# Patient Record
Sex: FEMALE | Race: BLACK OR AFRICAN AMERICAN
[De-identification: names, ages, dates, MRNs, and addresses within clinical notes are randomized per-mention and may not be internally consistent; named-entity substitution may affect disease eponyms.]

---

## 2017-03-17 ENCOUNTER — APPOINTMENT (OUTPATIENT)
Dept: INTERNAL MEDICINE | Facility: CLINIC | Age: 42
End: 2017-03-17

## 2017-04-24 ENCOUNTER — HOSPITAL ENCOUNTER (EMERGENCY)
Facility: HOSPITAL | Age: 42
Discharge: HOME | End: 2017-04-24
Attending: EMERGENCY MEDICINE | Admitting: EMERGENCY MEDICINE

## 2017-04-24 ENCOUNTER — APPOINTMENT (EMERGENCY)
Dept: RADIOLOGY | Facility: HOSPITAL | Age: 42
End: 2017-04-24

## 2017-04-24 VITALS
SYSTOLIC BLOOD PRESSURE: 155 MMHG | TEMPERATURE: 98.1 F | OXYGEN SATURATION: 100 % | RESPIRATION RATE: 18 BRPM | BODY MASS INDEX: 32.65 KG/M2 | HEIGHT: 67 IN | DIASTOLIC BLOOD PRESSURE: 84 MMHG | WEIGHT: 208 LBS | HEART RATE: 91 BPM

## 2017-04-24 DIAGNOSIS — M25.539 WRIST PAIN, ACUTE: Primary | ICD-10-CM

## 2017-04-24 PROCEDURE — 99283 EMERGENCY DEPT VISIT LOW MDM: CPT

## 2017-04-24 PROCEDURE — 73130 X-RAY EXAM OF HAND: CPT

## 2017-04-24 PROCEDURE — 73110 X-RAY EXAM OF WRIST: CPT

## 2017-04-24 RX ORDER — IBUPROFEN 600 MG/1
600 TABLET ORAL EVERY 6 HOURS PRN
Qty: 30 TABLET | Refills: 0 | Status: SHIPPED | OUTPATIENT
Start: 2017-04-24 | End: 2018-04-17

## 2017-04-24 RX ORDER — IBUPROFEN 600 MG/1
600 TABLET ORAL ONCE
Status: COMPLETED | OUTPATIENT
Start: 2017-04-24 | End: 2017-04-24

## 2017-04-24 RX ADMIN — IBUPROFEN 600 MG: 600 TABLET ORAL at 17:10

## 2017-10-11 ENCOUNTER — ALLSCRIPTS OFFICE VISIT (OUTPATIENT)
Dept: OTHER | Facility: OTHER | Age: 42
End: 2017-10-11

## 2017-10-11 DIAGNOSIS — Z13.220 ENCOUNTER FOR SCREENING FOR LIPOID DISORDERS: ICD-10-CM

## 2017-10-11 DIAGNOSIS — Z13.1 ENCOUNTER FOR SCREENING FOR DIABETES MELLITUS: ICD-10-CM

## 2017-10-11 DIAGNOSIS — K62.5 HEMORRHAGE OF ANUS AND RECTUM: ICD-10-CM

## 2017-10-12 NOTE — PROGRESS NOTES
Assessment  1  Constipation (564 00) (K59 00)   2  Irritable bowel syndrome with constipation (564 1) (K58 1)   3  Bright red blood per rectum (569 3) (K62 5)   4  Encounter for preventive health examination (V70 0) (Z00 00)   5  Diabetes mellitus screening (V77 1) (Z13 1)   6  Lipid screening (V77 91) (Z13 220)    Plan  Bright red blood per rectum    · (1) CBC/PLT/DIFF; Status:Active; Requested for:11Oct2017;    · (1) OCCULT BLOOD,FECES(SCREEN); Status:Active; Requested for:11Oct2017;   Bright red blood per rectum, Irritable bowel syndrome with constipation    · Hydrocortisone 2 5 % Rectal Cream; APPLY 1 INCH Bedtime  Diabetes mellitus screening    · (1) COMPREHENSIVE METABOLIC PANEL; Status:Active; Requested for:11Oct2017;   Lipid screening    · (1) LIPID PANEL, FASTING; Status:Active; Requested for:11Oct2017;     Discussion/Summary  Discussion Summary:   IBS- constipation predominant- she was counseled extensively in regards to lifestyle modifications, she was advised to dink water and also to increase fiber in her diet with vegetables and also daily fiber with Benefiber  Also she was  din regards to stress reductions and trying yoga  Given Amitiza a samples and explained side effects to he rin detail red blood per rectum- occult blood ordered if positive consider referred to GI specialist Maintenance- CMP, lipid profile orderedup in 1 month  Medication SE Review and Pt Understands Tx: Possible side effects of new medications were reviewed with the patient/guardian today  The treatment plan was reviewed with the patient/guardian  The patient/guardian understands and agrees with the treatment plan      Chief Complaint  Chief Complaint Free Text Note Form: Patient is here today to become established and have routine check up on medical issues      History of Present Illness  HPI: Patient is here to establish care-sued to have a doctor in [place]  has a history of IBS, constipation predominant   She had a colonoscopy about 2 and a half years ago, normal per patient  She says that she was given MiraLAX which she takes as needed  says that had bright red blood per rectum for 2 days  denies any diarrhea  She says that she feels like she needs to go to the bathroom however she does not feel like she finished completely  She says that she has always been constipated all her adult  life  is not a smoker  also gets migraine headaches  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 [employer]  She works also at [employer]  also has a history of Ovarian cysts  Review of Systems  Complete-Female:   Constitutional: No fever, no chills, feels well, no tiredness, no recent weight gain or weight loss  Eyes: No complaints of eye pain, no red eyes, no eyesight problems, no discharge, no dry eyes, no itching of eyes  Cardiovascular: No complaints of slow heart rate, no fast heart rate, no chest pain, no palpitations, no leg claudication, no lower extremity edema  Respiratory: No complaints of shortness of breath, no wheezing, no cough, no SOB on exertion, no orthopnea, no PND  Gastrointestinal: as noted in HPI  Musculoskeletal: No complaints of arthralgias, no myalgias, no joint swelling or stiffness, no limb pain or swelling  Integumentary: No complaints of skin rash or lesions, no itching, no skin wounds, no breast pain or lump  Neurological: No complaints of headache, no confusion, no convulsions, no numbness, no dizziness or fainting, no tingling, no limb weakness, no difficulty walking  Endocrine: No complaints of proptosis, no hot flashes, no muscle weakness, no deepening of the voice, no feelings of weakness  Hematologic/Lymphatic: No complaints of swollen glands, no swollen glands in the neck, does not bleed easily, does not bruise easily  ROS Reviewed:   ROS reviewed  Active Problems  1  Constipation (564 00) (K59 00)   2   Irritable bowel syndrome with constipation (564 1) (K58 1)    Past Medical History  1  History of No acute medical problems  Active Problems And Past Medical History Reviewed: The active problems and past medical history were reviewed and updated today  Surgical History  1  History of Appendectomy   2  History of  Section    Social History   · Never a smoker    Current Meds   1  No Reported Medications Recorded    Allergies  1  No Known Drug Allergies    Vitals  Vital Signs    Recorded: 74WRX0210 08:23AM   Temperature 98 4 F   Heart Rate 78   Systolic 965   Diastolic 70   Height 5 ft 4 5 in   Weight 217 lb 6 08 oz   BMI Calculated 36 74   BSA Calculated 2 04   O2 Saturation 98     Physical Exam    Constitutional   General appearance: No acute distress, well appearing and well nourished  Eyes   Conjunctiva and lids: No swelling, erythema or discharge  Pulmonary   Respiratory effort: No increased work of breathing or signs of respiratory distress  Auscultation of lungs: Clear to auscultation  Cardiovascular   Auscultation of heart: Normal rate and rhythm, normal S1 and S2, without murmurs  Abdomen   Abdomen: Non-tender, no masses  Musculoskeletal   Gait and station: Normal     Skin   Skin and subcutaneous tissue: Normal without rashes or lesions           Signatures   Electronically signed by : Naty Sena MD; Oct 11 2017  9:06AM EST                       (Author)

## 2017-12-08 ENCOUNTER — LAB REQUISITION (OUTPATIENT)
Dept: LAB | Facility: HOSPITAL | Age: 42
End: 2017-12-08
Payer: COMMERCIAL

## 2017-12-08 ENCOUNTER — ALLSCRIPTS OFFICE VISIT (OUTPATIENT)
Dept: OTHER | Facility: OTHER | Age: 42
End: 2017-12-08

## 2017-12-08 DIAGNOSIS — N85.2 HYPERTROPHY OF UTERUS: ICD-10-CM

## 2017-12-08 DIAGNOSIS — Z01.411 ENCOUNTER FOR GYNECOLOGICAL EXAMINATION WITH ABNORMAL FINDING: ICD-10-CM

## 2017-12-08 DIAGNOSIS — Z11.51 ENCOUNTER FOR SCREENING FOR HUMAN PAPILLOMAVIRUS (HPV): ICD-10-CM

## 2017-12-08 DIAGNOSIS — D64.9 ANEMIA: ICD-10-CM

## 2017-12-08 DIAGNOSIS — Z12.31 ENCOUNTER FOR SCREENING MAMMOGRAM FOR MALIGNANT NEOPLASM OF BREAST: ICD-10-CM

## 2017-12-08 PROCEDURE — 87624 HPV HI-RISK TYP POOLED RSLT: CPT | Performed by: OBSTETRICS & GYNECOLOGY

## 2017-12-08 PROCEDURE — G0145 SCR C/V CYTO,THINLAYER,RESCR: HCPCS | Performed by: OBSTETRICS & GYNECOLOGY

## 2017-12-09 NOTE — PROGRESS NOTES
Assessment    1  Enlarged uterus (621 2) (N85 2)   2  Encounter for gynecological examination with abnormal finding (V72 31) (Z01 411)    Plan  Encounter for gynecological examination with abnormal finding    · Call [phone] if: You find a new or different kind of lump in your breast ;Status:Complete;   Done: [id number]   Ordered;for gynecological examination with abnormal finding; Ordered By:Edson Sequeira Serum;   · Decreasing the stress in your life may help your condition improve ; Status:Complete;  Done: 82YDQ2188   Ordered;For:Encounter for gynecological examination with abnormal finding; Ordered By:Edson Sequeira Serum;   · Eat a normal well-balanced diet ; Status:Complete;   Done: 65XLD2937   Ordered;for gynecological examination with abnormal finding; Ordered By:Edson Sequeira Serum;   · Vitamins can help you get daily requirements that your diet may not be giving you  ;Status:Complete;   Done: 58GOP9787   Ordered;for gynecological examination with abnormal finding; Ordered By:Edson Sequeira Serum;   · We recommend that you follow these steps to lower your risk of osteoporosis  ;Status:Complete;   Done: 32VWV5035   Ordered;for gynecological examination with abnormal finding; Ordered By:Edson Sequeira Serum;   · Follow-up visit in 1 year Evaluation and Treatment  Follow-up  Status: Complete  Done:68Uws4667   Ordered; For: Encounter for gynecological examination with abnormal finding; Ordered By: Dez Katz Performed:  Due: 01ZQC1432; Last Updated By: Samantha Sifuentes; 12/8/2017 3:14:43 PM  Encounter for screening mammogram for malignant neoplasm of breast    · * MAMMO SCREENING BILATERAL W CAD; Status:Active; Requested for:87Smf4730;    Perform:[facility]; [phone];  Last Updated Clearance Phlegm; 12/8/2017 3:09:23 PM;Ordered;for screening mammogram for malignant neoplasm of breast; Ordered By:Edson Sequeira Serum;  Enlarged uterus    · * US PELVIS COMPLETE (TRANSABDOMINAL AND TRANSVAGINAL); Status:Active; Requested for:73Lhj3241;    Perform:[facility]; [phone]; Last Updated By:Maribel Meek; 12/8/2017 3:14:28 PM;Ordered;uterus; Billy Merrill;    Discussion/Summary  healthy adult female HPV and Pap Co-testing Done Today Breast cancer screening: mammogram has been ordered  1  Urinary urgency, vulvar itching and odor may be due to constant pad use - discussed use of petrolatum jelly or emollient on skin for protection  For now try OTC hydrocortisone cream daily x 1 week follow ing menses  Enlarged uterus- will order ultrasound to fully evaluate and compare to prior records  The patient has the current Goals: Healthy lifestyle  The patent has the current Barriers: None  Patient is able to Self-Care  PATIENT EDUCATION RECORD She was given the following educational materials:  age appropriate care guide  Possible side effects of new medications were reviewed with the patient/guardian today  The treatment plan was reviewed with the patient/guardian  The patient/guardian understands and agrees with the treatment plan     Self Referrals: No      Chief Complaint  Pt is here for yearly exam       History of Present Illness  HPI: 1  groin/vulvar itchingurinary urgencyvaginal odorFamily history of breast cancer (Aunt), MGM -abdominal unknown primary    GYN HM, Adult Female [facility] The patient is being seen for a gynecology evaluation  The last health maintenance visit was 1 year(s) ago  Social History: Household members include spouse,-- 3 daughter(s)-- and-- 17, 13, 15  She is   Work status: working full time-- and-- occupation: [employer]  General Health: The patient's health since the last visit is described as good  Lifestyle:  She does not use tobacco -- She consumes alcohol  She reports occasional alcohol use  -- She denies drug use  Reproductive health:  she reports no menstrual problems    Menstrual history: LMP: the last menstrual period was 12/8/2017  Recent menstrual periods: bleeding has been normal  The cycles have been regular  -- she uses contraception  For contraception, she has had a tubal occlusion  -- she is sexually active  -- pregnancy history: G 4P 1,-- 3  Screening: Cervical cancer screening includes a pap smear performed 10/15/2014, negative-- and-- uncertain timing of her last human papilloma virus screening  Review of Systems  vulvar itching,-- feelings of urinary urgency-- and-- urinary loss of control, but-- no pelvic pain,-- no pelvic pressure,-- no vaginal pain,-- no vaginal discharge,-- no vaginal itching,-- no vaginal lump or mass,-- no nonmenstrual bleeding,-- no dysmenorrhea-- and-- no menorrhagia--   The patient presents with complaints of vaginal odor (occurs pre and post menses no vaginal itching, no dysparuenia)  Additional findings include wears pads daily, rare leaking  Groin fold and labia majora itching  Constitutional: No fever, no chills, feels well, no tiredness, no recent weight gain or loss  ENT: no ear ache, no loss of hearing, no nosebleeds or nasal discharge, no sore throat or hoarseness  Cardiovascular: no complaints of slow or fast heart rate, no chest pain, no palpitations, no leg claudication or lower extremity edema  Respiratory: no complaints of shortness of breath, no wheezing, no dyspnea on exertion, no orthopnea or PND  Breasts: no complaints of breast pain, breast lump or nipple discharge  Gastrointestinal: no complaints of abdominal pain, no constipation, no nausea or diarrhea, no vomiting, no bloody stools  Genitourinary: no complaints of dysuria, no incontinence, no pelvic pain, no dysmenorrhea, no vaginal discharge or abnormal vaginal bleeding  Musculoskeletal: no complaints of arthralgia, no myalgia, no joint swelling or stiffness, no limb pain or swelling  Integumentary: no complaints of skin rash or lesion, no itching or dry skin, no skin wounds    Neurological: no complaints of headache, no confusion, no numbness or tingling, no dizziness or fainting  Active Problems    1  Bright red blood per rectum (569 3) (K62 5)   2  Constipation (564 00) (K59 00)   3  Diabetes mellitus screening (V77 1) (Z13 1)   4  Encounter for screening mammogram for malignant neoplasm of breast (V76 12) (Z12 31)   5  Irritable bowel syndrome with constipation (564 1) (K58 1)   6  Lipid screening (V77 91) (Z13 220)   7  Need for influenza vaccination (V04 81) (Z23)    Past Medical History   · History of No acute medical problems    Surgical History   · History of Appendectomy   · History of  Section    Social History     · Never a smoker    Current Meds   1  Amitiza 8 MCG Oral Capsule; take 1 capsule daily; Therapy: [id number] to (Last Rx:2017) Ordered   2  Hydrocortisone 2 5 % Rectal Cream; APPLY 1 INCH Bedtime; Therapy: 39XUV7150 to (Last Rx:2017)  Requested for: 2017 Ordered    Allergies  1  No Known Drug Allergies    Vitals   Recorded: 32KNS8939 91:11VA   Systolic 581, LUE, Sitting   Diastolic 78, LUE, Sitting   Height 5 ft 4 5 in   Weight 214 lb    BMI Calculated 36 17   BSA Calculated 2 02   LMP [id number]       Physical Exam   Constitutional  General appearance: No acute distress, well appearing and well nourished  Genitourinary  External genitalia: Normal and no lesions appreciated  Vagina: Normal, no lesions or dryness appreciated  Urethra: Normal    Urethral meatus: Normal    Bladder: Normal, soft, non-tender and no prolapse or masses appreciated  Cervix: Normal, no palpable masses  Uterus: Abnormal   The uterus was anteverted,-- enlarged 14 weeks-- and-- found to have a 5 cm palpable mass  Adnexa/parametria: Normal, non-tender and no fullness or masses appreciated         Future Appointments    Date/Time Provider Specialty Site   12/10/2018 10:30 AM Elizabeth Rose MD Obstetrics/Gynecology [facility]       Signatures Electronically signed by : Ulices Dasilva MD; Dec  8 2017  3:19PM EST                       (Author)

## 2017-12-13 LAB — HPV RRNA GENITAL QL NAA+PROBE: NORMAL

## 2017-12-15 LAB
LAB AP GYN PRIMARY INTERPRETATION: NORMAL
LAB AP LMP: NORMAL
Lab: NORMAL
PATH INTERP SPEC-IMP: NORMAL

## 2017-12-19 ENCOUNTER — HOSPITAL ENCOUNTER (OUTPATIENT)
Dept: ULTRASOUND IMAGING | Facility: HOSPITAL | Age: 42
Discharge: HOME | End: 2017-12-22
Attending: OBSTETRICS & GYNECOLOGY
Payer: COMMERCIAL

## 2017-12-19 DIAGNOSIS — N85.2 HYPERTROPHY OF UTERUS: ICD-10-CM

## 2017-12-19 PROCEDURE — 76830 TRANSVAGINAL US NON-OB: CPT

## 2017-12-19 PROCEDURE — 76856 US EXAM PELVIC COMPLETE: CPT

## 2017-12-20 ENCOUNTER — APPOINTMENT (OUTPATIENT)
Dept: LAB | Facility: HOSPITAL | Age: 42
End: 2017-12-20
Attending: FAMILY MEDICINE
Payer: COMMERCIAL

## 2017-12-20 DIAGNOSIS — Z13.220 ENCOUNTER FOR SCREENING FOR LIPOID DISORDERS: ICD-10-CM

## 2017-12-20 DIAGNOSIS — Z13.1 ENCOUNTER FOR SCREENING FOR DIABETES MELLITUS: ICD-10-CM

## 2017-12-20 DIAGNOSIS — K62.5 HEMORRHAGE OF ANUS AND RECTUM: ICD-10-CM

## 2017-12-20 LAB
ALBUMIN SERPL BCP-MCNC: 3.4 G/DL (ref 3.5–5)
ALP SERPL-CCNC: 75 U/L (ref 46–116)
ALT SERPL W P-5'-P-CCNC: 15 U/L (ref 12–78)
ANION GAP SERPL CALCULATED.3IONS-SCNC: 7 MMOL/L (ref 4–13)
AST SERPL W P-5'-P-CCNC: 9 U/L (ref 5–45)
BASOPHILS # BLD AUTO: 0.06 THOUSANDS/ΜL (ref 0–0.1)
BASOPHILS NFR BLD AUTO: 1 % (ref 0–1)
BILIRUB SERPL-MCNC: 0.3 MG/DL (ref 0.2–1)
BUN SERPL-MCNC: 12 MG/DL (ref 5–25)
CALCIUM SERPL-MCNC: 9 MG/DL (ref 8.3–10.1)
CHLORIDE SERPL-SCNC: 107 MMOL/L (ref 100–108)
CHOLEST SERPL-MCNC: 152 MG/DL (ref 50–200)
CO2 SERPL-SCNC: 28 MMOL/L (ref 21–32)
CREAT SERPL-MCNC: 0.84 MG/DL (ref 0.6–1.3)
EOSINOPHIL # BLD AUTO: 0.16 THOUSAND/ΜL (ref 0–0.61)
EOSINOPHIL NFR BLD AUTO: 2 % (ref 0–6)
ERYTHROCYTE [DISTWIDTH] IN BLOOD BY AUTOMATED COUNT: 15.2 % (ref 11.6–15.1)
GFR SERPL CREATININE-BSD FRML MDRD: 100 ML/MIN/1.73SQ M
GLUCOSE P FAST SERPL-MCNC: 116 MG/DL (ref 65–99)
HCT VFR BLD AUTO: 34.2 % (ref 34.8–46.1)
HDLC SERPL-MCNC: 60 MG/DL (ref 40–60)
HGB BLD-MCNC: 10.6 G/DL (ref 11.5–15.4)
LDLC SERPL CALC-MCNC: 87 MG/DL (ref 0–100)
LYMPHOCYTES # BLD AUTO: 2.33 THOUSANDS/ΜL (ref 0.6–4.47)
LYMPHOCYTES NFR BLD AUTO: 24 % (ref 14–44)
MCH RBC QN AUTO: 24.3 PG (ref 26.8–34.3)
MCHC RBC AUTO-ENTMCNC: 31 G/DL (ref 31.4–37.4)
MCV RBC AUTO: 78 FL (ref 82–98)
MONOCYTES # BLD AUTO: 0.7 THOUSAND/ΜL (ref 0.17–1.22)
MONOCYTES NFR BLD AUTO: 7 % (ref 4–12)
NEUTROPHILS # BLD AUTO: 6.32 THOUSANDS/ΜL (ref 1.85–7.62)
NEUTS SEG NFR BLD AUTO: 66 % (ref 43–75)
NRBC BLD AUTO-RTO: 0 /100 WBCS
PLATELET # BLD AUTO: 285 THOUSANDS/UL (ref 149–390)
PMV BLD AUTO: 10.7 FL (ref 8.9–12.7)
POTASSIUM SERPL-SCNC: 4.2 MMOL/L (ref 3.5–5.3)
PROT SERPL-MCNC: 7.5 G/DL (ref 6.4–8.2)
RBC # BLD AUTO: 4.36 MILLION/UL (ref 3.81–5.12)
SODIUM SERPL-SCNC: 142 MMOL/L (ref 136–145)
TRIGL SERPL-MCNC: 27 MG/DL
WBC # BLD AUTO: 9.6 THOUSAND/UL (ref 4.31–10.16)

## 2017-12-20 PROCEDURE — 80053 COMPREHEN METABOLIC PANEL: CPT

## 2017-12-20 PROCEDURE — 80061 LIPID PANEL: CPT

## 2017-12-20 PROCEDURE — 36415 COLL VENOUS BLD VENIPUNCTURE: CPT

## 2017-12-20 PROCEDURE — 85025 COMPLETE CBC W/AUTO DIFF WBC: CPT

## 2017-12-22 ENCOUNTER — GENERIC CONVERSION - ENCOUNTER (OUTPATIENT)
Dept: OTHER | Facility: OTHER | Age: 42
End: 2017-12-22

## 2018-01-12 VITALS
WEIGHT: 217.38 LBS | BODY MASS INDEX: 36.22 KG/M2 | SYSTOLIC BLOOD PRESSURE: 110 MMHG | TEMPERATURE: 98.4 F | HEART RATE: 78 BPM | DIASTOLIC BLOOD PRESSURE: 70 MMHG | OXYGEN SATURATION: 98 % | HEIGHT: 65 IN

## 2018-01-22 VITALS
WEIGHT: 214 LBS | SYSTOLIC BLOOD PRESSURE: 130 MMHG | BODY MASS INDEX: 35.65 KG/M2 | HEIGHT: 65 IN | DIASTOLIC BLOOD PRESSURE: 78 MMHG

## 2018-01-23 NOTE — MISCELLANEOUS
Message   Recorded as Task   Date: 12/22/2017 10:39 AM, Created By: Mary Grace   Task Name: Go to Result   Assigned To: Brendan Ibanez   Regarding Patient: Louis Elliott, Status: In Progress   Arturobernardinohal Hess - 22 Dec 2017 10:39 AM     TASK CREATED  please call Shmuel Taylorgeovanna - her ultrasound is normal     Her uterus is normal size  there is a small ovarian cyst which is resolving  Stefani Gil - 22 Dec 2017 10:43 AM     TASK IN PROGRESS   Stefani Gil - 22 Dec 2017 10:46 AM     TASK EDITED  per aaron mckeon for pt re u/s results        Active Problems    1  Anemia (285 9) (D64 9)   2  Bright red blood per rectum (569 3) (K62 5)   3  Constipation (564 00) (K59 00)   4  Diabetes mellitus screening (V77 1) (Z13 1)   5  Encounter for gynecological examination with abnormal finding (V72 31) (Z01 411)   6  Encounter for screening mammogram for malignant neoplasm of breast (V76 12)   (Z12 31)   7  Enlarged uterus (621 2) (N85 2)   8  Irritable bowel syndrome with constipation (564 1) (K58 1)   9  Lipid screening (V77 91) (Z13 220)   10  Need for influenza vaccination (V04 81) (Z23)   11  Screening for HPV (human papillomavirus) (V73 81) (Z11 51)    Current Meds   1  Amitiza 8 MCG Oral Capsule; take 1 capsule daily; Therapy: 28GVU9470 to (Last Rx:11Oct2017) Ordered   2  Hydrocortisone 2 5 % Rectal Cream; APPLY 1 INCH Bedtime; Therapy: [id number] to (Last Rx:11Oct2017)  Requested for: 11Oct2017 Ordered    Allergies    1   No Known Drug Allergies    Signatures   Electronically signed by : Velia Butler, ; Dec 22 2017 10:46AM EST                       (Author)

## 2018-04-11 ENCOUNTER — TELEPHONE (OUTPATIENT)
Dept: FAMILY MEDICINE CLINIC | Facility: CLINIC | Age: 43
End: 2018-04-11

## 2018-04-11 NOTE — TELEPHONE ENCOUNTER
Patient has a drivers permit physical to be filled out   States you did a physical in October and wonders if she needs another physical or can you sign

## 2018-04-17 ENCOUNTER — OFFICE VISIT (OUTPATIENT)
Dept: FAMILY MEDICINE CLINIC | Facility: CLINIC | Age: 43
End: 2018-04-17
Payer: COMMERCIAL

## 2018-04-17 VITALS
HEART RATE: 86 BPM | BODY MASS INDEX: 34.53 KG/M2 | DIASTOLIC BLOOD PRESSURE: 82 MMHG | SYSTOLIC BLOOD PRESSURE: 142 MMHG | HEIGHT: 67 IN | TEMPERATURE: 48.2 F | RESPIRATION RATE: 18 BRPM | WEIGHT: 220 LBS | OXYGEN SATURATION: 98 %

## 2018-04-17 DIAGNOSIS — R73.03 PREDIABETES: ICD-10-CM

## 2018-04-17 DIAGNOSIS — Z12.31 SCREENING MAMMOGRAM, ENCOUNTER FOR: ICD-10-CM

## 2018-04-17 DIAGNOSIS — Z00.00 WELLNESS EXAMINATION: Primary | ICD-10-CM

## 2018-04-17 DIAGNOSIS — R53.83 FATIGUE, UNSPECIFIED TYPE: ICD-10-CM

## 2018-04-17 DIAGNOSIS — Z13.220 LIPID SCREENING: ICD-10-CM

## 2018-04-17 PROCEDURE — 99396 PREV VISIT EST AGE 40-64: CPT | Performed by: NURSE PRACTITIONER

## 2018-04-17 NOTE — PATIENT INSTRUCTIONS
Wellness- cleared for driving  Check labs  Our office will call you with results  Check routine mammogram,     Wellness Visit for Adults   AMBULATORY CARE:   A wellness visit  is when you see your healthcare provider to get screened for health problems  You can also get advice on how to stay healthy  Write down your questions so you remember to ask them  Ask your healthcare provider how often you should have a wellness visit  What happens at a wellness visit:  Your healthcare provider will ask about your health, and your family history of health problems  This includes high blood pressure, heart disease, and cancer  He or she will ask if you have symptoms that concern you, if you smoke, and about your mood  You may also be asked about your intake of medicines, supplements, food, and alcohol  Any of the following may be done:  · Your weight  will be checked  Your height may also be checked so your body mass index (BMI) can be calculated  Your BMI shows if you are at a healthy weight  · Your blood pressure  and heart rate will be checked  Your temperature may also be checked  · Blood and urine tests  may be done  Blood tests may be done to check your cholesterol levels  Abnormal cholesterol levels increase your risk for heart disease and stroke  You may also need a blood or urine test to check for diabetes if you are at increased risk  Urine tests may be done to look for signs of an infection or kidney disease  · A physical exam  includes checking your heartbeat and lungs with a stethoscope  Your healthcare provider may also check your skin to look for sun damage  · Screening tests  may be recommended  A screening test is done to check for diseases that may not cause symptoms  The screening tests you may need depend on your age, gender, family history, and lifestyle habits  For example, colorectal screening may be recommended if you are 48years old or older    Screening tests you need if you are a woman:   · A Pap smear  is used to screen for cervical cancer  Pap smears are usually done every 3 to 5 years depending on your age  You may need them more often if you have had abnormal Pap smear test results in the past  Ask your healthcare provider how often you should have a Pap smear  · A mammogram  is an x-ray of your breasts to screen for breast cancer  Experts recommend mammograms every 2 years starting at age 48 years  You may need a mammogram at age 52 years or younger if you have an increased risk for breast cancer  Talk to your healthcare provider about when you should start having mammograms and how often you need them  Vaccines you may need:   · Get an influenza vaccine  every year  The influenza vaccine protects you from the flu  Several types of viruses cause the flu  The viruses change over time, so new vaccines are made each year  · Get a tetanus-diphtheria (Td) booster vaccine  every 10 years  This vaccine protects you against tetanus and diphtheria  Tetanus is a severe infection that may cause painful muscle spasms and lockjaw  Diphtheria is a severe bacterial infection that causes a thick covering in the back of your mouth and throat  · Get a human papillomavirus (HPV) vaccine  if you are female and aged 23 to 32 or male 23 to 24 and never received it  This vaccine protects you from HPV infection  HPV is the most common infection spread by sexual contact  HPV may also cause vaginal, penile, and anal cancers  · Get a pneumococcal vaccine  if you are aged 72 years or older  The pneumococcal vaccine is an injection given to protect you from pneumococcal disease  Pneumococcal disease is an infection caused by pneumococcal bacteria  The infection may cause pneumonia, meningitis, or an ear infection  · Get a shingles vaccine  if you are aged 61 or older, even if you have had shingles before  The shingles vaccine is an injection to protect you from the varicella-zoster virus   This is the same virus that causes chickenpox  Shingles is a painful rash that develops in people who had chickenpox or have been exposed to the virus  How to eat healthy:  My Plate is a model for planning healthy meals  It shows the types and amounts of foods that should go on your plate  Fruits and vegetables make up about half of your plate, and grains and protein make up the other half  A serving of dairy is included on the side of your plate  The amount of calories and serving sizes you need depends on your age, gender, weight, and height  Examples of healthy foods are listed below:  · Eat a variety of vegetables  such as dark green, red, and orange vegetables  You can also include canned vegetables low in sodium (salt) and frozen vegetables without added butter or sauces  · Eat a variety of fresh fruits , canned fruit in 100% juice, frozen fruit, and dried fruit  · Include whole grains  At least half of the grains you eat should be whole grains  Examples include whole-wheat bread, wheat pasta, brown rice, and whole-grain cereals such as oatmeal     · Eat a variety of protein foods such as seafood (fish and shellfish), lean meat, and poultry without skin (turkey and chicken)  Examples of lean meats include pork leg, shoulder, or tenderloin, and beef round, sirloin, tenderloin, and extra lean ground beef  Other protein foods include eggs and egg substitutes, beans, peas, soy products, nuts, and seeds  · Choose low-fat dairy products such as skim or 1% milk or low-fat yogurt, cheese, and cottage cheese  · Limit unhealthy fats  such as butter, hard margarine, and shortening  Exercise:  Exercise at least 30 minutes per day on most days of the week  Some examples of exercise include walking, biking, dancing, and swimming  You can also fit in more physical activity by taking the stairs instead of the elevator or parking farther away from stores  Include muscle strengthening activities 2 days each week  Regular exercise provides many health benefits  It helps you manage your weight, and decreases your risk for type 2 diabetes, heart disease, stroke, and high blood pressure  Exercise can also help improve your mood  Ask your healthcare provider about the best exercise plan for you  General health and safety guidelines:   · Do not smoke  Nicotine and other chemicals in cigarettes and cigars can cause lung damage  Ask your healthcare provider for information if you currently smoke and need help to quit  E-cigarettes or smokeless tobacco still contain nicotine  Talk to your healthcare provider before you use these products  · Limit alcohol  A drink of alcohol is 12 ounces of beer, 5 ounces of wine, or 1½ ounces of liquor  · Lose weight, if needed  Being overweight increases your risk of certain health conditions  These include heart disease, high blood pressure, type 2 diabetes, and certain types of cancer  · Protect your skin  Do not sunbathe or use tanning beds  Use sunscreen with a SPF 15 or higher  Apply sunscreen at least 15 minutes before you go outside  Reapply sunscreen every 2 hours  Wear protective clothing, hats, and sunglasses when you are outside  · Drive safely  Always wear your seatbelt  Make sure everyone in your car wears a seatbelt  A seatbelt can save your life if you are in an accident  Do not use your cell phone when you are driving  This could distract you and cause an accident  Pull over if you need to make a call or send a text message  · Practice safe sex  Use latex condoms if are sexually active and have more than one partner  Your healthcare provider may recommend screening tests for sexually transmitted infections (STIs)  · Wear helmets, lifejackets, and protective gear  Always wear a helmet when you ride a bike or motorcycle, go skiing, or play sports that could cause a head injury  Wear protective equipment when you play sports   Wear a lifejacket when you are on a boat or doing water sports  © 2017 [facility] Information is for End User's use only and may not be sold, redistributed or otherwise used for commercial purposes  All illustrations and images included in CareNotes® are the copyrighted property of A D A M , Inc  or [employer]  The above information is an  only  It is not intended as medical advice for individual conditions or treatments  Talk to your doctor, nurse or pharmacist before following any medical regimen to see if it is safe and effective for you

## 2018-04-17 NOTE — PROGRESS NOTES
Assessment/Plan:    No problem-specific Assessment & Plan notes found for this encounter  Diagnoses and all orders for this visit:    Wellness examination  -     Comprehensive metabolic panel; Future  -     Lipid panel; Future  -     TSH, 3rd generation; Future  -     Vitamin D 25 hydroxy; Future    Lipid screening  -     Comprehensive metabolic panel; Future  -     Lipid panel; Future  -     TSH, 3rd generation; Future  -     Vitamin D 25 hydroxy; Future    Fatigue, unspecified type  -     Comprehensive metabolic panel; Future  -     Lipid panel; Future  -     TSH, 3rd generation; Future  -     Vitamin D 25 hydroxy; Future    Screening mammogram, encounter for  -     Comprehensive metabolic panel; Future  -     Lipid panel; Future  -     TSH, 3rd generation; Future  -     Vitamin D 25 hydroxy; Future  -     Mammo screening bilateral w cad; Future    Prediabetes  -     Comprehensive metabolic panel; Future  -     Lipid panel; Future  -     TSH, 3rd generation; Future  -     Vitamin D 25 hydroxy; Future  -     HEMOGLOBIN A1C W/ EAG ESTIMATION; Future    Other orders  -     hydrocortisone 2 5 % cream; Insert into the rectum          Subjective:      Patient ID: Tyler Araujo is a 40 y o  female  Pt is here for Annual Physical   She needs Mammogram  She feels well  No issues or concerns  She works at [employer]  The following portions of the patient's history were reviewed and updated as appropriate:   She  has no past medical history on file  She   Patient Active Problem List    Diagnosis Date Noted    Wellness examination 2018    Lipid screening 2018    Fatigue 2018    Screening mammogram, encounter for 2018    Prediabetes 2018     She  has a past surgical history that includes  section and Appendectomy  Her family history is not on file  She  reports that she has never smoked  She has never used smokeless tobacco  She reports that she drinks alcohol   She reports that she does not use drugs  Current Outpatient Prescriptions   Medication Sig Dispense Refill    hydrocortisone 2 5 % cream Insert into the rectum       No current facility-administered medications for this visit  Current Outpatient Prescriptions on File Prior to Visit   Medication Sig    [DISCONTINUED] ibuprofen (MOTRIN) 600 mg tablet Take 1 tablet by mouth every 6 (six) hours as needed for mild pain for up to 10 days     No current facility-administered medications on file prior to visit  She has No Known Allergies       Review of Systems   Constitutional: Negative  HENT: Negative  Eyes: Negative  Respiratory: Negative for cough  Cardiovascular: Negative  Gastrointestinal: Negative  Musculoskeletal: Negative  Skin: Negative  Neurological: Negative  Psychiatric/Behavioral: Negative  All other systems reviewed and are negative  Objective:      /82 (BP Location: Left arm, Patient Position: Sitting)   Pulse 86   Temp (!) 48 2 °F (9 °C)   Resp 18   Ht 5' 7" (1 702 m)   Wt 99 8 kg (220 lb)   SpO2 98%   BMI 34 46 kg/m²          Physical Exam   Constitutional: She is oriented to person, place, and time  She appears well-developed and well-nourished  No distress  HENT:   Head: Normocephalic and atraumatic  Right Ear: External ear normal    Left Ear: External ear normal    Nose: Nose normal    Mouth/Throat: Oropharynx is clear and moist    Eyes: Conjunctivae and EOM are normal  Pupils are equal, round, and reactive to light  Neck: Normal range of motion  Neck supple  No thyromegaly present  Cardiovascular: Normal rate, regular rhythm and normal heart sounds  No murmur heard  Pulmonary/Chest: Effort normal and breath sounds normal  No respiratory distress  She has no wheezes  Abdominal: Soft  Bowel sounds are normal  She exhibits no distension  There is no tenderness  Musculoskeletal: Normal range of motion   She exhibits no edema, tenderness or deformity  Lymphadenopathy:     She has no cervical adenopathy  Neurological: She is alert and oriented to person, place, and time  Skin: Skin is warm and dry  No rash noted  No pallor  Psychiatric: She has a normal mood and affect  Her behavior is normal  Judgment and thought content normal    Nursing note and vitals reviewed

## 2018-09-21 ENCOUNTER — OFFICE VISIT (OUTPATIENT)
Dept: FAMILY MEDICINE CLINIC | Facility: CLINIC | Age: 43
End: 2018-09-21
Payer: COMMERCIAL

## 2018-09-21 VITALS
HEIGHT: 67 IN | BODY MASS INDEX: 32.65 KG/M2 | SYSTOLIC BLOOD PRESSURE: 118 MMHG | RESPIRATION RATE: 18 BRPM | DIASTOLIC BLOOD PRESSURE: 74 MMHG | OXYGEN SATURATION: 97 % | WEIGHT: 208 LBS | HEART RATE: 84 BPM | TEMPERATURE: 98.6 F

## 2018-09-21 DIAGNOSIS — J20.9 ACUTE BRONCHITIS, UNSPECIFIED ORGANISM: Primary | ICD-10-CM

## 2018-09-21 PROCEDURE — 3008F BODY MASS INDEX DOCD: CPT | Performed by: FAMILY MEDICINE

## 2018-09-21 PROCEDURE — 99214 OFFICE O/P EST MOD 30 MIN: CPT | Performed by: FAMILY MEDICINE

## 2018-09-21 PROCEDURE — 1036F TOBACCO NON-USER: CPT | Performed by: FAMILY MEDICINE

## 2018-09-21 RX ORDER — METHYLPREDNISOLONE 4 MG/1
TABLET ORAL
Qty: 21 TABLET | Refills: 0 | Status: SHIPPED | OUTPATIENT
Start: 2018-09-21 | End: 2019-01-21

## 2018-09-21 RX ORDER — AZITHROMYCIN 250 MG/1
TABLET, FILM COATED ORAL
Qty: 6 TABLET | Refills: 0 | Status: SHIPPED | OUTPATIENT
Start: 2018-09-21 | End: 2018-09-25

## 2018-09-21 RX ORDER — FLUTICASONE PROPIONATE 50 MCG
1 SPRAY, SUSPENSION (ML) NASAL DAILY
Qty: 16 G | Refills: 0 | Status: SHIPPED | OUTPATIENT
Start: 2018-09-21 | End: 2018-10-18 | Stop reason: SDUPTHER

## 2018-09-21 NOTE — PROGRESS NOTES
Assessment/Plan:    No problem-specific Assessment & Plan notes found for this encounter  Diagnoses and all orders for this visit:    Acute bronchitis, unspecified organism  After discussing risks and benefits along with side effects of medication patient  Will start:  -     fluticasone (FLONASE) 50 mcg/act nasal spray; 1 spray into each nostril daily  -     azithromycin (ZITHROMAX) 250 mg tablet; Take 2 tablets today then 1 tablet daily x 4 days  -     Methylprednisolone 4 MG TBPK; Use as directed on package      follow-up in 1 week or as needed    Subjective:      Patient ID: Judie Irvin is a 40 y o  female  Acute Bronchitis  Patient presents for evaluation of nasal congestion, productive cough and sore throat  Symptoms began a few days ago and are gradually worsening since that time  Past history is significant for nothing  The following portions of the patient's history were reviewed and updated as appropriate:   She  has no past medical history on file  She   Patient Active Problem List    Diagnosis Date Noted    Acute bronchitis 2018    Wellness examination 2018    Lipid screening 2018    Fatigue 2018    Screening mammogram, encounter for 2018    Prediabetes 2018     She  has a past surgical history that includes  section and Appendectomy  Her family history is not on file  She  reports that she has never smoked  She has never used smokeless tobacco  She reports that she drinks alcohol  She reports that she does not use drugs  Current Outpatient Prescriptions   Medication Sig Dispense Refill    azithromycin (ZITHROMAX) 250 mg tablet Take 2 tablets today then 1 tablet daily x 4 days 6 tablet 0    fluticasone (FLONASE) 50 mcg/act nasal spray 1 spray into each nostril daily 16 g 0    Methylprednisolone 4 MG TBPK Use as directed on package 21 tablet 0     No current facility-administered medications for this visit        Current Outpatient Prescriptions on File Prior to Visit   Medication Sig    [DISCONTINUED] hydrocortisone 2 5 % cream Insert into the rectum     No current facility-administered medications on file prior to visit  She has No Known Allergies       Review of Systems   Constitutional: Negative for activity change, appetite change, fatigue and fever  HENT: Positive for congestion, postnasal drip, rhinorrhea, sinus pain, sinus pressure and sore throat  Negative for ear discharge  Respiratory: Positive for cough, shortness of breath and wheezing  Cardiovascular: Negative for chest pain and palpitations  Gastrointestinal: Negative for diarrhea and nausea  Musculoskeletal: Negative for arthralgias and back pain  Skin: Negative for color change and rash  Neurological: Negative for dizziness and headaches  Psychiatric/Behavioral: Negative for agitation and behavioral problems  Objective:      /74   Pulse 84   Temp 98 6 °F (37 °C) (Tympanic)   Resp 18   Ht 5' 7" (1 702 m)   Wt 94 3 kg (208 lb)   SpO2 97%   BMI 32 58 kg/m²          Physical Exam   Constitutional: She is oriented to person, place, and time  She appears well-developed and well-nourished  No distress  HENT:   Head: Normocephalic and atraumatic  Nose: Nose normal    Mouth/Throat: Oropharynx is clear and moist    Eyes: Conjunctivae are normal  Pupils are equal, round, and reactive to light  Cardiovascular: Normal rate, regular rhythm and normal heart sounds  No murmur heard  Pulmonary/Chest: Effort normal  No respiratory distress  She has wheezes  Abdominal: Soft  Bowel sounds are normal  She exhibits no distension  There is no tenderness  Neurological: She is alert and oriented to person, place, and time  Skin: Skin is warm and dry  No rash noted  She is not diaphoretic  No erythema  Psychiatric: She has a normal mood and affect

## 2018-09-21 NOTE — LETTER
September 21, 2018     Patient: Tere Johnson   YOB: 1975   Date of Visit: 9/21/2018       To Whom it May Concern:    Hilary Rapp is under my professional care  She was seen in my office on 9/21/2018       If you have any questions or concerns, please don't hesitate to call           Sincerely,          Frandy Queen MD        CC: No Recipients

## 2018-10-18 DIAGNOSIS — J20.9 ACUTE BRONCHITIS, UNSPECIFIED ORGANISM: ICD-10-CM

## 2018-10-18 RX ORDER — FLUTICASONE PROPIONATE 50 MCG
1 SPRAY, SUSPENSION (ML) NASAL DAILY
Qty: 16 G | Refills: 5 | Status: SHIPPED | OUTPATIENT
Start: 2018-10-18 | End: 2019-01-21

## 2018-10-18 RX ORDER — FLUTICASONE PROPIONATE 50 MCG
SPRAY, SUSPENSION (ML) NASAL
Refills: 0 | Status: CANCELLED | OUTPATIENT
Start: 2018-10-18

## 2018-12-10 ENCOUNTER — ANNUAL EXAM (OUTPATIENT)
Dept: OBGYN CLINIC | Age: 43
End: 2018-12-10
Payer: COMMERCIAL

## 2018-12-10 VITALS
SYSTOLIC BLOOD PRESSURE: 102 MMHG | DIASTOLIC BLOOD PRESSURE: 68 MMHG | BODY MASS INDEX: 34.06 KG/M2 | HEIGHT: 67 IN | WEIGHT: 217 LBS

## 2018-12-10 DIAGNOSIS — N76.0 BACTERIAL VAGINOSIS: ICD-10-CM

## 2018-12-10 DIAGNOSIS — B96.89 BACTERIAL VAGINOSIS: ICD-10-CM

## 2018-12-10 DIAGNOSIS — Z12.31 ENCOUNTER FOR SCREENING MAMMOGRAM FOR MALIGNANT NEOPLASM OF BREAST: ICD-10-CM

## 2018-12-10 DIAGNOSIS — N76.3 CHRONIC VULVITIS: ICD-10-CM

## 2018-12-10 DIAGNOSIS — Z01.419 ENCOUNTER FOR GYNECOLOGICAL EXAMINATION WITHOUT ABNORMAL FINDING: Primary | ICD-10-CM

## 2018-12-10 PROBLEM — Z00.00 WELLNESS EXAMINATION: Status: RESOLVED | Noted: 2018-04-17 | Resolved: 2018-12-10

## 2018-12-10 PROBLEM — D64.9 ANEMIA: Status: ACTIVE | Noted: 2017-12-22

## 2018-12-10 PROBLEM — J20.9 ACUTE BRONCHITIS: Status: RESOLVED | Noted: 2018-09-21 | Resolved: 2018-12-10

## 2018-12-10 PROBLEM — K59.00 CONSTIPATION: Status: ACTIVE | Noted: 2017-10-03

## 2018-12-10 PROBLEM — K58.1 IRRITABLE BOWEL SYNDROME WITH CONSTIPATION: Status: ACTIVE | Noted: 2017-10-03

## 2018-12-10 PROBLEM — Z13.220 LIPID SCREENING: Status: RESOLVED | Noted: 2018-04-17 | Resolved: 2018-12-10

## 2018-12-10 PROCEDURE — S0612 ANNUAL GYNECOLOGICAL EXAMINA: HCPCS | Performed by: OBSTETRICS & GYNECOLOGY

## 2018-12-10 RX ORDER — METRONIDAZOLE 500 MG/1
500 TABLET ORAL EVERY 12 HOURS SCHEDULED
Qty: 14 TABLET | Refills: 0 | Status: SHIPPED | OUTPATIENT
Start: 2018-12-10 | End: 2018-12-17

## 2018-12-10 RX ORDER — CLOBETASOL PROPIONATE 0.5 MG/G
CREAM TOPICAL DAILY
Qty: 30 G | Refills: 0 | Status: SHIPPED | OUTPATIENT
Start: 2018-12-10 | End: 2020-01-28 | Stop reason: ALTCHOICE

## 2018-12-10 NOTE — ASSESSMENT & PLAN NOTE
Pap/HPV current  Mammogram ordered      Encourage healthy diet, exercise, Calcium 1200mg per day and at least 800 iu Vitamin D daily  Chronic vaginitis/vulvitis s/p menses each month  On exam BV noted, will treat with oral metronidazole  For external vulvitis- clobetasol to be used with onset of next menses once daily x 7 then as needed with menses  Also discussed warm soaks, avoiding skin irritants, avoid perfumes/fragrances/vagisil

## 2018-12-10 NOTE — PATIENT INSTRUCTIONS
Bacterial Vaginosis   WHAT YOU NEED TO KNOW:   What is bacterial vaginosis? Bacterial vaginosis (BV) is an infection in the vagina  It may cause vaginitis, which is irritation and inflammation of the vagina  What causes bacterial vaginosis? The cause of BV is not known  With BV, there is an imbalance in bacteria normally found in the vagina  Your risk for BV increases if you are sexually active  Your risk for BV also increases if you douche or have an intrauterine device (IUD)  What are the signs and symptoms of bacterial vaginosis? Some women have no symptoms  You may have the following:  · White, gray, or yellow vaginal discharge    · Vaginal discharge that smells like fish    · Itching or burning around the outside of your vagina  How is bacterial vaginosis diagnosed? Your healthcare provider will examine you and ask if you have other health conditions  He may need to take a sample of fluid from your vagina  This will be tested for the bacteria that causes BV  How is bacterial vaginosis treated? Antibiotics are given to kill the bacteria that cause BV  They may be given as a pill or as a cream to put in your vagina  Take or use as directed  What are the risks of bacterial vaginosis? If untreated, BV may spread and lead to serious infections in your uterus and fallopian tubes  This can make it more difficult to get pregnant  BV increases your risk for other sexually transmitted infections (STIs), such as chlamydia, gonorrhea, or HIV  How can I prevent bacterial vaginosis? · Keep your vaginal area clean and dry:  Wear underwear and pantyhose with a cotton crotch  Wipe from front to back after you urinate or have a bowel movement  After bathing, rinse soap from your vaginal area to decrease your risk for irritation  · Do not use products that cause irritation:  Always use unscented tampons or sanitary pads   Do not use feminine sprays, powders, or scented tampons because they may cause irritation and increase your risk of BV  Detergents and fabric softeners may also cause irritation  · Do not douche: This can cause an imbalance in healthy vaginal bacteria  · Use latex condoms: This helps prevent another infection and keeps your partner from getting the infection  When should I contact my healthcare provider? · Your symptoms come back or do not improve with treatment  · You have vaginal bleeding that is not your monthly period  · You have questions or concerns about your condition or care  CARE AGREEMENT:   You have the right to help plan your care  Learn about your health condition and how it may be treated  Discuss treatment options with your caregivers to decide what care you want to receive  You always have the right to refuse treatment  The above information is an  only  It is not intended as medical advice for individual conditions or treatments  Talk to your doctor, nurse or pharmacist before following any medical regimen to see if it is safe and effective for you  © 2017 [employer] Information is for End User's use only and may not be sold, redistributed or otherwise used for commercial purposes  All illustrations and images included in CareNotes® are the copyrighted property of [employer]  or [employer]

## 2018-12-10 NOTE — PROGRESS NOTES
Assessment/Plan:    Encounter for gynecological examination without abnormal finding  Pap/HPV current  Mammogram ordered      Encourage healthy diet, exercise, Calcium 1200mg per day and at least 800 iu Vitamin D daily  Chronic vaginitis/vulvitis s/p menses each month  On exam BV noted, will treat with oral metronidazole  For external vulvitis- clobetasol to be used with onset of next menses once daily x 7 then as needed with menses  Also discussed warm soaks, avoiding skin irritants, avoid perfumes/fragrances/vagisil  Diagnoses and all orders for this visit:    Encounter for gynecological examination without abnormal finding    Encounter for screening mammogram for malignant neoplasm of breast  -     Mammo screening bilateral w cad; Future    Bacterial vaginosis  -     metroNIDAZOLE (FLAGYL) 500 mg tablet; Take 1 tablet (500 mg total) by mouth every 12 (twelve) hours for 7 days    Chronic vulvitis  -     clobetasol (TEMOVATE) 0 05 % cream; Apply topically daily for 7 days          Subjective:      Patient ID: Ngozi Chen is a 44 y o  female  Patient here for yearly exam  No current complaints at this time  States priods come every 26 days very heavy lasting up to 5 days  Age of first period: 16years old    Lmp: 18  Birth control: tubal occlusion  Last pap: 17 negative,hpv-(due )  Last mammo:  Patient is not a smoker  Patient is a social drinker  Patient doesn't exercise  Every month at then end of menses foul odor for 2 days  Also severe itching of vulvar skin  Has tried vagisil to no improvement  Sex- uncomfortable with dryness    Gynecologic Exam   The patient's primary symptoms include genital itching and a genital odor  The patient's pertinent negatives include no genital lesions, genital rash, pelvic pain, vaginal bleeding or vaginal discharge  This is a recurrent problem  The current episode started more than 1 year ago  The problem occurs intermittently  The problem has been unchanged  The patient is experiencing no pain  Associated symptoms include painful intercourse  Pertinent negatives include no chills, constipation, diarrhea, fever, frequency, nausea, sore throat, urgency or vomiting  The symptoms are aggravated by tactile pressure and intercourse  She is sexually active  She uses tubal ligation for contraception  Her menstrual history has been regular  The following portions of the patient's history were reviewed and updated as appropriate:   She  has no past medical history on file  She   Patient Active Problem List    Diagnosis Date Noted    Encounter for gynecological examination without abnormal finding 12/10/2018    Fatigue 2018    Prediabetes 2018    Anemia 2017    Constipation 10/03/2017    Irritable bowel syndrome with constipation 10/03/2017     She  has a past surgical history that includes  section and Appendectomy  Her family history includes Breast cancer in her paternal aunt; Cancer in her family  She  reports that she has never smoked  She has never used smokeless tobacco  She reports that she drinks alcohol  She reports that she does not use drugs  Current Outpatient Prescriptions   Medication Sig Dispense Refill    clobetasol (TEMOVATE) 0 05 % cream Apply topically daily for 7 days 30 g 0    fluticasone (FLONASE) 50 mcg/act nasal spray 1 spray into each nostril daily for 30 days 16 g 5    Methylprednisolone 4 MG TBPK Use as directed on package (Patient not taking: Reported on 12/10/2018 ) 21 tablet 0    metroNIDAZOLE (FLAGYL) 500 mg tablet Take 1 tablet (500 mg total) by mouth every 12 (twelve) hours for 7 days 14 tablet 0     No current facility-administered medications for this visit        Current Outpatient Prescriptions on File Prior to Visit   Medication Sig    fluticasone (FLONASE) 50 mcg/act nasal spray 1 spray into each nostril daily for 30 days    Methylprednisolone 4 MG TBPK Use as directed on package (Patient not taking: Reported on 12/10/2018 )     No current facility-administered medications on file prior to visit  She has No Known Allergies       Review of Systems   Constitutional: Negative for activity change, appetite change, chills, fatigue and fever  HENT: Negative for rhinorrhea, sneezing and sore throat  Eyes: Negative for visual disturbance  Respiratory: Negative for cough, shortness of breath and wheezing  Cardiovascular: Negative for chest pain, palpitations and leg swelling  Gastrointestinal: Negative for abdominal distention, constipation, diarrhea, nausea and vomiting  Genitourinary: Negative for difficulty urinating, frequency, pelvic pain, urgency and vaginal discharge  Neurological: Negative for syncope and light-headedness  Objective:      /68 (BP Location: Left arm, Patient Position: Sitting, Cuff Size: Standard)   Ht 5' 7" (1 702 m)   Wt 98 4 kg (217 lb)   LMP 11/30/2018 (Exact Date)   Breastfeeding? No   BMI 33 99 kg/m²          Physical Exam   Constitutional: She is oriented to person, place, and time  Genitourinary: Vagina normal and uterus normal  No breast swelling, tenderness, discharge or bleeding  There is no rash, tenderness, lesion or injury on the right labia  There is no rash, tenderness, lesion or injury on the left labia  Uterus is not deviated, not enlarged, not fixed and not tender  Cervix exhibits no motion tenderness, no discharge and no friability  Right adnexum displays no mass, no tenderness and no fullness  Left adnexum displays no mass, no tenderness and no fullness  No tenderness or bleeding in the vagina  No vaginal discharge found  Genitourinary Comments: Vulva - labia majora are slightly thickened from chronic inflammation, no excoriations, no lesion seen     Vagina- thin gray adherent discharge no erythema   Neurological: She is alert and oriented to person, place, and time

## 2019-01-21 ENCOUNTER — OFFICE VISIT (OUTPATIENT)
Dept: FAMILY MEDICINE CLINIC | Facility: CLINIC | Age: 44
End: 2019-01-21
Payer: COMMERCIAL

## 2019-01-21 VITALS
DIASTOLIC BLOOD PRESSURE: 92 MMHG | RESPIRATION RATE: 18 BRPM | SYSTOLIC BLOOD PRESSURE: 136 MMHG | HEIGHT: 67 IN | BODY MASS INDEX: 33.9 KG/M2 | OXYGEN SATURATION: 98 % | HEART RATE: 108 BPM | TEMPERATURE: 97.5 F | WEIGHT: 216 LBS

## 2019-01-21 DIAGNOSIS — J02.9 SORE THROAT: Primary | ICD-10-CM

## 2019-01-21 LAB — S PYO AG THROAT QL: NEGATIVE

## 2019-01-21 PROCEDURE — 1036F TOBACCO NON-USER: CPT | Performed by: NURSE PRACTITIONER

## 2019-01-21 PROCEDURE — 87880 STREP A ASSAY W/OPTIC: CPT | Performed by: NURSE PRACTITIONER

## 2019-01-21 PROCEDURE — 87070 CULTURE OTHR SPECIMN AEROBIC: CPT | Performed by: NURSE PRACTITIONER

## 2019-01-21 PROCEDURE — 3008F BODY MASS INDEX DOCD: CPT | Performed by: NURSE PRACTITIONER

## 2019-01-21 PROCEDURE — 99214 OFFICE O/P EST MOD 30 MIN: CPT | Performed by: NURSE PRACTITIONER

## 2019-01-21 RX ORDER — AMOXICILLIN AND CLAVULANATE POTASSIUM 875; 125 MG/1; MG/1
TABLET, FILM COATED ORAL
COMMUNITY
Start: 2019-01-13 | End: 2019-01-21

## 2019-01-21 RX ORDER — AZITHROMYCIN 250 MG/1
TABLET, FILM COATED ORAL
Qty: 6 TABLET | Refills: 0 | Status: SHIPPED | OUTPATIENT
Start: 2019-01-21 | End: 2019-01-25

## 2019-01-21 NOTE — PATIENT INSTRUCTIONS
Sore throat- Rapid strep testing in office is negative   Will be sent for culture  Warm salt  gargles  Ibuprofen or advil for pain and fever   Change toothbrush 24 hours after starting antibiotics

## 2019-01-21 NOTE — PROGRESS NOTES
Assessment/Plan:    No problem-specific Assessment & Plan notes found for this encounter  Diagnoses and all orders for this visit:    Sore throat  -     POCT rapid strepA  -     azithromycin (ZITHROMAX) 250 mg tablet; Take 2 tablets today then 1 tablet daily x 4 days    Other orders  -     Discontinue: amoxicillin-clavulanate (AUGMENTIN) 875-125 mg per tablet;           Subjective:      Patient ID: Yoselyn Tse is a 44 y o  female  Patient is here with a sore throat of two weeks duration  She also has cough and congestion and pain in the right side of her neck  She has taken tamiflu recently because her son was treated for Flu and she took his medication  The following portions of the patient's history were reviewed and updated as appropriate:   She  has no past medical history on file  She   Patient Active Problem List    Diagnosis Date Noted    Sore throat 2019    Encounter for gynecological examination without abnormal finding 12/10/2018    Fatigue 2018    Prediabetes 2018    Anemia 2017    Constipation 10/03/2017    Irritable bowel syndrome with constipation 10/03/2017     She  has a past surgical history that includes  section and Appendectomy  Her family history includes Breast cancer in her paternal aunt; Cancer in her family  She  reports that she has never smoked  She has never used smokeless tobacco  She reports that she drinks alcohol  She reports that she does not use drugs  Current Outpatient Prescriptions   Medication Sig Dispense Refill    azithromycin (ZITHROMAX) 250 mg tablet Take 2 tablets today then 1 tablet daily x 4 days 6 tablet 0    clobetasol (TEMOVATE) 0 05 % cream Apply topically daily for 7 days 30 g 0     No current facility-administered medications for this visit        Current Outpatient Prescriptions on File Prior to Visit   Medication Sig    clobetasol (TEMOVATE) 0 05 % cream Apply topically daily for 7 days    [DISCONTINUED] fluticasone (FLONASE) 50 mcg/act nasal spray 1 spray into each nostril daily for 30 days    [DISCONTINUED] Methylprednisolone 4 MG TBPK Use as directed on package (Patient not taking: Reported on 12/10/2018 )     No current facility-administered medications on file prior to visit  She has No Known Allergies       Review of Systems   Constitutional: Negative  Negative for fatigue and fever  HENT: Positive for congestion and sore throat  Eyes: Negative  Negative for visual disturbance  Respiratory: Positive for cough  Negative for chest tightness, shortness of breath and wheezing  Cardiovascular: Negative  Gastrointestinal: Negative  Negative for abdominal pain, blood in stool, diarrhea and nausea  Endocrine: Negative for polydipsia, polyphagia and polyuria  Genitourinary: Negative for difficulty urinating and flank pain  Musculoskeletal: Positive for neck pain  Negative for arthralgias, back pain and myalgias  Skin: Negative  Negative for color change, pallor and rash  Allergic/Immunologic: Negative for immunocompromised state  Neurological: Negative  Negative for dizziness, weakness, light-headedness, numbness and headaches  Hematological: Positive for adenopathy  Psychiatric/Behavioral: Negative  Negative for confusion, decreased concentration and sleep disturbance  All other systems reviewed and are negative  Objective:      /92 (BP Location: Left arm, Patient Position: Sitting)   Pulse (!) 108   Temp 97 5 °F (36 4 °C)   Resp 18   Ht 5' 7" (1 702 m)   Wt 98 kg (216 lb)   SpO2 98%   BMI 33 83 kg/m²          Physical Exam   Constitutional: She is oriented to person, place, and time  She appears well-developed and well-nourished  No distress  HENT:   Head: Normocephalic and atraumatic     Right Ear: External ear normal    Left Ear: External ear normal    Nose: Nose normal    Mouth/Throat: Oropharynx is clear and moist  No oropharyngeal exudate  Eyes: Pupils are equal, round, and reactive to light  Conjunctivae are normal  No scleral icterus  Neck: Normal range of motion  Neck supple  Cardiovascular: Normal rate, regular rhythm and normal heart sounds  Exam reveals no gallop and no friction rub  No murmur heard  Pulmonary/Chest: Effort normal and breath sounds normal  No respiratory distress  She has no wheezes  She has no rales  Abdominal: Soft  Musculoskeletal: Normal range of motion  She exhibits no edema  Lymphadenopathy:     She has no cervical adenopathy  Neurological: She is alert and oriented to person, place, and time  Skin: Skin is warm and dry  No rash noted  She is not diaphoretic  Psychiatric: She has a normal mood and affect  Her behavior is normal  Judgment and thought content normal    Nursing note and vitals reviewed

## 2019-01-24 LAB — BACTERIA THROAT CULT: NORMAL

## 2019-05-14 ENCOUNTER — TELEPHONE (OUTPATIENT)
Dept: OBGYN CLINIC | Age: 44
End: 2019-05-14

## 2019-05-14 DIAGNOSIS — B96.89 BACTERIAL VAGINOSIS: Primary | ICD-10-CM

## 2019-05-14 DIAGNOSIS — N76.0 BACTERIAL VAGINOSIS: Primary | ICD-10-CM

## 2019-05-14 RX ORDER — METRONIDAZOLE 500 MG/1
500 TABLET ORAL EVERY 12 HOURS SCHEDULED
Qty: 14 TABLET | Refills: 0 | Status: SHIPPED | OUTPATIENT
Start: 2019-05-14 | End: 2019-05-21

## 2019-10-23 ENCOUNTER — OFFICE VISIT (OUTPATIENT)
Dept: FAMILY MEDICINE CLINIC | Facility: CLINIC | Age: 44
End: 2019-10-23
Payer: COMMERCIAL

## 2019-10-23 VITALS
BODY MASS INDEX: 33.21 KG/M2 | HEIGHT: 67 IN | HEART RATE: 81 BPM | TEMPERATURE: 98.5 F | SYSTOLIC BLOOD PRESSURE: 120 MMHG | DIASTOLIC BLOOD PRESSURE: 78 MMHG | WEIGHT: 211.6 LBS | OXYGEN SATURATION: 97 %

## 2019-10-23 DIAGNOSIS — Z13.220 LIPID SCREENING: ICD-10-CM

## 2019-10-23 DIAGNOSIS — M54.50 ACUTE LEFT-SIDED LOW BACK PAIN WITHOUT SCIATICA: Primary | ICD-10-CM

## 2019-10-23 DIAGNOSIS — R30.0 DYSURIA: ICD-10-CM

## 2019-10-23 DIAGNOSIS — Z12.31 SCREENING MAMMOGRAM, ENCOUNTER FOR: ICD-10-CM

## 2019-10-23 DIAGNOSIS — J35.03 TONSILLITIS AND ADENOIDITIS, CHRONIC: ICD-10-CM

## 2019-10-23 DIAGNOSIS — R73.03 PREDIABETES: ICD-10-CM

## 2019-10-23 LAB
SL AMB  POCT GLUCOSE, UA: NEGATIVE
SL AMB LEUKOCYTE ESTERASE,UA: NEGATIVE
SL AMB POCT BILIRUBIN,UA: NEGATIVE
SL AMB POCT BLOOD,UA: NEGATIVE
SL AMB POCT CLARITY,UA: CLEAR
SL AMB POCT COLOR,UA: YELLOW
SL AMB POCT KETONES,UA: NEGATIVE
SL AMB POCT NITRITE,UA: NEGATIVE
SL AMB POCT PH,UA: 7
SL AMB POCT SPECIFIC GRAVITY,UA: 1.02
SL AMB POCT URINE PROTEIN: NEGATIVE
SL AMB POCT UROBILINOGEN: 1

## 2019-10-23 PROCEDURE — 99213 OFFICE O/P EST LOW 20 MIN: CPT | Performed by: FAMILY MEDICINE

## 2019-10-23 PROCEDURE — 3008F BODY MASS INDEX DOCD: CPT | Performed by: FAMILY MEDICINE

## 2019-10-23 PROCEDURE — 81002 URINALYSIS NONAUTO W/O SCOPE: CPT | Performed by: FAMILY MEDICINE

## 2019-10-23 RX ORDER — FLUTICASONE PROPIONATE 50 MCG
1 SPRAY, SUSPENSION (ML) NASAL DAILY
Qty: 1 BOTTLE | Refills: 1 | Status: SHIPPED | OUTPATIENT
Start: 2019-10-23 | End: 2019-11-11 | Stop reason: ALTCHOICE

## 2019-10-23 NOTE — PROGRESS NOTES
Assessment/Plan:    No problem-specific Assessment & Plan notes found for this encounter  Diagnoses and all orders for this visit:    Acute left-sided low back pain without sciatica  She was advised to take ibuprofen OTC  Also lidocaine patch 4 %  And PT  -     Ambulatory referral to Physical Therapy; Future    Lipid screening  -     Lipid panel; Future    Prediabetes  -     Comprehensive metabolic panel; Future  -     Hemoglobin A1C; Future    Screening mammogram, encounter for  -     Mammo screening bilateral w cad; Future    Tonsillitis and adenoiditis, chronic  -     Ambulatory Referral to Otolaryngology; Future  -     loratadine-pseudoephedrine (CLARITIN-D 12-HOUR) 5-120 mg per tablet; Take 1 tablet by mouth 2 (two) times a day for 5 days  -     fluticasone (FLONASE) 50 mcg/act nasal spray; 1 spray into each nostril daily    Dysuria  -     POCT urine dip, normal    Follow up in 1 month or as needed          Subjective:      Patient ID: Lucila Ruelas is a 44 y o  female  Patient is here because she has a left-sided ow back pain  She works at  [employer] and has to lift patients at times  She denies any recent injuries to her back  She had decreased urine output a few days ago which she attributed to not drinking much water that day  The pain is described as dull in nature nonradiating  Also she is complaining tonsillar enlargement and tenderness more on the left side of her tonsils  She relates a history of chronic tonsil infections in the past   Also she has been spitting some granules from her mouth out of her throat that are foul smelling per patient  She has a family history of cancer  Gynecological and also a maternal aunt who has a history of breast cancer  She was given a script for mammography last year sharp however she did not do it yet        The following portions of the patient's history were reviewed and updated as appropriate: She  has no past medical history on file   She   Patient Active Problem List    Diagnosis Date Noted    Acute left-sided low back pain without sciatica 10/23/2019    Screening mammogram, encounter for 10/23/2019    Tonsillitis and adenoiditis, chronic 10/23/2019    Dysuria 10/23/2019    Sore throat 2019    Encounter for gynecological examination without abnormal finding 12/10/2018    Lipid screening 2018    Fatigue 2018    Prediabetes 2018    Anemia 2017    Constipation 10/03/2017    Irritable bowel syndrome with constipation 10/03/2017     She  has a past surgical history that includes  section and Appendectomy  Her family history includes Breast cancer in her paternal aunt; Cancer in her family  She  reports that she has never smoked  She has never used smokeless tobacco  She reports that she drinks alcohol  She reports that she does not use drugs  Current Outpatient Medications   Medication Sig Dispense Refill    clobetasol (TEMOVATE) 0 05 % cream Apply topically daily for 7 days (Patient not taking: Reported on 10/23/2019) 30 g 0    fluticasone (FLONASE) 50 mcg/act nasal spray 1 spray into each nostril daily 1 Bottle 1    loratadine-pseudoephedrine (CLARITIN-D 12-HOUR) 5-120 mg per tablet Take 1 tablet by mouth 2 (two) times a day for 5 days 10 tablet 0     No current facility-administered medications for this visit  Current Outpatient Medications on File Prior to Visit   Medication Sig    clobetasol (TEMOVATE) 0 05 % cream Apply topically daily for 7 days (Patient not taking: Reported on 10/23/2019)     No current facility-administered medications on file prior to visit  She has No Known Allergies       Review of Systems   Constitutional: Negative for activity change, appetite change, fatigue and fever  HENT: Positive for congestion and sore throat  Negative for ear discharge  Respiratory: Negative for cough and shortness of breath      Cardiovascular: Negative for chest pain and palpitations  Gastrointestinal: Negative for diarrhea and nausea  Musculoskeletal: Positive for back pain and myalgias  Negative for arthralgias  Skin: Negative for color change and rash  Neurological: Negative for dizziness and headaches  Psychiatric/Behavioral: Negative for agitation and behavioral problems  Objective:      /78   Pulse 81   Temp 98 5 °F (36 9 °C)   Ht 5' 7" (1 702 m)   Wt 96 kg (211 lb 9 6 oz)   SpO2 97%   BMI 33 14 kg/m²          Physical Exam   Constitutional: She is oriented to person, place, and time  She appears well-developed and well-nourished  No distress  HENT:   Head: Normocephalic and atraumatic  Right Ear: External ear normal    Left Ear: External ear normal    Nose: Nose normal    Mouth/Throat: Oropharynx is clear and moist  No oropharyngeal exudate  Left tonsillar enlargement no exudates   Eyes: Pupils are equal, round, and reactive to light  Conjunctivae are normal    Cardiovascular: Normal rate, regular rhythm and normal heart sounds  No murmur heard  Pulmonary/Chest: Effort normal and breath sounds normal  No respiratory distress  She has no wheezes  Abdominal: Soft  Bowel sounds are normal  She exhibits no distension  There is no tenderness  Musculoskeletal: Normal range of motion  She exhibits tenderness  She exhibits no edema or deformity  Neurological: She is alert and oriented to person, place, and time  Skin: Skin is warm and dry  No rash noted  She is not diaphoretic  No erythema  Psychiatric: She has a normal mood and affect

## 2019-10-24 NOTE — H&P (VIEW-ONLY)
Assessment/Plan:    Based upon the history given and current physical findings, I have recommended that the patient undergo an tonsillectomy  All risks, benefits, alternatives, and complications of the procedure have been reviewed in detail  The risks of the surgery include but are not limited to: bleeding, infection, voice change, recurrent sore throat, swallowing difficulty, and the need for further surgery  The patient / parent understands and accept all risks of the surgery  Pre-operative lab tests have been ordered  Post operative instructions were reivewed and given to the patient / parent  Post operative medication was given and the patient / parent was  instructed to fill the medication in preparation for the surgery  A  post-operative appointment has been made for the patient  If any questions should arise prior to or after the surgery, the patient / parent should call my office and I will be glad to discuss any questions or concerns with them  The nasal congestion is secondary to inferior turbinate hypertrophy  She has failed therapy with topical nasal steroid spray  Based upon the history given and the current physical findings, I have recommended that the patient undergo partial inferior turbinate reduction and out fracture  All risks, benefits, alternatives, and complications have been reviewed in detail  The risks of the surgery include but are not limited to: bleeding, infection, need for further surgery or repeat procedure, continued hypertrophy, intranasal synechiae formation, polyp formation, and nasal congestion  The patient / parent understands and accepts all risks of the surgery  Pre-operative labs have been ordered  Medical clearance is not necessary  Post-operative medications have been prescribed and the patient / parent has been instructed to fill the medication in preparation for the surgery    Post operative instructions have been reviewed and a copy has been provided to the patient / parent  A post operative appointment has been made for the patient  If any questions should arise prior to or after the surgery, the patient should call my office and I will be glad to discuss any questions or concerns with them  Encounter Diagnosis     ICD-10-CM    1  Nasal congestion R09 81    2  Tonsillitis and adenoiditis, chronic J35 03 Ambulatory Referral to Otolaryngology     oxyCODONE-acetaminophen (PERCOCET) 5-325 mg per tablet     predniSONE 20 mg tablet   3  Hypertrophy of both inferior nasal turbinates J34 3    4  Other chronic diseases of tonsils and adenoids J35 8               Patient ID: Serina Capellan is a 44 y o  female  Sonal Duque is here for evaluation of chronic tonsillith formation and chronic tonsillitis  She has had issues since she was very young - initially it was recurrent strep throat when she was a young child  She has most of her issues on the left side  The tonsilliths are usually on the left side  There is no pain today  However, the pain radiates to the left ear when it is present  She also has issues with decrease sense of smell  This is associated with nasal congestion  She has tried using Flonase without any relief - she used this medication for a few weeks without any relief - she was then given another spray (unknown name - OTC) and this helped more  She states that this works immediately and therefore it is likely a nasal decongestant  The following portions of the patient's history were reviewed and updated as appropriate: allergies, current medications, past family history, past medical history, past social history, past surgical history and problem list       No past medical history on file  Past Surgical History:   Procedure Laterality Date    APPENDECTOMY       SECTION         Social History     Tobacco Use    Smoking status: Never Smoker    Smokeless tobacco: Never Used   Substance Use Topics    Alcohol use:  Yes Comment: rare    Drug use: No       Current Outpatient Medications on File Prior to Visit   Medication Sig Dispense Refill    clobetasol (TEMOVATE) 0 05 % cream Apply topically daily for 7 days (Patient not taking: Reported on 10/23/2019) 30 g 0    fluticasone (FLONASE) 50 mcg/act nasal spray 1 spray into each nostril daily (Patient not taking: Reported on 10/28/2019) 1 Bottle 1    loratadine-pseudoephedrine (CLARITIN-D 12-HOUR) 5-120 mg per tablet Take 1 tablet by mouth 2 (two) times a day for 5 days (Patient not taking: Reported on 10/28/2019) 10 tablet 0     No current facility-administered medications on file prior to visit  No Known Allergies        Review of Systems   Constitutional: Negative for activity change, appetite change, fatigue, fever and unexpected weight change  HENT: Positive for congestion  Negative for ear discharge, ear pain, hearing loss, nosebleeds, postnasal drip, rhinorrhea, sinus pressure, sinus pain, sneezing, sore throat, tinnitus, trouble swallowing and voice change  Eyes: Negative  Negative for photophobia, pain, itching and visual disturbance  Respiratory: Negative  Negative for cough, chest tightness and shortness of breath  Cardiovascular: Negative  Negative for chest pain  Gastrointestinal: Negative  Negative for abdominal pain  Endocrine: Negative  Musculoskeletal: Negative  Negative for gait problem, neck pain and neck stiffness  Skin: Negative  Allergic/Immunologic: Negative  Negative for environmental allergies  Neurological: Negative  Negative for dizziness, speech difficulty, light-headedness and headaches  Hematological: Negative  Negative for adenopathy  Psychiatric/Behavioral: Negative  Negative for sleep disturbance  The patient is not nervous/anxious  /80   Pulse 72   Ht 5' 7" (1 702 m)   Wt 95 7 kg (211 lb)   BMI 33 05 kg/m²       PHYSICAL  EXAMINATION    CONSTITUTION:    Appears appropriate for age  No evidence of any acute distress  Communicates normally  Voice quality is clear  Alert and oriented  HEAD/FACE:    Atraumatic, normocephalic on inspection  No scars present  Salivary glands are normal in texture and size without any asymmetry  Facial nerve function is symmetric and normal     EYES:    Extraocular muscles intact in both eyes, normal gaze bilaterally and no evidence of nystagmus  Pupils equal, round, and accommodate to light bilaterally  EARS:    External ears normal     External canals are clear and dry  Tympanic membranes intact with normal mobility, no effusion, no retraction, no perforation  Post auricular area is normal    NOSE:    External nose without deformity  Internal mucosa pink and moist     Septum midline  Inferior nasal turbinates normal in color - both sides hypertrophic    ORAL CAVITY:    Lips normal and healthy in appearance  Dentition normal     Gums healthy, pink and moist     Tongue appears pink and moist with no lesions  Floor of mouth pink, moist, and smooth  Submandibular ducts patent with clear saliva  Parotid ducts patent with clear saliva  Oral mucosa pink and moist     Hard palate normal in appearance without any lesions  OROPHARYNX:    Soft palate pink and moist without any lesions  Uvula midline without any lesions  Tonsil grade 2 on the right and grade 3 on the left side  Posterior pharynx pink and moist without any lesions    NECK:    Supple and symmetric  No masses noted  Trachea midline  No thyromegaly or nodules noted  LYMPH:    No palpable adenopathy in left or right neck    SKIN:    No rashes  No lesions noted       HEART:   Regular rate and rhythm    LUNGS:  Clear to auscultation bilaterally    Nasal Endoscopy (non-operated)     Because only the anterior portion of the turbinates and anterior nasal septum could be visualized, it was elected to proceed with nasal endoscopy for visualization of the posterior nasal chamber, middle meatal area, sphenoid recess and nasopharynx  Verbal consent was obtained from the patient / parent  The nasal cavity was sprayed with a solution of Phenylephrine:Lidocaine (1:1)  After waiting an appropriate amount of time for the medication to take effect the procedure was completed with the flexible endoscope  Findings are as follows:    Floor of Nose:  smooth mucosa without any mass or obstruction    Septum: very slight spur formation right sided    Inferior turbinates:  Normal in size, pink, moist, no abnormalities    Middle turbinates:  Normal in size, pink, moist, no abnormalities    Superior turbinates:  Normal in size, pink, moist, no abnormalities    Middle meatus:  Normal mucosa without any pus, polyps  Sphenoid os: Normal mucosa without mucopus or polyps    Nasopharynx:  No masses  Smooth pink mucosa    Eustachian tube orifice:  Pink mucosa, patent, no obstruction

## 2019-10-28 PROBLEM — J34.3 HYPERTROPHY OF BOTH INFERIOR NASAL TURBINATES: Status: ACTIVE | Noted: 2019-10-28

## 2019-10-28 PROBLEM — R09.81 NASAL CONGESTION: Status: ACTIVE | Noted: 2019-10-28

## 2019-10-28 PROBLEM — J35.8 OTHER CHRONIC DISEASES OF TONSILS AND ADENOIDS: Status: ACTIVE | Noted: 2019-10-28

## 2019-11-05 ENCOUNTER — APPOINTMENT (OUTPATIENT)
Dept: LAB | Facility: HOSPITAL | Age: 44
End: 2019-11-05
Attending: FAMILY MEDICINE
Payer: COMMERCIAL

## 2019-11-05 DIAGNOSIS — R73.03 PREDIABETES: ICD-10-CM

## 2019-11-05 DIAGNOSIS — Z13.220 LIPID SCREENING: ICD-10-CM

## 2019-11-05 DIAGNOSIS — R09.81 NASAL CONGESTION: ICD-10-CM

## 2019-11-05 DIAGNOSIS — J35.8 OTHER CHRONIC DISEASES OF TONSILS AND ADENOIDS: ICD-10-CM

## 2019-11-05 DIAGNOSIS — J34.3 HYPERTROPHY OF BOTH INFERIOR NASAL TURBINATES: ICD-10-CM

## 2019-11-05 DIAGNOSIS — J35.03 TONSILLITIS AND ADENOIDITIS, CHRONIC: ICD-10-CM

## 2019-11-05 LAB
ALBUMIN SERPL BCP-MCNC: 3.3 G/DL (ref 3.5–5)
ALP SERPL-CCNC: 74 U/L (ref 46–116)
ALT SERPL W P-5'-P-CCNC: 12 U/L (ref 12–78)
ANION GAP SERPL CALCULATED.3IONS-SCNC: 9 MMOL/L (ref 4–13)
APTT PPP: 29 SECONDS (ref 23–37)
AST SERPL W P-5'-P-CCNC: 10 U/L (ref 5–45)
BASOPHILS # BLD AUTO: 0.04 THOUSANDS/ΜL (ref 0–0.1)
BASOPHILS NFR BLD AUTO: 1 % (ref 0–1)
BILIRUB SERPL-MCNC: 0.4 MG/DL (ref 0.2–1)
BUN SERPL-MCNC: 13 MG/DL (ref 5–25)
CALCIUM SERPL-MCNC: 8.8 MG/DL (ref 8.3–10.1)
CHLORIDE SERPL-SCNC: 107 MMOL/L (ref 100–108)
CHOLEST SERPL-MCNC: 170 MG/DL (ref 50–200)
CO2 SERPL-SCNC: 26 MMOL/L (ref 21–32)
CREAT SERPL-MCNC: 0.88 MG/DL (ref 0.6–1.3)
EOSINOPHIL # BLD AUTO: 0.15 THOUSAND/ΜL (ref 0–0.61)
EOSINOPHIL NFR BLD AUTO: 2 % (ref 0–6)
ERYTHROCYTE [DISTWIDTH] IN BLOOD BY AUTOMATED COUNT: 16.9 % (ref 11.6–15.1)
EST. AVERAGE GLUCOSE BLD GHB EST-MCNC: 120 MG/DL
GFR SERPL CREATININE-BSD FRML MDRD: 94 ML/MIN/1.73SQ M
GLUCOSE P FAST SERPL-MCNC: 94 MG/DL (ref 65–99)
HBA1C MFR BLD: 5.8 % (ref 4.2–6.3)
HCT VFR BLD AUTO: 31.2 % (ref 34.8–46.1)
HDLC SERPL-MCNC: 53 MG/DL
HGB BLD-MCNC: 9.6 G/DL (ref 11.5–15.4)
IMM GRANULOCYTES # BLD AUTO: 0.03 THOUSAND/UL (ref 0–0.2)
IMM GRANULOCYTES NFR BLD AUTO: 0 % (ref 0–2)
INR PPP: 1.08 (ref 0.84–1.19)
LDLC SERPL CALC-MCNC: 112 MG/DL (ref 0–100)
LYMPHOCYTES # BLD AUTO: 2.18 THOUSANDS/ΜL (ref 0.6–4.47)
LYMPHOCYTES NFR BLD AUTO: 25 % (ref 14–44)
MCH RBC QN AUTO: 23.3 PG (ref 26.8–34.3)
MCHC RBC AUTO-ENTMCNC: 30.8 G/DL (ref 31.4–37.4)
MCV RBC AUTO: 76 FL (ref 82–98)
MONOCYTES # BLD AUTO: 0.64 THOUSAND/ΜL (ref 0.17–1.22)
MONOCYTES NFR BLD AUTO: 7 % (ref 4–12)
NEUTROPHILS # BLD AUTO: 5.66 THOUSANDS/ΜL (ref 1.85–7.62)
NEUTS SEG NFR BLD AUTO: 65 % (ref 43–75)
NONHDLC SERPL-MCNC: 117 MG/DL
NRBC BLD AUTO-RTO: 0 /100 WBCS
PLATELET # BLD AUTO: 287 THOUSANDS/UL (ref 149–390)
PMV BLD AUTO: 11.5 FL (ref 8.9–12.7)
POTASSIUM SERPL-SCNC: 3.7 MMOL/L (ref 3.5–5.3)
PROT SERPL-MCNC: 7.3 G/DL (ref 6.4–8.2)
PROTHROMBIN TIME: 14.1 SECONDS (ref 11.6–14.5)
RBC # BLD AUTO: 4.12 MILLION/UL (ref 3.81–5.12)
SODIUM SERPL-SCNC: 142 MMOL/L (ref 136–145)
TRIGL SERPL-MCNC: 24 MG/DL
WBC # BLD AUTO: 8.7 THOUSAND/UL (ref 4.31–10.16)

## 2019-11-05 PROCEDURE — 80061 LIPID PANEL: CPT

## 2019-11-05 PROCEDURE — 85730 THROMBOPLASTIN TIME PARTIAL: CPT

## 2019-11-05 PROCEDURE — 83036 HEMOGLOBIN GLYCOSYLATED A1C: CPT

## 2019-11-05 PROCEDURE — 85025 COMPLETE CBC W/AUTO DIFF WBC: CPT

## 2019-11-05 PROCEDURE — 85610 PROTHROMBIN TIME: CPT

## 2019-11-05 PROCEDURE — 80053 COMPREHEN METABOLIC PANEL: CPT

## 2019-11-05 PROCEDURE — 36415 COLL VENOUS BLD VENIPUNCTURE: CPT

## 2019-11-11 NOTE — PRE-PROCEDURE INSTRUCTIONS
Pre-Surgery Instructions:   Medication Instructions    clobetasol (TEMOVATE) 0 05 % cream Patient was instructed by Physician and understands  [facility] preop instructions reviewed with pt  Pt will get dial antibacterial soap

## 2019-11-18 ENCOUNTER — ANESTHESIA EVENT (OUTPATIENT)
Dept: PERIOP | Facility: HOSPITAL | Age: 44
End: 2019-11-18
Payer: COMMERCIAL

## 2019-11-19 ENCOUNTER — HOSPITAL ENCOUNTER (OUTPATIENT)
Facility: HOSPITAL | Age: 44
Setting detail: OUTPATIENT SURGERY
Discharge: HOME | End: 2019-11-19
Attending: OTOLARYNGOLOGY | Admitting: OTOLARYNGOLOGY
Payer: COMMERCIAL

## 2019-11-19 ENCOUNTER — ANESTHESIA (OUTPATIENT)
Dept: PERIOP | Facility: HOSPITAL | Age: 44
End: 2019-11-19
Payer: COMMERCIAL

## 2019-11-19 VITALS
SYSTOLIC BLOOD PRESSURE: 126 MMHG | WEIGHT: 215 LBS | HEART RATE: 80 BPM | OXYGEN SATURATION: 98 % | BODY MASS INDEX: 35.82 KG/M2 | DIASTOLIC BLOOD PRESSURE: 68 MMHG | HEIGHT: 65 IN | TEMPERATURE: 97.3 F | RESPIRATION RATE: 16 BRPM

## 2019-11-19 DIAGNOSIS — J35.03 TONSILLITIS AND ADENOIDITIS, CHRONIC: ICD-10-CM

## 2019-11-19 DIAGNOSIS — J35.8 OTHER CHRONIC DISEASES OF TONSILS AND ADENOIDS: ICD-10-CM

## 2019-11-19 DIAGNOSIS — J34.3 HYPERTROPHY OF BOTH INFERIOR NASAL TURBINATES: ICD-10-CM

## 2019-11-19 DIAGNOSIS — R09.81 NASAL CONGESTION: ICD-10-CM

## 2019-11-19 LAB
EXT PREGNANCY TEST URINE: NEGATIVE
EXT. CONTROL: NORMAL

## 2019-11-19 PROCEDURE — 42826 REMOVAL OF TONSILS: CPT | Performed by: OTOLARYNGOLOGY

## 2019-11-19 PROCEDURE — 88304 TISSUE EXAM BY PATHOLOGIST: CPT | Performed by: PATHOLOGY

## 2019-11-19 PROCEDURE — 30802 ABLATE INF TURBINATE SUBMUC: CPT | Performed by: OTOLARYNGOLOGY

## 2019-11-19 PROCEDURE — 81025 URINE PREGNANCY TEST: CPT | Performed by: ANESTHESIOLOGY

## 2019-11-19 PROCEDURE — 30930 THER FX NASAL INF TURBINATE: CPT | Performed by: OTOLARYNGOLOGY

## 2019-11-19 RX ORDER — OXYCODONE HYDROCHLORIDE AND ACETAMINOPHEN 5; 325 MG/1; MG/1
2 TABLET ORAL EVERY 4 HOURS PRN
Status: DISCONTINUED | OUTPATIENT
Start: 2019-11-19 | End: 2019-11-19 | Stop reason: HOSPADM

## 2019-11-19 RX ORDER — ROCURONIUM BROMIDE 10 MG/ML
INJECTION, SOLUTION INTRAVENOUS AS NEEDED
Status: DISCONTINUED | OUTPATIENT
Start: 2019-11-19 | End: 2019-11-19 | Stop reason: SURG

## 2019-11-19 RX ORDER — FENTANYL CITRATE 50 UG/ML
INJECTION, SOLUTION INTRAMUSCULAR; INTRAVENOUS AS NEEDED
Status: DISCONTINUED | OUTPATIENT
Start: 2019-11-19 | End: 2019-11-19 | Stop reason: SURG

## 2019-11-19 RX ORDER — ACETAMINOPHEN 325 MG/1
650 TABLET ORAL EVERY 4 HOURS PRN
Status: DISCONTINUED | OUTPATIENT
Start: 2019-11-19 | End: 2019-11-19 | Stop reason: HOSPADM

## 2019-11-19 RX ORDER — LIDOCAINE HYDROCHLORIDE AND EPINEPHRINE 10; 10 MG/ML; UG/ML
INJECTION, SOLUTION INFILTRATION; PERINEURAL AS NEEDED
Status: DISCONTINUED | OUTPATIENT
Start: 2019-11-19 | End: 2019-11-19 | Stop reason: HOSPADM

## 2019-11-19 RX ORDER — SODIUM CHLORIDE/ALOE VERA
GEL (GRAM) NASAL AS NEEDED
Status: DISCONTINUED | OUTPATIENT
Start: 2019-11-19 | End: 2019-11-19 | Stop reason: HOSPADM

## 2019-11-19 RX ORDER — DEXTROSE MONOHYDRATE AND SODIUM CHLORIDE 5; .45 G/100ML; G/100ML
125 INJECTION, SOLUTION INTRAVENOUS CONTINUOUS
Status: DISCONTINUED | OUTPATIENT
Start: 2019-11-19 | End: 2019-11-19 | Stop reason: HOSPADM

## 2019-11-19 RX ORDER — PROPOFOL 10 MG/ML
INJECTION, EMULSION INTRAVENOUS AS NEEDED
Status: DISCONTINUED | OUTPATIENT
Start: 2019-11-19 | End: 2019-11-19 | Stop reason: SURG

## 2019-11-19 RX ORDER — MAGNESIUM HYDROXIDE 1200 MG/15ML
LIQUID ORAL AS NEEDED
Status: DISCONTINUED | OUTPATIENT
Start: 2019-11-19 | End: 2019-11-19 | Stop reason: HOSPADM

## 2019-11-19 RX ORDER — ONDANSETRON 2 MG/ML
4 INJECTION INTRAMUSCULAR; INTRAVENOUS ONCE AS NEEDED
Status: DISCONTINUED | OUTPATIENT
Start: 2019-11-19 | End: 2019-11-19 | Stop reason: HOSPADM

## 2019-11-19 RX ORDER — ONDANSETRON 2 MG/ML
4 INJECTION INTRAMUSCULAR; INTRAVENOUS EVERY 6 HOURS PRN
Status: DISCONTINUED | OUTPATIENT
Start: 2019-11-19 | End: 2019-11-19 | Stop reason: HOSPADM

## 2019-11-19 RX ORDER — DEXAMETHASONE SODIUM PHOSPHATE 4 MG/ML
INJECTION, SOLUTION INTRA-ARTICULAR; INTRALESIONAL; INTRAMUSCULAR; INTRAVENOUS; SOFT TISSUE AS NEEDED
Status: DISCONTINUED | OUTPATIENT
Start: 2019-11-19 | End: 2019-11-19 | Stop reason: SURG

## 2019-11-19 RX ORDER — ONDANSETRON 2 MG/ML
INJECTION INTRAMUSCULAR; INTRAVENOUS AS NEEDED
Status: DISCONTINUED | OUTPATIENT
Start: 2019-11-19 | End: 2019-11-19 | Stop reason: SURG

## 2019-11-19 RX ORDER — SCOLOPAMINE TRANSDERMAL SYSTEM 1 MG/1
1 PATCH, EXTENDED RELEASE TRANSDERMAL
Status: DISCONTINUED | OUTPATIENT
Start: 2019-11-19 | End: 2019-11-19 | Stop reason: HOSPADM

## 2019-11-19 RX ORDER — SODIUM CHLORIDE 9 MG/ML
125 INJECTION, SOLUTION INTRAVENOUS CONTINUOUS
Status: DISCONTINUED | OUTPATIENT
Start: 2019-11-19 | End: 2019-11-19 | Stop reason: HOSPADM

## 2019-11-19 RX ORDER — NEOSTIGMINE METHYLSULFATE 1 MG/ML
INJECTION INTRAVENOUS AS NEEDED
Status: DISCONTINUED | OUTPATIENT
Start: 2019-11-19 | End: 2019-11-19 | Stop reason: SURG

## 2019-11-19 RX ORDER — FENTANYL CITRATE/PF 50 MCG/ML
50 SYRINGE (ML) INJECTION
Status: COMPLETED | OUTPATIENT
Start: 2019-11-19 | End: 2019-11-19

## 2019-11-19 RX ORDER — MIDAZOLAM HYDROCHLORIDE 2 MG/2ML
INJECTION, SOLUTION INTRAMUSCULAR; INTRAVENOUS AS NEEDED
Status: DISCONTINUED | OUTPATIENT
Start: 2019-11-19 | End: 2019-11-19 | Stop reason: SURG

## 2019-11-19 RX ORDER — GLYCOPYRROLATE 0.2 MG/ML
INJECTION INTRAMUSCULAR; INTRAVENOUS AS NEEDED
Status: DISCONTINUED | OUTPATIENT
Start: 2019-11-19 | End: 2019-11-19 | Stop reason: SURG

## 2019-11-19 RX ADMIN — GLYCOPYRROLATE 0.6 MG: 0.2 INJECTION INTRAMUSCULAR; INTRAVENOUS at 07:49

## 2019-11-19 RX ADMIN — SCOPALAMINE 1 PATCH: 1 PATCH, EXTENDED RELEASE TRANSDERMAL at 06:27

## 2019-11-19 RX ADMIN — NEOSTIGMINE METHYLSULFATE 3 MG: 1 INJECTION, SOLUTION INTRAVENOUS at 07:49

## 2019-11-19 RX ADMIN — SODIUM CHLORIDE 125 ML/HR: 0.9 INJECTION, SOLUTION INTRAVENOUS at 08:15

## 2019-11-19 RX ADMIN — FENTANYL CITRATE 100 MCG: 50 INJECTION, SOLUTION INTRAMUSCULAR; INTRAVENOUS at 07:25

## 2019-11-19 RX ADMIN — FENTANYL CITRATE 100 MCG: 50 INJECTION, SOLUTION INTRAMUSCULAR; INTRAVENOUS at 07:18

## 2019-11-19 RX ADMIN — MIDAZOLAM 2 MG: 1 INJECTION INTRAMUSCULAR; INTRAVENOUS at 07:18

## 2019-11-19 RX ADMIN — FENTANYL CITRATE 100 MCG: 50 INJECTION, SOLUTION INTRAMUSCULAR; INTRAVENOUS at 07:33

## 2019-11-19 RX ADMIN — SODIUM CHLORIDE 125 ML/HR: 0.9 INJECTION, SOLUTION INTRAVENOUS at 05:35

## 2019-11-19 RX ADMIN — LIDOCAINE HYDROCHLORIDE 60 MG: 20 INJECTION, SOLUTION INTRAVENOUS at 07:20

## 2019-11-19 RX ADMIN — OXYCODONE HYDROCHLORIDE AND ACETAMINOPHEN 2 TABLET: 5; 325 TABLET ORAL at 10:00

## 2019-11-19 RX ADMIN — DEXAMETHASONE SODIUM PHOSPHATE 8 MG: 4 INJECTION, SOLUTION INTRAMUSCULAR; INTRAVENOUS at 07:36

## 2019-11-19 RX ADMIN — PROPOFOL 200 MG: 10 INJECTION, EMULSION INTRAVENOUS at 07:20

## 2019-11-19 RX ADMIN — FENTANYL CITRATE 50 MCG: 50 INJECTION, SOLUTION INTRAMUSCULAR; INTRAVENOUS at 08:14

## 2019-11-19 RX ADMIN — ONDANSETRON 4 MG: 2 INJECTION INTRAMUSCULAR; INTRAVENOUS at 07:35

## 2019-11-19 RX ADMIN — FENTANYL CITRATE 50 MCG: 50 INJECTION, SOLUTION INTRAMUSCULAR; INTRAVENOUS at 08:19

## 2019-11-19 RX ADMIN — ROCURONIUM BROMIDE 30 MG: 50 INJECTION, SOLUTION INTRAVENOUS at 07:20

## 2019-11-19 NOTE — ANESTHESIA PREPROCEDURE EVALUATION
Review of Systems/Medical History      No history of anesthetic complications     Cardiovascular  Exercise tolerance (METS): >4,     Pulmonary  Negative pulmonary ROS        GI/Hepatic  Negative GI/hepatic ROS          Negative  ROS        Endo/Other  Negative endo/other ROS   Obesity    GYN       Hematology   Musculoskeletal  Negative musculoskeletal ROS        Neurology    Headaches,    Psychology   Negative psychology ROS              Physical Exam    Airway    Mallampati score: II  TM Distance: >3 FB  Neck ROM: full     Dental   Comment: Recent root canal, No notable dental hx     Cardiovascular  Cardiovascular exam normal    Pulmonary  Pulmonary exam normal     Other Findings        Anesthesia Plan  ASA Score- 2     Anesthesia Type- general with ASA Monitors  Additional Monitors:   Airway Plan: ETT  Plan Factors-    Induction- intravenous  Postoperative Plan-     Informed Consent- Anesthetic plan and risks discussed with patient

## 2019-11-19 NOTE — ADDENDUM NOTE
Addendum  created 11/19/19 0925 by Jane Child MD    Intraprocedure Event edited, Intraprocedure Staff edited

## 2019-11-19 NOTE — ANESTHESIA POSTPROCEDURE EVALUATION
Post-Op Assessment Note    CV Status:  Stable    Pain management: adequate     Mental Status:  Alert and awake   Hydration Status:  Euvolemic   PONV Controlled:  Controlled   Airway Patency:  Patent   Post Op Vitals Reviewed: Yes      Staff: Anesthesiologist           /52 (11/19/19 0816)    Temp      Pulse 62 (11/19/19 0827)   Resp 15 (11/19/19 0827)    SpO2 95 % (11/19/19 0827)

## 2019-11-19 NOTE — INTERVAL H&P NOTE
H&P reviewed  After examining the patient I find no changes in the patients condition since the H&P had been written      Vitals:    11/19/19 0526   BP: 122/67   Pulse: 81   Resp: 16   Temp: (!) 96 8 °F (36 °C)   SpO2: 98%

## 2019-11-19 NOTE — OP NOTE
OPERATIVE REPORT  PATIENT NAME: Naty Lamas    :  1975  MRN: [id number]  Pt Location: [facility] OR ROOM 03    SURGERY DATE: 2019    Surgeon(s) and Role:     * Krystina Tao DO - Primary    Preop Diagnosis:  Tonsillitis and adenoiditis, chronic [J35 03]  Nasal congestion [R09 81]  Hypertrophy of both inferior nasal turbinates [J34 3]  Other chronic diseases of tonsils and adenoids [J35 8]    Post-Op Diagnosis Codes:     * Tonsillitis and adenoiditis, chronic [J35 03]     * Nasal congestion [R09 81]     * Hypertrophy of both inferior nasal turbinates [J34 3]     * Other chronic diseases of tonsils and adenoids [J35 8]    Procedure(s) (LRB):  TONSILLECTOMY (Bilateral)  PARTIAL INFERIOR TURBINECTOMY WITH OUTFRACTURE (N/A)    Specimen(s):  ID Type Source Tests Collected by Time Destination   1 : BL TONSILS Tissue Tonsil TISSUE EXAM Krystina Tao DO 2019 0732        Estimated Blood Loss:   Minimal    Drains:  * No LDAs found *    Anesthesia Type:   General    Operative Indications: Tonsillitis and adenoiditis, chronic [J35 03]  Nasal congestion [R09 81]  Hypertrophy of both inferior nasal turbinates [J34 3]  Other chronic diseases of tonsils and adenoids [J35 8]      Operative Findings:  Cryptic tonsils  Inferior turbinate hypertrophy    Complications:   None    Procedure and Technique:  Patient was identified in the holding area and taken to the OR  Patient was placed on the OR table in supine position and placed under general anesthesia with an endotracheal tube  Table was turned 90 degrees and prepped and draped in usual fashion for the above procedure  Time out was obtained and all information correct and agreed upon by surgeon, OR staff and anesthesia  The oral cavity was opened using a McGyver mouthgag and held in place with franco stand suspension  Evaluation of the oral cavity revealed that the left tonsil was grade 2 and the right tonsil was grade 2     Cocaine soaked cotton pledgets x2 were placed in each nasal cavity and left in place  Attention was first turned to the right tonsil  This was grasped with a curved Mica forceps and pulled medially  Using the coblation wand on the standard settings the tonsil was removed from the tonsillar fossa using the coblation setting  Any visible vessels which were seen just under the tissue were coagulated with the coagulation setting on the unit  The same procedure was then completed on the opposite side  At the completion of the tonsillectomy the fossae were dry  Soft suction catheter was then passed into the esophagus and stomach to remove any gastric contents and then removed  The mouthgag was let down and then reopened to evaluated the oropharyngeal area to make sure that there was no bleeding  Once confirmed, the mouthgag was removed  Cottonn pledgets were then removed from the nasal cavity and 1% xylocaine with 1 to [id number] epinephrine was injected into each of the inferior turbinates to a total of 3 mL  Pledgets were then reintroduced for another 2 minutes and then removed  I then completed bilateral inferior turbinate reduction using the Coblation Wand  Three submucosal tunnels were made in each of the inferior turbinates in the usual fashion holding the Coblator kathie at each marked for approximately 10 seconds  At the completion outfracture of each inferior turbinate was completed using a large nasal speculum  Suction was used to remove any blood in the nasopharynx  The patient was then awoken, extubated and taken to the PACU in stable condition       I was present for the entire procedure    Patient Disposition:  PACU     SIGNATURE: Zaid Zapien DO  DATE: November 19, 2019  TIME: 7:51 AM

## 2019-11-19 NOTE — DISCHARGE INSTRUCTIONS
Dorinda Esteves  1975      [facility]      Postoperative tonsillectomy instructions    1  Force fluids as much as possible  This will promote healing and maintain adequate hydration  Certain fruit juices such as apple and apricot juice, soft drinks, and frozen drink bars are suggested  2   Do not drink through a straw  This may cause bleeding if there is contact with the surgical site  3   Milk or ice cream should be avoided for the first 24 hours due to increased mucus production  Soft foods like gelatin, ice cream, custard, puddings, and mashed foods are helpful to maintain adequate nutrition  Spicy, scratchy, or rough foods such as toast, crackers, and potato chips should be avoided since they may scratch the tonsil site and cause bleeding  4   No red colored food or liquid  5   A moderate amount of throat and ear discomfort is to be expected  6   Foul-smelling breath is normal and is not a sign of infection  7   You may see crusty white patches in your throat  This is a temporary normal covering during the healing period and is NOT a sign of infection  After the first week the white patches can be expected to come off and may cause slight bleeding  The best way to prevent buildup of too much crusting and bleeding is to keep the throat moist with lots of fluids  8   You should have lots of rest and limited activity at home until your first postoperative visit  No strenuous activities, bending, or lifting until approved by the surgeon  No travel out of your immediate area  9   If severe pain, bleeding, or fever greater than 101°F notify our office immediately at [phone] or [phone] or go immediately to the emergency room  10   If a prescription for pain medication was given follow appropriate directions on label  If no prescription was given you may take over the counter pain medication as needed      11   If a prescription for steroids (Prednisone, prednisolone) was given you may begin taking this medication the day following the surgical procedure  12  No smoking  Avoid second hand smoke exposure  Post Operative Turbinate Reduction Instructions    1  Rinse the nose with a debi pot or Sinus Rinse Kit two times daily over the next few weeks  As congestion improves you may decrease the frequency of use  2   If you were given post operative pain medication please follow appropriate instructions on label  If no prescription was given you may take over the counter pain medication as needed  3   If you have any bleeding in the first few days you may squirt over the counter Afrin (Oxymetazoline) into the nostril  Two squirts to the affected side every few hours as needed to control bleeding  If the bleeding is significant or is not stopped with the medication please call [facility] at [phone] or [phone]       4   Use a nasal drip pad if needed following the procedure  Change as needed  5   Expect to become more congested in the first week following the procedure  6   Do not blow the nose for the first 48 hours following your surgery  Following this you may gently blow the nose following a nasal rinse  7   Avoid any strenuous activity, nose blowing, or heavy lifting for 48 hours following the procedure  It is possible that these activities will induce bleeding  8   A follow up appointment will be given to you at the completion of the procedure  Please keep the appointment  9   If you have any significant bleeding that is not controlled, fever greater than 101 degrees Farenheit, or any other abnormal symptoms please contact [facility] at the number given above  10   No smoking  Avoid second hand smoke exposure

## 2019-12-31 ENCOUNTER — TELEPHONE (OUTPATIENT)
Dept: FAMILY MEDICINE CLINIC | Facility: CLINIC | Age: 44
End: 2019-12-31

## 2019-12-31 ENCOUNTER — OFFICE VISIT (OUTPATIENT)
Dept: FAMILY MEDICINE CLINIC | Facility: CLINIC | Age: 44
End: 2019-12-31
Payer: COMMERCIAL

## 2019-12-31 VITALS
WEIGHT: 209.8 LBS | DIASTOLIC BLOOD PRESSURE: 74 MMHG | SYSTOLIC BLOOD PRESSURE: 112 MMHG | BODY MASS INDEX: 34.95 KG/M2 | HEIGHT: 65 IN | OXYGEN SATURATION: 99 % | HEART RATE: 85 BPM | TEMPERATURE: 98 F

## 2019-12-31 DIAGNOSIS — M72.2 PLANTAR FASCIITIS OF LEFT FOOT: Primary | ICD-10-CM

## 2019-12-31 PROCEDURE — 99213 OFFICE O/P EST LOW 20 MIN: CPT | Performed by: FAMILY MEDICINE

## 2019-12-31 NOTE — LETTER
December 31, 2019     Patient: Melody Kirkland   YOB: 1975   Date of Visit: 12/31/2019       To Whom it May Concern:    Rowena Huizar is under my professional care  She was seen in my office on 12/31/2019  She may return to work on 12/31/2019  If you have any questions or concerns, please don't hesitate to call           Sincerely,          Gregorio Coy MD        CC: No Recipients

## 2019-12-31 NOTE — LETTER
To whom it may concern:      Ms Parul Grider has left plantar fasciitis which is exacerbated by standing  She would benefit from sitting every 2 hours for 15 to 30 minutes to alleviate some of the pressure on her left foot  If you have any questions or concerns feel free to contact us at [phone]          Sincerely,          Eugenie Ortiz MD

## 2019-12-31 NOTE — PROGRESS NOTES
Assessment/Plan:    No problem-specific Assessment & Plan notes found for this encounter  Diagnoses and all orders for this visit:    Plantar fasciitis of left foot  After discussing with patient recommended for her to try insoles and NSAids such as Naproxen or Ibuprofen  Follow up as needed        Subjective:      Patient ID: Lucila Ruelas is a 38 y o  female  Patient is here due to left heel pain that has been going on for the past several weeks  She deneis any recent injuries to her left foot  She works at [employer] and has to stand for prolonged periods of time  She has not tried anything for her symptoms  The following portions of the patient's history were reviewed and updated as appropriate:   She  has a past medical history of Migraine, Nasal turbinate hypertrophy, Tonsillitis, chronic, and Wears glasses  She   Patient Active Problem List    Diagnosis Date Noted    Plantar fasciitis of left foot 2019    Nasal congestion 10/28/2019    Hypertrophy of both inferior nasal turbinates 10/28/2019    Other chronic diseases of tonsils and adenoids 10/28/2019    Acute left-sided low back pain without sciatica 10/23/2019    Screening mammogram, encounter for 10/23/2019    Tonsillitis and adenoiditis, chronic 10/23/2019    Dysuria 10/23/2019    Sore throat 2019    Encounter for gynecological examination without abnormal finding 12/10/2018    Lipid screening 2018    Fatigue 2018    Prediabetes 2018    Anemia 2017    Constipation 10/03/2017    Irritable bowel syndrome with constipation 10/03/2017     She  has a past surgical history that includes  section; Appendectomy; Tubal ligation; TONSILLECTOMY (Bilateral, 2019); and Turbinate resection (N/A, 2019)  Her family history includes Breast cancer in her paternal aunt; Cancer in her family; No Known Problems in her father and mother  She  reports that she has never smoked   She has never used smokeless tobacco  She reports that she drinks about 1 0 standard drinks of alcohol per week  She reports that she does not use drugs  Current Outpatient Medications   Medication Sig Dispense Refill    clobetasol (TEMOVATE) 0 05 % cream Apply topically daily for 7 days (Patient taking differently: Apply topically as needed ) 30 g 0    oxyCODONE-acetaminophen (PERCOCET) 5-325 mg per tablet Take 1 tablet by mouth every 6 (six) hours as needed for moderate painMax Daily Amount: 4 tablets 10 tablet 0    predniSONE 20 mg tablet 3 tabs day 1-3, 2 tabs day 4-6, 1 tab day 7-9 18 tablet 0     No current facility-administered medications for this visit  Current Outpatient Medications on File Prior to Visit   Medication Sig    clobetasol (TEMOVATE) 0 05 % cream Apply topically daily for 7 days (Patient taking differently: Apply topically as needed )    oxyCODONE-acetaminophen (PERCOCET) 5-325 mg per tablet Take 1 tablet by mouth every 6 (six) hours as needed for moderate painMax Daily Amount: 4 tablets    predniSONE 20 mg tablet 3 tabs day 1-3, 2 tabs day 4-6, 1 tab day 7-9     No current facility-administered medications on file prior to visit  She has No Known Allergies       Review of Systems   Constitutional: Negative for activity change, appetite change, fatigue and fever  HENT: Negative for congestion and ear discharge  Respiratory: Negative for cough and shortness of breath  Cardiovascular: Negative for chest pain and palpitations  Gastrointestinal: Negative for diarrhea and nausea  Musculoskeletal: Positive for arthralgias and myalgias  Negative for back pain  Skin: Negative for color change and rash  Neurological: Negative for dizziness and headaches  Psychiatric/Behavioral: Negative for agitation and behavioral problems           Objective:      /74   Pulse 85   Temp 98 °F (36 7 °C) (Tympanic)   Ht 5' 5" (1 651 m)   Wt 95 2 kg (209 lb 12 8 oz)   SpO2 99%   BMI 34 91 kg/m²          Physical Exam   Constitutional: She is oriented to person, place, and time  She appears well-developed and well-nourished  No distress  HENT:   Head: Normocephalic and atraumatic  Nose: Nose normal    Mouth/Throat: Oropharynx is clear and moist    Eyes: Pupils are equal, round, and reactive to light  Conjunctivae are normal    Cardiovascular: Normal rate, regular rhythm and normal heart sounds  No murmur heard  Pulmonary/Chest: Effort normal and breath sounds normal  No respiratory distress  She has no wheezes  Abdominal: Soft  Bowel sounds are normal  She exhibits no distension  There is no tenderness  Musculoskeletal: She exhibits tenderness  She exhibits no edema or deformity  Left heel tenderness   Neurological: She is alert and oriented to person, place, and time  Skin: Skin is warm and dry  No rash noted  She is not diaphoretic  No erythema  Psychiatric: She has a normal mood and affect

## 2020-01-28 ENCOUNTER — ANNUAL EXAM (OUTPATIENT)
Dept: OBGYN CLINIC | Facility: CLINIC | Age: 45
End: 2020-01-28
Payer: COMMERCIAL

## 2020-01-28 VITALS
DIASTOLIC BLOOD PRESSURE: 78 MMHG | SYSTOLIC BLOOD PRESSURE: 120 MMHG | WEIGHT: 209.4 LBS | BODY MASS INDEX: 34.89 KG/M2 | HEIGHT: 65 IN

## 2020-01-28 DIAGNOSIS — N94.10 FEMALE DYSPAREUNIA: ICD-10-CM

## 2020-01-28 DIAGNOSIS — Z01.419 ENCOUNTER FOR GYNECOLOGICAL EXAMINATION WITHOUT ABNORMAL FINDING: ICD-10-CM

## 2020-01-28 DIAGNOSIS — Z01.419 ENCOUNTER FOR ANNUAL ROUTINE GYNECOLOGICAL EXAMINATION: Primary | ICD-10-CM

## 2020-01-28 DIAGNOSIS — Z11.3 SCREENING FOR STDS (SEXUALLY TRANSMITTED DISEASES): ICD-10-CM

## 2020-01-28 PROBLEM — R30.0 DYSURIA: Status: RESOLVED | Noted: 2019-10-23 | Resolved: 2020-01-28

## 2020-01-28 PROBLEM — Z13.220 LIPID SCREENING: Status: RESOLVED | Noted: 2018-04-17 | Resolved: 2020-01-28

## 2020-01-28 PROBLEM — Z12.31 SCREENING MAMMOGRAM, ENCOUNTER FOR: Status: RESOLVED | Noted: 2019-10-23 | Resolved: 2020-01-28

## 2020-01-28 PROBLEM — M54.50 ACUTE LEFT-SIDED LOW BACK PAIN WITHOUT SCIATICA: Status: RESOLVED | Noted: 2019-10-23 | Resolved: 2020-01-28

## 2020-01-28 PROBLEM — K59.00 CONSTIPATION: Status: RESOLVED | Noted: 2017-10-03 | Resolved: 2020-01-28

## 2020-01-28 PROBLEM — J02.9 SORE THROAT: Status: RESOLVED | Noted: 2019-01-21 | Resolved: 2020-01-28

## 2020-01-28 PROBLEM — J35.03 TONSILLITIS AND ADENOIDITIS, CHRONIC: Status: RESOLVED | Noted: 2019-10-23 | Resolved: 2020-01-28

## 2020-01-28 PROCEDURE — 99396 PREV VISIT EST AGE 40-64: CPT | Performed by: OBSTETRICS & GYNECOLOGY

## 2020-01-28 PROCEDURE — 87591 N.GONORRHOEAE DNA AMP PROB: CPT | Performed by: OBSTETRICS & GYNECOLOGY

## 2020-01-28 PROCEDURE — 87491 CHLMYD TRACH DNA AMP PROBE: CPT | Performed by: OBSTETRICS & GYNECOLOGY

## 2020-01-28 RX ORDER — ESTRADIOL 0.1 MG/G
0.5 CREAM VAGINAL 2 TIMES WEEKLY
Qty: 42.5 G | Refills: 3 | Status: SHIPPED | OUTPATIENT
Start: 2020-01-30

## 2020-01-28 NOTE — PATIENT INSTRUCTIONS
Dyspareunia in Women   WHAT YOU NEED TO KNOW:   What is dyspareunia? Dyspareunia is pain during intercourse (sex)  You may have pain before, during, or after sex  You may have pain every time you have sex, or only certain times  You may have had pain since the first time you had sex, or the pain might start suddenly  Dyspareunia may cause you to feel embarrassed or cause problems in your relationship with your partner  Many causes of dyspareunia can be treated  It is important for you to talk with your healthcare provider about your symptoms  What causes or increases my risk for dyspareunia? · Hormone changes that happen just before or during menopause, or because of breastfeeding    · Stress, anxiety, or emotional problems    · An episiotomy scar after childbirth, irritation in or around your vagina, or an abnormal vaginal structure    · Irritation from a lack of lubrication during sex    · An infection, trauma, or tumor    · Endometriosis, or surgery in your lower abdomen    · Pregnancy, or a recent vaginal delivery    · Constipation, or irritable bowel syndrome     · Use of drugs, alcohol, or medicines such as antihistamines  What are the signs and symptoms of dyspareunia? Signs and symptoms will depend on the cause  You may have any of the following:  · Pain anywhere from the opening of your vagina to your abdomen    · Pain with penetration, including when you insert a sex toy    · A feeling of pressure or burning anywhere in your vagina    · Less interest in having sex, trouble becoming aroused, or trouble having an orgasm    · A watery discharge from your vagina  How is dyspareunia diagnosed? Your healthcare provider will examine your vagina for possible causes of your pain  Your bladder, rectum, or other parts of your lower abdomen may also need to be examined  · Blood or urine tests  may be used to check for an infection       · A vaginal exam  will help check for problems at the opening or inside your vagina that can cause dyspareunia  Your healthcare provider may insert a finger into your vagina  A cotton swab may be used to check for pain or other symptoms  Talk to your healthcare provider if you have any anxiety or concerns about an exam      · A colposcopy  is a procedure used to check your vagina and cervix (opening to your uterus)  Your healthcare provider will insert a scope into your vagina for the exam  A sample of tissue may be taken during the colposcopy  · Laparoscopy  is surgery used to check the uterus, fallopian tubes, and ovaries  Your healthcare provider will make an incision in your lower abdomen  A thin tube with a light on the end is put through the incision  Laparoscopy is used to check for a tumor, fibroid, or other problem causing your pain  Your healthcare provider may also take a tissue sample  How is dyspareunia treated? · Medicine  may be given to treat an infection or to relieve pain  Pain medicine may be given as a pill or as a cream you can apply to your vagina  · Estrogen  may be given as a cream, a pill, or a ring that fits in your vagina  Estrogen may help relieve your symptoms if they are caused by low estrogen levels  · Lubricant  can help make sex more comfortable  Lubricants are available without a prescription  Talk to your healthcare provider about which kind to use if you are using condoms for birth control  Oil-based lubricants can damage condoms  Choose a silicone-based or water-based lubricant  · Surgery  may be needed to remove a tumor or fibroid  Surgery can also be used to remove extra tissue from your vagina, or to widen your vagina  You may need surgery to fix a problem with a structure in your lower abdomen  What can I do to manage dyspareunia? · A sitz bath  may help reduce inflammation  To make a sitz bath, fill the bathtub with warm water until it is at about the level of your belly button  Stay in the bath for about 15 to 20 minutes   A sitz bath is also available as a small tub that will fit under your toilet seat  You will fill the tub with water as directed once it is under the toilet seat  Your healthcare provider can tell you how often to use a sitz bath  · Kegel exercises  may be recommended to help strengthen your pelvic muscles  Pelvic muscles hold your pelvic organs, such as your bladder and uterus, in place  To do Kegel exercises, tighten your pelvic muscles slowly  It should feel like you are trying to hold back urine  Hold these muscles and count to 3  Relax, tighten them quickly, and release  Repeat the cycle 10 times  · A change of position  can help make sex more comfortable  You might also want to try having sex at different times of the month if you have monthly periods  This will help you find a time of the month that is most comfortable for you  · Therapy  with a mental health counselor may help you feel less anxious about sex  You can talk to a counselor by yourself or with your partner  When should I contact my healthcare provider? · You have new or worsening symptoms  · You have questions or concerns about your condition or care  CARE AGREEMENT:   You have the right to help plan your care  Learn about your health condition and how it may be treated  Discuss treatment options with your caregivers to decide what care you want to receive  You always have the right to refuse treatment  The above information is an  only  It is not intended as medical advice for individual conditions or treatments  Talk to your doctor, nurse or pharmacist before following any medical regimen to see if it is safe and effective for you  © 2017 [employer]  Information is for End User's use only and may not be sold, redistributed or otherwise used for commercial purposes  All illustrations and images included in CareNotes® are the copyrighted property of A D A M , Inc  or [employer]

## 2020-01-28 NOTE — ASSESSMENT & PLAN NOTE
Pap/HPV current  Contraception tubal  Mammogram - not done    Vulvar irritation - at this time no sign of infection or inflammation    Recommend trial of estrogen cream to see if this helps with symptoms as well as water based lubricant with intercourse ( condom use for STD prevention)

## 2020-01-28 NOTE — PROGRESS NOTES
Assessment/Plan:    Encounter for gynecological examination without abnormal finding  Pap/HPV current  Contraception tubal  Mammogram - not done    Vulvar irritation - at this time no sign of infection or inflammation  Recommend trial of estrogen cream to see if this helps with symptoms as well as water based lubricant with intercourse ( condom use for STD prevention)       Diagnoses and all orders for this visit:    Encounter for annual routine gynecological examination    Screening for STDs (sexually transmitted diseases)  -     Chlamydia/GC amplified DNA by PCR    Female dyspareunia  -     estradiol (ESTRACE) 0 1 mg/g vaginal cream; Insert 0 5 g into the vagina 2 (two) times a week    Encounter for gynecological examination without abnormal finding          Subjective:      Patient ID: Blanca Blake is a 38 y o  female  Patient here for yearly exam  C/o vaginal itching, no d/c no odor    Age of first period: 16 y/o  LMP: 21  Birth Control: tubal   Last pap: 17 neg/HPV-  Last mammo- never  Pt is not a smoker  Pt is a social drinker  Pt does not exercise     Vulvar itching - no discharge  Now   Again is sexually active with new partner - using condoms  Feels irritation especially after sex  Living in home with  and kids   [place]    Working 2 jobs  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  Daily job is at [employer]  Menses are regular        Gynecologic Exam   The patient's primary symptoms include genital itching  The patient's pertinent negatives include no genital lesions, genital odor, genital rash, pelvic pain, vaginal bleeding or vaginal discharge  The patient is experiencing no pain  Associated symptoms include painful intercourse  Pertinent negatives include no chills, constipation, diarrhea, fever, frequency, nausea, sore throat, urgency or vomiting  She is sexually active   She uses condoms and tubal ligation for contraception  Her menstrual history has been regular  The following portions of the patient's history were reviewed and updated as appropriate:   She  has a past medical history of Migraine, Nasal turbinate hypertrophy, Tonsillitis, chronic, and Wears glasses  She   Patient Active Problem List    Diagnosis Date Noted    Plantar fasciitis of left foot 2019    Nasal congestion 10/28/2019    Hypertrophy of both inferior nasal turbinates 10/28/2019    Other chronic diseases of tonsils and adenoids 10/28/2019    Encounter for gynecological examination without abnormal finding 12/10/2018    Fatigue 2018    Prediabetes 2018    Anemia 2017    Irritable bowel syndrome with constipation 10/03/2017     She  has a past surgical history that includes  section; Appendectomy; Tubal ligation; TONSILLECTOMY (Bilateral, 2019); and Turbinate resection (N/A, 2019)  Her family history includes Breast cancer in her paternal aunt; Cancer in her family; No Known Problems in her father and mother  She  reports that she has never smoked  She has never used smokeless tobacco  She reports that she drinks about 1 0 standard drinks of alcohol per week  She reports that she does not use drugs  Current Outpatient Medications   Medication Sig Dispense Refill    [START ON 2020] estradiol (ESTRACE) 0 1 mg/g vaginal cream Insert 0 5 g into the vagina 2 (two) times a week 42 5 g 3     No current facility-administered medications for this visit        Current Outpatient Medications on File Prior to Visit   Medication Sig    [DISCONTINUED] clobetasol (TEMOVATE) 0 05 % cream Apply topically daily for 7 days (Patient taking differently: Apply topically as needed )    [DISCONTINUED] oxyCODONE-acetaminophen (PERCOCET) 5-325 mg per tablet Take 1 tablet by mouth every 6 (six) hours as needed for moderate painMax Daily Amount: 4 tablets (Patient not taking: Reported on 1/28/2020)    [DISCONTINUED] predniSONE 20 mg tablet 3 tabs day 1-3, 2 tabs day 4-6, 1 tab day 7-9 (Patient not taking: Reported on 1/28/2020)     No current facility-administered medications on file prior to visit  She has No Known Allergies       Review of Systems   Constitutional: Negative for activity change, appetite change, chills, fatigue and fever  HENT: Negative for rhinorrhea, sneezing and sore throat  Eyes: Negative for visual disturbance  Respiratory: Negative for cough, shortness of breath and wheezing  Cardiovascular: Negative for chest pain, palpitations and leg swelling  Gastrointestinal: Negative for abdominal distention, constipation, diarrhea, nausea and vomiting  Genitourinary: Negative for difficulty urinating, frequency, pelvic pain, urgency and vaginal discharge  Neurological: Negative for syncope and light-headedness  Objective:      /78 (BP Location: Left arm, Patient Position: Sitting, Cuff Size: Standard)   Ht 5' 5" (1 651 m)   Wt 95 kg (209 lb 6 4 oz)   LMP 01/19/2020 (Exact Date)   BMI 34 85 kg/m²          Physical Exam   Constitutional: She is oriented to person, place, and time  Pulmonary/Chest: Right breast exhibits no inverted nipple, no mass, no nipple discharge, no skin change and no tenderness  Left breast exhibits no inverted nipple, no mass, no nipple discharge, no skin change and no tenderness  No breast tenderness, discharge or bleeding  Breasts are symmetrical    Genitourinary: Vagina normal and uterus normal  No breast tenderness, discharge or bleeding  There is no rash, tenderness, lesion or injury on the right labia  There is no rash, tenderness, lesion or injury on the left labia  Uterus is not deviated, not enlarged, not fixed and not tender  Cervix exhibits no motion tenderness, no discharge and no friability  Right adnexum displays no mass, no tenderness and no fullness   Left adnexum displays no mass, no tenderness and no fullness  No tenderness or bleeding in the vagina  No vaginal discharge found  Genitourinary Comments: Pale pink vaginal mucosa  No discharge  Vulva - deflated, no lesions   Neurological: She is alert and oriented to person, place, and time

## 2020-01-29 LAB
C TRACH DNA SPEC QL NAA+PROBE: NEGATIVE
N GONORRHOEA DNA SPEC QL NAA+PROBE: NEGATIVE

## 2020-02-04 ENCOUNTER — TELEPHONE (OUTPATIENT)
Dept: OBGYN CLINIC | Facility: CLINIC | Age: 45
End: 2020-02-04

## 2020-02-04 NOTE — TELEPHONE ENCOUNTER
Reviewed results per Dr Dickey Angry, pt thankful and no additional concerns   ----- Message from Patrick Valencia MD sent at 1/31/2020  3:07 AM EST -----  Please call Heaven Porter - cultures are negative

## 2020-03-13 ENCOUNTER — OCCMED (OUTPATIENT)
Dept: URGENT CARE | Facility: CLINIC | Age: 45
End: 2020-03-13
Payer: COMMERCIAL

## 2020-03-13 DIAGNOSIS — Z02.1 PRE-EMPLOYMENT EXAMINATION: Primary | ICD-10-CM

## 2020-03-13 PROCEDURE — 86706 HEP B SURFACE ANTIBODY: CPT

## 2020-03-13 PROCEDURE — 86787 VARICELLA-ZOSTER ANTIBODY: CPT

## 2020-03-13 PROCEDURE — 86765 RUBEOLA ANTIBODY: CPT

## 2020-03-13 PROCEDURE — 86480 TB TEST CELL IMMUN MEASURE: CPT

## 2020-03-13 PROCEDURE — 86762 RUBELLA ANTIBODY: CPT

## 2020-03-13 PROCEDURE — 86735 MUMPS ANTIBODY: CPT

## 2020-03-14 LAB
HBV SURFACE AB SER-ACNC: 25.36 MIU/ML
RUBV IGG SERPL IA-ACNC: 112.7 IU/ML

## 2020-03-17 LAB
MEV IGG SER QL: NORMAL
MUV IGG SER QL: NORMAL
VZV IGG SER IA-ACNC: NORMAL

## 2020-03-18 LAB
GAMMA INTERFERON BACKGROUND BLD IA-ACNC: 0.2 IU/ML
M TB IFN-G BLD-IMP: NEGATIVE
M TB IFN-G CD4+ BCKGRND COR BLD-ACNC: -0.11 IU/ML
M TB IFN-G CD4+ BCKGRND COR BLD-ACNC: -0.12 IU/ML
MITOGEN IGNF BCKGRD COR BLD-ACNC: >10 IU/ML

## 2020-06-24 ENCOUNTER — OFFICE VISIT (OUTPATIENT)
Dept: FAMILY MEDICINE CLINIC | Facility: CLINIC | Age: 45
End: 2020-06-24
Payer: COMMERCIAL

## 2020-06-24 VITALS
OXYGEN SATURATION: 98 % | HEART RATE: 90 BPM | HEIGHT: 65 IN | WEIGHT: 223.4 LBS | SYSTOLIC BLOOD PRESSURE: 118 MMHG | BODY MASS INDEX: 37.22 KG/M2 | TEMPERATURE: 97.9 F | DIASTOLIC BLOOD PRESSURE: 82 MMHG

## 2020-06-24 DIAGNOSIS — Z13.220 NEED FOR LIPID SCREENING: ICD-10-CM

## 2020-06-24 DIAGNOSIS — Z90.89 S/P TONSILLECTOMY: ICD-10-CM

## 2020-06-24 DIAGNOSIS — R73.03 PREDIABETES: ICD-10-CM

## 2020-06-24 DIAGNOSIS — Z12.39 BREAST CANCER SCREENING: ICD-10-CM

## 2020-06-24 DIAGNOSIS — R09.81 NASAL CONGESTION: Primary | ICD-10-CM

## 2020-06-24 PROCEDURE — 3008F BODY MASS INDEX DOCD: CPT | Performed by: FAMILY MEDICINE

## 2020-06-24 PROCEDURE — 1036F TOBACCO NON-USER: CPT | Performed by: FAMILY MEDICINE

## 2020-06-24 PROCEDURE — 99213 OFFICE O/P EST LOW 20 MIN: CPT | Performed by: FAMILY MEDICINE

## 2020-07-01 ENCOUNTER — HOSPITAL ENCOUNTER (OUTPATIENT)
Dept: MAMMOGRAPHY | Facility: CLINIC | Age: 45
Discharge: HOME | End: 2020-07-01
Payer: COMMERCIAL

## 2020-07-01 VITALS — HEIGHT: 65 IN | WEIGHT: 213 LBS | BODY MASS INDEX: 35.49 KG/M2

## 2020-07-01 DIAGNOSIS — Z12.39 BREAST CANCER SCREENING: ICD-10-CM

## 2020-07-01 PROCEDURE — 77067 SCR MAMMO BI INCL CAD: CPT

## 2020-07-01 PROCEDURE — 77063 BREAST TOMOSYNTHESIS BI: CPT

## 2020-07-06 ENCOUNTER — TELEPHONE (OUTPATIENT)
Dept: MAMMOGRAPHY | Facility: CLINIC | Age: 45
End: 2020-07-06

## 2020-07-06 NOTE — TELEPHONE ENCOUNTER
Email sent to patient at [email or web address]    Vincenzo Gordon,    I have tried to contact you in reference to a mammogram that you had done at [facility] on July 1st at our [facility] location  I have attempted to call you many times with no answer and your voicemail box is full and I am unable to leave a message  If you could please contact me in reference to this screening I would appreciate it  My contact number is   Thanks,  Shu Carranza, MSN, RN, [address]  [address] HOSPITAL: [phone]  FX: [phone]  [email or web address]

## 2020-07-16 ENCOUNTER — HOSPITAL ENCOUNTER (OUTPATIENT)
Dept: MAMMOGRAPHY | Facility: CLINIC | Age: 45
Discharge: HOME | End: 2020-07-16
Payer: COMMERCIAL

## 2020-07-16 ENCOUNTER — HOSPITAL ENCOUNTER (OUTPATIENT)
Dept: ULTRASOUND IMAGING | Facility: CLINIC | Age: 45
Discharge: HOME | End: 2020-07-16
Payer: COMMERCIAL

## 2020-07-16 VITALS — HEIGHT: 65 IN | BODY MASS INDEX: 35.49 KG/M2 | WEIGHT: 213 LBS

## 2020-07-16 DIAGNOSIS — R92.8 ABNORMAL MAMMOGRAM: ICD-10-CM

## 2020-07-16 PROCEDURE — 76642 ULTRASOUND BREAST LIMITED: CPT

## 2020-07-16 PROCEDURE — 77065 DX MAMMO INCL CAD UNI: CPT

## 2020-07-16 PROCEDURE — G0279 TOMOSYNTHESIS, MAMMO: HCPCS

## 2020-07-16 NOTE — PROGRESS NOTES
Met with patient and Dr Arian Vilchis regarding recommendation for;    _____ RIGHT ___X___LEFT      _____Ultrasound guided  ___X___Stereotactic breast biopsy  __X___Verbalized understanding        Blood thinners:  _____yes __X___no    Date stopped: ___N/A____    Biopsy teaching sheet given:  __X___yes ____no    Pt given contact information and adv to call with any questions/needs

## 2020-07-29 ENCOUNTER — HOSPITAL ENCOUNTER (OUTPATIENT)
Dept: MAMMOGRAPHY | Facility: CLINIC | Age: 45
Discharge: HOME | End: 2020-07-29
Admitting: RADIOLOGY
Payer: COMMERCIAL

## 2020-07-29 ENCOUNTER — HOSPITAL ENCOUNTER (OUTPATIENT)
Dept: MAMMOGRAPHY | Facility: CLINIC | Age: 45
Discharge: HOME | End: 2020-07-29

## 2020-07-29 VITALS — BODY MASS INDEX: 35.49 KG/M2 | HEIGHT: 65 IN | WEIGHT: 213 LBS

## 2020-07-29 VITALS — DIASTOLIC BLOOD PRESSURE: 82 MMHG | SYSTOLIC BLOOD PRESSURE: 113 MMHG | HEART RATE: 68 BPM

## 2020-07-29 DIAGNOSIS — R92.8 ABNORMAL MAMMOGRAM: ICD-10-CM

## 2020-07-29 PROCEDURE — 88305 TISSUE EXAM BY PATHOLOGIST: CPT | Performed by: PATHOLOGY

## 2020-07-29 PROCEDURE — 19081 BX BREAST 1ST LESION STRTCTC: CPT

## 2020-07-29 RX ORDER — LIDOCAINE HYDROCHLORIDE AND EPINEPHRINE 20; 5 MG/ML; UG/ML
10 INJECTION, SOLUTION EPIDURAL; INFILTRATION; INTRACAUDAL; PERINEURAL ONCE
Status: COMPLETED | OUTPATIENT
Start: 2020-07-29 | End: 2020-07-29

## 2020-07-29 RX ORDER — LIDOCAINE HYDROCHLORIDE 10 MG/ML
5 INJECTION, SOLUTION EPIDURAL; INFILTRATION; INTRACAUDAL; PERINEURAL ONCE
Status: COMPLETED | OUTPATIENT
Start: 2020-07-29 | End: 2020-07-29

## 2020-07-29 RX ADMIN — LIDOCAINE HYDROCHLORIDE AND EPINEPHRINE 10 ML: 20; 5 INJECTION, SOLUTION EPIDURAL; INFILTRATION; INTRACAUDAL; PERINEURAL at 09:36

## 2020-07-29 RX ADMIN — LIDOCAINE HYDROCHLORIDE AND EPINEPHRINE 10 ML: 20; 5 INJECTION, SOLUTION EPIDURAL; INFILTRATION; INTRACAUDAL; PERINEURAL at 09:35

## 2020-07-29 RX ADMIN — LIDOCAINE HYDROCHLORIDE 5 ML: 10 INJECTION, SOLUTION EPIDURAL; INFILTRATION; INTRACAUDAL; PERINEURAL at 09:36

## 2020-07-29 NOTE — PROGRESS NOTES
Ice pack given:    ___x__yes _____no    Discharge instructions signed by patient:    ___x__yes _____no    Discharge instructions given to patient:    __x___yes _____no    Discharged via:    __x___amulatory    _____wheelchair    _____stretcher    Stable on discharge:    ___x__yes ____no  Patient would like results over the phone      8 passes ( #3,5,10 with calcifications, 5 without calcifications )

## 2020-07-29 NOTE — PROGRESS NOTES
Procedure type:    _____ultrasound guided ___x__stereotactic    Breast:    __x___Left _____Right    Location: 3 o'clock     Needle: 10 Gauge mammotome     # of passes: 8 ( 3 with calcifications, 5 without calcifications )    Clip: Mammotome corbin    Performed by: Dr Tori Carroll held for 5 minutes by: Tony Khanna     Steramelia Strips:    ___x__yes _____no    Band aid:    _____yes__x___no    Tape and guaze:    _____yes __x___no    Tolerated procedure:    ___x__yes _____no

## 2020-07-29 NOTE — DISCHARGE INSTR - OTHER ORDERS
POST LARGE CORE BREAST BIOPSY PATIENT INFORMATION      1  Place an ice pack inside your bra over the top of the dressing every hour for 20 minutes (20 minutes on, 60 minutes off)  Do this until bedtime  2  Do not shower or bathe until the following morning  3  You may bathe your breast carefully with the steri-strips in place  Be careful    Not to loosen them  The steri-strips will fall off in 3-5 days  4  You may have mild discomfort, and you may have some bruising where the   Needle entered the skin  This should clear within 5-7 days  5  If you need medicine for discomfort, take acetaminophen products such as   Tylenol  You may also take Advil or Motrin products  6  Do not participate in strenuous activities such as-tennis, aerobics, skiing,  Weight lifting, etc  for 24 hours  Refrain from swimming/soaking for 72 hours  7  Wearing a bra for sleeping may be more comfortable for the first 24-48 hours  8  Watch for continued bleeding, pain or fever over 101; please call with any questions or concerns  For [facility] at the [facility] [address] call:  Cong Menon RN at [phone]  Alice Thomas RN at [phone]                    *After 4 PM call the Interventional Radiology Department                    [phone] and ask to speak with the nurse on call  For procedures done at the [address] [place] [address] call:         Lydia Zhang RN at   *After 4 PM call the Interventional Radiology Department   [phone] and ask to speak with the nurse on call  For procedures done at [address] [place] [address] call: The Radiology Nurse at [phone]  *After 4 PM call your physician, or go to the Emergency Department  9          The final results of your biopsy are usually available within one week

## 2020-07-31 ENCOUNTER — TELEPHONE (OUTPATIENT)
Dept: MAMMOGRAPHY | Facility: CLINIC | Age: 45
End: 2020-07-31

## 2020-08-03 ENCOUNTER — TELEPHONE (OUTPATIENT)
Dept: MAMMOGRAPHY | Facility: CLINIC | Age: 45
End: 2020-08-03

## 2020-08-03 NOTE — TELEPHONE ENCOUNTER
Email sent to patient at [email or web address]    Vincenzo Gordon,    I have tried to contact you in reference to your biopsy that you had done at [facility] on July 29th at our [facility] location  I have attempted to call you many times with no answer and your voicemail box is full and I am unable to leave a message  If you could please contact me in reference to this I would appreciate it  My contact number is   Thanks,  Chichi Leger, MSN, RN, [address]  [address]  [facility]: [phone]  FX: [phone]  [email or web address]

## 2020-08-04 ENCOUNTER — TELEPHONE (OUTPATIENT)
Dept: MAMMOGRAPHY | Facility: CLINIC | Age: 45
End: 2020-08-04

## 2020-08-04 NOTE — TELEPHONE ENCOUNTER
August 4, 2020    Dear Warren Patel,     I have been unsuccessful in contacting you in reference to the biopsy that you had done on Wednesday July 29th at our [facility]  I am attempting to get in contact with you to give you the results of your biopsy  I have attempted to call you on multiple occasions however I have been unable to speak with you and am unable to leave a message due to your voicemail being full  I also sent you an email that you did not respond to  Please contact me directly to discuss your results as soon as possible        Sincerely,          Anselmo Stallings, MSN, RN  Breast Nurse Navigator  [phone]

## 2020-11-05 DIAGNOSIS — N76.3 CHRONIC VULVITIS: Primary | ICD-10-CM

## 2020-11-05 RX ORDER — CLOBETASOL PROPIONATE 0.5 MG/G
CREAM TOPICAL
Qty: 30 G | Refills: 0 | Status: SHIPPED | OUTPATIENT
Start: 2020-11-05 | End: 2021-10-28 | Stop reason: ALTCHOICE

## 2020-11-09 ENCOUNTER — TELEPHONE (OUTPATIENT)
Dept: OBGYN CLINIC | Facility: CLINIC | Age: 45
End: 2020-11-09

## 2020-11-09 DIAGNOSIS — B37.9 YEAST INFECTION: Primary | ICD-10-CM

## 2020-11-09 RX ORDER — FLUCONAZOLE 150 MG/1
150 TABLET ORAL DAILY
Qty: 2 TABLET | Refills: 0 | Status: SHIPPED | OUTPATIENT
Start: 2020-11-09 | End: 2020-11-11

## 2020-12-10 ENCOUNTER — OFFICE VISIT (OUTPATIENT)
Dept: OBGYN CLINIC | Facility: CLINIC | Age: 45
End: 2020-12-10
Payer: COMMERCIAL

## 2020-12-10 VITALS — SYSTOLIC BLOOD PRESSURE: 122 MMHG | BODY MASS INDEX: 35.08 KG/M2 | WEIGHT: 210.8 LBS | DIASTOLIC BLOOD PRESSURE: 84 MMHG

## 2020-12-10 DIAGNOSIS — N76.1 SUBACUTE VAGINITIS: Primary | ICD-10-CM

## 2020-12-10 DIAGNOSIS — Z11.3 SCREENING FOR STD (SEXUALLY TRANSMITTED DISEASE): ICD-10-CM

## 2020-12-10 LAB
BV WHIFF TEST VAG QL: NEGATIVE
CLUE CELLS SPEC QL WET PREP: NEGATIVE
PH SMN: 6 [PH]
SL AMB POCT WET MOUNT: ABNORMAL
T VAGINALIS VAG QL WET PREP: NEGATIVE
YEAST VAG QL WET PREP: POSITIVE

## 2020-12-10 PROCEDURE — 87491 CHLMYD TRACH DNA AMP PROBE: CPT | Performed by: NURSE PRACTITIONER

## 2020-12-10 PROCEDURE — 87591 N.GONORRHOEAE DNA AMP PROB: CPT | Performed by: NURSE PRACTITIONER

## 2020-12-10 PROCEDURE — 87661 TRICHOMONAS VAGINALIS AMPLIF: CPT | Performed by: NURSE PRACTITIONER

## 2020-12-10 PROCEDURE — 87210 SMEAR WET MOUNT SALINE/INK: CPT | Performed by: NURSE PRACTITIONER

## 2020-12-10 PROCEDURE — 99213 OFFICE O/P EST LOW 20 MIN: CPT | Performed by: NURSE PRACTITIONER

## 2020-12-10 PROCEDURE — 1036F TOBACCO NON-USER: CPT | Performed by: NURSE PRACTITIONER

## 2020-12-10 RX ORDER — NYSTATIN 100000 U/G
OINTMENT TOPICAL 2 TIMES DAILY
Qty: 30 G | Refills: 0 | Status: SHIPPED | OUTPATIENT
Start: 2020-12-10 | End: 2021-11-04 | Stop reason: SDUPTHER

## 2020-12-10 RX ORDER — FLUCONAZOLE 150 MG/1
TABLET ORAL
Qty: 3 TABLET | Refills: 0 | Status: SHIPPED | OUTPATIENT
Start: 2020-12-10 | End: 2020-12-17

## 2020-12-12 LAB
C TRACH DNA SPEC QL NAA+PROBE: NEGATIVE
N GONORRHOEA DNA SPEC QL NAA+PROBE: NEGATIVE

## 2020-12-13 LAB — T VAGINALIS RRNA SPEC QL NAA+PROBE: NEGATIVE

## 2020-12-14 ENCOUNTER — TELEPHONE (OUTPATIENT)
Dept: OBGYN CLINIC | Facility: CLINIC | Age: 45
End: 2020-12-14

## 2021-02-05 ENCOUNTER — ANNUAL EXAM (OUTPATIENT)
Dept: OBGYN CLINIC | Facility: CLINIC | Age: 46
End: 2021-02-05
Payer: COMMERCIAL

## 2021-02-05 VITALS — SYSTOLIC BLOOD PRESSURE: 134 MMHG | WEIGHT: 209 LBS | DIASTOLIC BLOOD PRESSURE: 78 MMHG | BODY MASS INDEX: 34.78 KG/M2

## 2021-02-05 DIAGNOSIS — Z12.31 SCREENING MAMMOGRAM, ENCOUNTER FOR: ICD-10-CM

## 2021-02-05 DIAGNOSIS — Z11.3 SCREENING FOR STD (SEXUALLY TRANSMITTED DISEASE): ICD-10-CM

## 2021-02-05 DIAGNOSIS — R92.8 ABNORMAL MAMMOGRAM: ICD-10-CM

## 2021-02-05 DIAGNOSIS — Z01.419 ENCOUNTER FOR GYNECOLOGICAL EXAMINATION WITHOUT ABNORMAL FINDING: Primary | ICD-10-CM

## 2021-02-05 PROCEDURE — 99396 PREV VISIT EST AGE 40-64: CPT | Performed by: OBSTETRICS & GYNECOLOGY

## 2021-02-05 PROCEDURE — 87491 CHLMYD TRACH DNA AMP PROBE: CPT | Performed by: OBSTETRICS & GYNECOLOGY

## 2021-02-05 PROCEDURE — 87591 N.GONORRHOEAE DNA AMP PROB: CPT | Performed by: OBSTETRICS & GYNECOLOGY

## 2021-02-05 NOTE — PATIENT INSTRUCTIONS
Menopause   WHAT YOU NEED TO KNOW:   What is menopause? Menopause is a normal stage in a woman's life when her monthly periods stop  A woman who has not had a period for a full year after the age of 36 is considered to be in menopause  Menopause usually occurs between ages 52 to 48  Perimenopause is a stage before menopause that may cause signs and symptoms similar to menopause  Perimenopause may start about 4 years before menopause  What causes menopause? Menopause starts when the ovaries stop making the female hormones estrogen and progesterone  After menopause, a woman is no longer able to become pregnant  Any of the following may trigger menopause or early menopause:  · Older age    · Surgery, including a hysterectomy or oophorectomy    · Family history of early menopause    · Smoking    · Chemotherapy or pelvic radiation    · Chromosome abnormalities, including De La Torre syndrome and Fragile X syndrome    What are the signs and symptoms of menopause? The signs and symptoms of menopause can be different from woman to woman:  · Irregular menstrual cycles with heavy vaginal bleeding followed by decreased bleeding until it stops    · Hot flashes (feeling warm, flushed, and sweaty)     · Vaginal changes such as increased dryness     · Mood changes such as anxiety, depression, or decreased desire to have sex    · Trouble sleeping, joint pain, headaches    · Brittle nails, hair on chin or chest where it is normally absent    · Decrease in breast size and change in skin texture    What do I need to know about menopause? · You can still get pregnant while you have periods  Continue to use birth control if you do not want to get pregnant  You may need to use birth control until it has been 1 year since your periods stopped  · Hormone replacement therapy (HRT) can be used to treat symptoms of menopause  HRT is medicine that replaces your low hormone levels  HRT contains estrogen and sometimes progestin   HRT has benefits and risks  HRT decreases your risk for bone fractures by helping to prevent osteoporosis  HRT also protects you from colon cancer  HRT may increase your risk for breast cancer, blood clots, heart disease, and stroke  Ask your healthcare provider if HRT is right for you  How can I care for myself? · Manage hot flashes  Hot flashes are brief periods of feeling very warm, flushed, and sweaty  Hot flashes can last from a few seconds to several minutes  They may happen many times during the day, and are common at night  Layer your clothing so that you can easily remove some clothing and cool yourself during a hot flash  Cold drinks may also be helpful  · Reduce vaginal dryness  by using over-the-counter vaginal creams  Vaginal dryness may cause you to have pain or discomfort during sex  Only use creams that are made for vaginal use  Do  not  use petroleum jelly  You may need an estrogen cream to put in and around your vagina  Estrogen cream may help decrease vaginal dryness and lower your risk of vaginal infections  · Continue to use birth control  during perimenopause if you do not want to get pregnant  You may need to use birth control until it has been 1 year since your periods stopped  Ask your healthcare provider when you can stop using birth control to prevent pregnancy  How can I live a healthy lifestyle during and after menopause? After menopause, your risk for heart disease and bone loss increases  Ask about these and other ways to stay healthy:  · Exercise regularly  Exercise helps you maintain a healthy weight  Exercise can also help to control your blood pressure and cholesterol levels  Include weight-bearing exercise for strong bones  Weight bearing exercise is recommended for at least 30 minutes, 3 times a week  Ask your healthcare provider about the best exercise plan for you  · Eat a variety of healthy foods    Include fruits, vegetables, whole grains (whole-wheat bread, pasta, and cereals), low-fat dairy, and lean protein foods (beans, poultry, and fish)  Limit foods high in sodium (salt)  Ask your healthcare provider for more information about a meal plan that is right for you  · Do not smoke  If you smoke, it is never too late to quit  You are more likely to have a heart attack, lung disease, blood clots, and cancer if you smoke  Ask your healthcare provider for information if you need help quitting  · Take supplements as directed  You may need extra calcium and vitamin D to help prevent osteoporosis  · Limit alcohol and caffeine  Alcohol and caffeine may worsen your symptoms  When should I contact my healthcare provider? · You have vaginal bleeding after menopause  · You have questions or concerns about your condition or care  CARE AGREEMENT:   You have the right to help plan your care  Learn about your health condition and how it may be treated  Discuss treatment options with your healthcare providers to decide what care you want to receive  You always have the right to refuse treatment  The above information is an  only  It is not intended as medical advice for individual conditions or treatments  Talk to your doctor, nurse or pharmacist before following any medical regimen to see if it is safe and effective for you  © Copyright [address] Information is for End User's use only and may not be sold, redistributed or otherwise used for commercial purposes   All illustrations and images included in CareNotes® are the copyrighted property of [employer]  or [address] [place] [address] [employer] [facility]

## 2021-02-05 NOTE — PROGRESS NOTES
Assessment/Plan:    Encounter for gynecological examination without abnormal finding  Pap/HPV current  GC collected  Mammogram - ordered 6 month diagnostic imaging study    Encourage healthy diet, exercise, Calcium 1200mg per day and at least 800 iu Vitamin D daily  Diagnoses and all orders for this visit:    Encounter for gynecological examination without abnormal finding    Screening mammogram, encounter for  -     Mammo screening bilateral w 3d & cad; Future    Abnormal mammogram  -     Cancel: Mammo diagnostic left w cad; Future  -     Mammo diagnostic left w cad; Future    Other orders  -     Cancel: Mammo diagnostic bilateral w 3d & cad; Future          Subjective:      Patient ID: Carmen Mg is a 41 y o  female  Patient presents for a routine annual visit  Age of first period-15 Y/O  Last Pap Smear- 18 Neg-HPV  LMP-21  Birth control-Tubal  Mammogram-20 (left breast bx 20- Left breast clip)    Non smoker  Social drinker  Currently sexually active  No concerns/questions for today's visit      Having frequent discharge - has been treated for BV and yeast in the past    clearish white  Some irritation  Menses are monthly - lasting longer than usual    Sexually active without issue      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[place] [id number]  The patient's primary symptoms include vaginal discharge  The patient's pertinent negatives include no genital itching, genital lesions, genital odor, genital rash, pelvic pain or vaginal bleeding  This is a recurrent problem  The current episode started more than 1 year ago  The problem occurs intermittently  The problem has been waxing and waning  The patient is experiencing no pain  Pertinent negatives include no chills, constipation, diarrhea, fever, frequency, nausea, painful intercourse, sore throat, urgency or vomiting  The vaginal discharge was thin and clear  She is sexually active   Her menstrual history has been regular  The following portions of the patient's history were reviewed and updated as appropriate:   She  has a past medical history of Migraine, Nasal turbinate hypertrophy, Tonsillitis, chronic, and Wears glasses  She   Patient Active Problem List    Diagnosis Date Noted    Subacute vaginitis 12/10/2020    S/P tonsillectomy 2020    Breast cancer screening 2020    Need for lipid screening 2020    Plantar fasciitis of left foot 2019    Nasal congestion 10/28/2019    Hypertrophy of both inferior nasal turbinates 10/28/2019    Other chronic diseases of tonsils and adenoids 10/28/2019    Encounter for gynecological examination without abnormal finding 12/10/2018    Fatigue 2018    Prediabetes 2018    Anemia 2017    Irritable bowel syndrome with constipation 10/03/2017     She  has a past surgical history that includes  section; Appendectomy; Tubal ligation; TONSILLECTOMY (Bilateral, 2019); Turbinate resection (N/A, 2019); and Mammo stereotactic breast biopsy left (all inc) (Left, 2020)  Her family history includes Breast cancer (age of onset: 39) in her maternal aunt; Cancer in her family; No Known Problems in her daughter, daughter, father, maternal aunt, maternal aunt, and mother  She  reports that she has never smoked  She has never used smokeless tobacco  She reports current alcohol use of about 1 0 standard drinks of alcohol per week  She reports that she does not use drugs    Current Outpatient Medications   Medication Sig Dispense Refill    clobetasol (TEMOVATE) 0 05 % cream Apply topically daily for 7 days 30 g 0    nystatin (MYCOSTATIN) ointment Apply topically 2 (two) times a day 30 g 0    estradiol (ESTRACE) 0 1 mg/g vaginal cream Insert 0 5 g into the vagina 2 (two) times a week (Patient not taking: Reported on 12/10/2020) 42 5 g 3     No current facility-administered medications for this visit  Current Outpatient Medications on File Prior to Visit   Medication Sig    clobetasol (TEMOVATE) 0 05 % cream Apply topically daily for 7 days    nystatin (MYCOSTATIN) ointment Apply topically 2 (two) times a day    estradiol (ESTRACE) 0 1 mg/g vaginal cream Insert 0 5 g into the vagina 2 (two) times a week (Patient not taking: Reported on 12/10/2020)     No current facility-administered medications on file prior to visit  She has No Known Allergies       Review of Systems   Constitutional: Negative for activity change, appetite change, chills, fatigue and fever  HENT: Negative for rhinorrhea, sneezing and sore throat  Eyes: Negative for visual disturbance  Respiratory: Negative for cough, shortness of breath and wheezing  Cardiovascular: Negative for chest pain, palpitations and leg swelling  Gastrointestinal: Negative for abdominal distention, constipation, diarrhea, nausea and vomiting  Genitourinary: Positive for vaginal discharge  Negative for difficulty urinating, frequency, pelvic pain and urgency  Neurological: Negative for syncope and light-headedness  Objective:      /78   Wt 94 8 kg (209 lb)   LMP 01/20/2021   BMI 34 78 kg/m²          Physical Exam  Chest:      Breasts: Breasts are symmetrical          Right: No inverted nipple, mass, nipple discharge, skin change or tenderness  Left: No inverted nipple, mass, nipple discharge, skin change or tenderness  Genitourinary:     Labia:         Right: No rash, tenderness, lesion or injury  Left: No rash, tenderness, lesion or injury  Vagina: Vaginal discharge (scant, white ) present  No tenderness or bleeding  Cervix: No cervical motion tenderness, discharge or friability  Uterus: Not deviated, not enlarged, not fixed and not tender  Adnexa:         Right: No mass, tenderness or fullness  Left: No mass, tenderness or fullness       Neurological:      Mental Status: She is alert and oriented to person, place, and time

## 2021-02-05 NOTE — ASSESSMENT & PLAN NOTE
Pap/HPV current  GC collected  Mammogram - ordered 6 month diagnostic imaging study    Encourage healthy diet, exercise, Calcium 1200mg per day and at least 800 iu Vitamin D daily

## 2021-02-06 LAB
C TRACH DNA SPEC QL NAA+PROBE: NEGATIVE
N GONORRHOEA DNA SPEC QL NAA+PROBE: NEGATIVE

## 2021-02-09 DIAGNOSIS — Z23 ENCOUNTER FOR IMMUNIZATION: ICD-10-CM

## 2021-02-23 ENCOUNTER — HOSPITAL ENCOUNTER (OUTPATIENT)
Dept: MAMMOGRAPHY | Facility: CLINIC | Age: 46
Discharge: HOME | End: 2021-02-23
Payer: COMMERCIAL

## 2021-02-23 VITALS — HEIGHT: 65 IN | WEIGHT: 215 LBS | BODY MASS INDEX: 35.82 KG/M2

## 2021-02-23 DIAGNOSIS — R92.8 ABNORMAL MAMMOGRAM: ICD-10-CM

## 2021-02-23 PROCEDURE — 77065 DX MAMMO INCL CAD UNI: CPT

## 2021-02-24 ENCOUNTER — TELEPHONE (OUTPATIENT)
Dept: OBGYN CLINIC | Facility: CLINIC | Age: 46
End: 2021-02-24

## 2021-02-24 DIAGNOSIS — R92.1 BREAST CALCIFICATIONS: Primary | ICD-10-CM

## 2021-02-24 NOTE — TELEPHONE ENCOUNTER
Spoke to pt and she is aware and I placed orders    ----- Message from Debbi Stock MD sent at 2/24/2021 12:06 PM EST -----  Please call Jaziel - mammogram reviewed   Plan for 5 month follow up diagnostic mammogram bilateral

## 2021-03-19 ENCOUNTER — TELEMEDICINE (OUTPATIENT)
Dept: FAMILY MEDICINE CLINIC | Facility: CLINIC | Age: 46
End: 2021-03-19
Payer: COMMERCIAL

## 2021-03-19 DIAGNOSIS — U07.1 COVID-19 VIRUS INFECTION: Primary | ICD-10-CM

## 2021-03-19 PROBLEM — R09.81 NASAL CONGESTION: Status: RESOLVED | Noted: 2019-10-28 | Resolved: 2021-03-19

## 2021-03-19 PROBLEM — R53.83 FATIGUE: Status: RESOLVED | Noted: 2018-04-17 | Resolved: 2021-03-19

## 2021-03-19 PROCEDURE — 99212 OFFICE O/P EST SF 10 MIN: CPT | Performed by: FAMILY MEDICINE

## 2021-03-19 NOTE — PROGRESS NOTES
COVID-19 Virtual Visit     Assessment/Plan:    Problem List Items Addressed This Visit        Other    COVID-19 virus infection - Primary         Disposition:     I recommended self-quarantine for 10 days and to watch for symptoms until 14 days after exposure  If patient were to develop symptoms, they should self isolate and call our office for further guidance  I have spent 3 minutes directly with the patient  Greater than 50% of this time was spent in counseling/coordination of care regarding: prognosis, instructions for management and impressions  Encounter provider Eren Carlos MD    Provider located at [place] [address] [address]  [address] [place] [id number]    Recent Visits  No visits were found meeting these conditions  Showing recent visits within past 7 days and meeting all other requirements     Today's Visits  Date Type Provider Dept   03/19/21 Telemedicine Eren Carlos MD [address] [address] today's visits and meeting all other requirements     Future Appointments  No visits were found meeting these conditions  Showing future appointments within next 150 days and meeting all other requirements        Patient agrees to participate in a virtual check in via telephone or video visit instead of presenting to the office to address urgent/immediate medical needs  Patient is aware this is a billable service  After connecting through Telephone, the patient was identified by name and date of birth  Neilyaritza Cm was informed that this was a telemedicine visit and that the exam was being conducted confidentially over secure lines  My office door was closed  No one else was in the room  Neilyaritza Toi acknowledged consent and understanding of privacy and security of the telemedicine visit  I informed the patient that I have reviewed her record in Epic and presented the opportunity for her to ask any questions regarding the visit today   The patient agreed to participate  It was my intent to perform this visit via video technology but the patient was not able to do a video connection so the visit was completed via audio telephone only  Subjective:   Ngoc Flores is a 41 y o  female who is concerned about COVID-19  Patient's symptoms include nasal congestion and headache  Patient denies fever, fatigue, cough, shortness of breath, nausea and diarrhea       Date of symptom onset: 3/19/2021    Exposure:   Contact with a person who is under investigation (PUI) for or who is positive for COVID-19 within the last 14 days?: Yes    Hospitalized recently for fever and/or lower respiratory symptoms?: No      Currently a healthcare worker that is involved in direct patient care?: No      Works in a special setting where the risk of COVID-19 transmission may be high? (this may include long-term care, correctional and MCC facilities; homeless shelters; assisted-living facilities and group homes ): No      Resident in a special setting where the risk of COVID-19 transmission may be high? (this may include long-term care, correctional and MCC facilities; homeless shelters; assisted-living facilities and group homes ): No      Lab Results   Component Value Date    SARSCOV2 Positive (A) 03/15/2021     Past Medical History:   Diagnosis Date    Migraine     Nasal turbinate hypertrophy     OR correction today 2019    Tonsillitis, chronic     Wears glasses      Past Surgical History:   Procedure Laterality Date    APPENDECTOMY      BREAST BIOPSY Left 2020    benign     SECTION      MAMMO STEREOTACTIC BREAST BIOPSY LEFT (ALL INC) Left 2020    TONSILLECTOMY Bilateral 2019    Procedure: TONSILLECTOMY;  Surgeon: Ramiro Callahan DO;  Location[facility];  Service: ENT    TUBAL LIGATION      TURBINATE RESECTION N/A 2019    Procedure: PARTIAL INFERIOR TURBINECTOMY WITH OUTFRACTURE;  Surgeon: Ramiro Callahan DO; Location: [facility] OR;  Service: ENT     Current Outpatient Medications   Medication Sig Dispense Refill    clobetasol (TEMOVATE) 0 05 % cream Apply topically daily for 7 days 30 g 0    estradiol (ESTRACE) 0 1 mg/g vaginal cream Insert 0 5 g into the vagina 2 (two) times a week (Patient not taking: Reported on 12/10/2020) 42 5 g 3    nystatin (MYCOSTATIN) ointment Apply topically 2 (two) times a day 30 g 0     No current facility-administered medications for this visit  No Known Allergies    Review of Systems   Constitutional: Negative for activity change, appetite change, fatigue and fever  HENT: Positive for congestion  Negative for ear discharge  Respiratory: Negative for cough and shortness of breath  Cardiovascular: Negative for chest pain and palpitations  Gastrointestinal: Negative for diarrhea and nausea  Musculoskeletal: Negative for arthralgias and back pain  Skin: Negative for color change and rash  Neurological: Positive for headaches  Negative for dizziness  Psychiatric/Behavioral: Negative for agitation and behavioral problems  Objective: There were no vitals filed for this visit  Physical Exam  Neurological:      Mental Status: She is alert and oriented to person, place, and time  VIRTUAL VISIT DISCLAIMER    Ngoc Flores acknowledges that she has consented to an online visit or consultation  She understands that the online visit is based solely on information provided by her, and that, in the absence of a face-to-face physical evaluation by the physician, the diagnosis she receives is both limited and provisional in terms of accuracy and completeness  This is not intended to replace a full medical face-to-face evaluation by the physician  Ngoc Flores understands and accepts these terms

## 2021-03-22 ENCOUNTER — TELEMEDICINE (OUTPATIENT)
Dept: FAMILY MEDICINE CLINIC | Facility: CLINIC | Age: 46
End: 2021-03-22
Payer: COMMERCIAL

## 2021-03-22 DIAGNOSIS — U07.1 COVID-19 VIRUS INFECTION: Primary | ICD-10-CM

## 2021-03-22 PROCEDURE — 99213 OFFICE O/P EST LOW 20 MIN: CPT | Performed by: FAMILY MEDICINE

## 2021-03-22 RX ORDER — BUTALBITAL, ACETAMINOPHEN AND CAFFEINE 50; 325; 40 MG/1; MG/1; MG/1
1 TABLET ORAL EVERY 4 HOURS PRN
Qty: 30 TABLET | Refills: 0 | Status: SHIPPED | OUTPATIENT
Start: 2021-03-22 | End: 2021-07-30 | Stop reason: ALTCHOICE

## 2021-03-22 NOTE — PROGRESS NOTES
COVID-19 Virtual Visit     Assessment/Plan:    Problem List Items Addressed This Visit        Other    COVID-19 virus infection - Primary         Disposition:     I recommended continued isolation until at least 24 hours have passed since recovery defined as resolution of fever without the use of fever-reducing medications AND improvement in COVID symptoms AND 10 days have passed since onset of symptoms (or 10 days have passed since date of first positive viral diagnostic test for asymptomatic patients)  I have spent 3 minutes directly with the patient  Greater than 50% of this time was spent in counseling/coordination of care regarding: prognosis, instructions for management and impressions  Encounter provider Svetlana Keane MD    Provider located at [place] [address]  [address] [place] [phone]    Recent Visits  Date Type Provider Dept   03/19/21 Telemedicine Svetlana Keane MD [address] [address] recent visits within past 7 days and meeting all other requirements     Today's Visits  Date Type Provider Dept   03/22/21 Telemedicine Svetlana Keane MD [facility] [address] today's visits and meeting all other requirements     Future Appointments  No visits were found meeting these conditions  Showing future appointments within next 150 days and meeting all other requirements        Patient agrees to participate in a virtual check in via telephone or video visit instead of presenting to the office to address urgent/immediate medical needs  Patient is aware this is a billable service  After connecting through Telephone, the patient was identified by name and date of birth  Dorinda Esteves was informed that this was a telemedicine visit and that the exam was being conducted confidentially over secure lines  My office door was closed  No one else was in the room   Dorinda Esteves acknowledged consent and understanding of privacy and security of the telemedicine visit  I informed the patient that I have reviewed her record in Epic and presented the opportunity for her to ask any questions regarding the visit today  The patient agreed to participate  It was my intent to perform this visit via video technology but the patient was not able to do a video connection so the visit was completed via audio telephone only  Subjective:   Stanton Sims is a 41 y o  female who has been screened for COVID-19  Symptom change since last report: improving  Patient's symptoms include headache  Patient denies fever, fatigue, congestion, cough, shortness of breath, nausea and diarrhea  Maria Dolores Campbell has been staying home and has isolated themselves in her home  She is taking care to not share personal items and is cleaning all surfaces that are touched often, like counters, tabletops, and doorknobs using household cleaning sprays or wipes  She is wearing a mask when she leaves her room       Date of symptom onset: 3/12/2021  Date of positive COVID-19 PCR: 3/15/2021    Lab Results   Component Value Date    SARSCOV2 Positive (A) 03/15/2021     Past Medical History:   Diagnosis Date    Migraine     Nasal turbinate hypertrophy     OR correction today 2019    Tonsillitis, chronic     Wears glasses      Past Surgical History:   Procedure Laterality Date    APPENDECTOMY      BREAST BIOPSY Left 2020    benign     SECTION      MAMMO STEREOTACTIC BREAST BIOPSY LEFT (ALL INC) Left 2020    TONSILLECTOMY Bilateral 2019    Procedure: TONSILLECTOMY;  Surgeon: Payton Davis DO;  [facility];  Service: ENT    TUBAL LIGATION      TURBINATE RESECTION N/A 2019    Procedure: PARTIAL INFERIOR TURBINECTOMY WITH OUTFRACTURE;  Surgeon: Payton Davis DO;  Location[facility];  Service: ENT     Current Outpatient Medications   Medication Sig Dispense Refill    clobetasol (TEMOVATE) 0 05 % cream Apply topically daily for 7 days 30 g 0  estradiol (ESTRACE) 0 1 mg/g vaginal cream Insert 0 5 g into the vagina 2 (two) times a week (Patient not taking: Reported on 12/10/2020) 42 5 g 3    nystatin (MYCOSTATIN) ointment Apply topically 2 (two) times a day 30 g 0     No current facility-administered medications for this visit  No Known Allergies    Review of Systems   Constitutional: Negative for activity change, appetite change, fatigue and fever  HENT: Negative for congestion and ear discharge  Respiratory: Negative for cough and shortness of breath  Cardiovascular: Negative for chest pain and palpitations  Gastrointestinal: Negative for diarrhea and nausea  Musculoskeletal: Negative for arthralgias and back pain  Skin: Negative for color change and rash  Neurological: Positive for headaches  Negative for dizziness  Psychiatric/Behavioral: Negative for agitation and behavioral problems  Objective: There were no vitals filed for this visit  Physical Exam  Neurological:      Mental Status: She is alert and oriented to person, place, and time  VIRTUAL VISIT DISCLAIMER    Jacek Zaldivar acknowledges that she has consented to an online visit or consultation  She understands that the online visit is based solely on information provided by her, and that, in the absence of a face-to-face physical evaluation by the physician, the diagnosis she receives is both limited and provisional in terms of accuracy and completeness  This is not intended to replace a full medical face-to-face evaluation by the physician  Jacek Zaldivar understands and accepts these terms

## 2021-07-02 ENCOUNTER — HOSPITAL ENCOUNTER (OUTPATIENT)
Dept: MAMMOGRAPHY | Facility: CLINIC | Age: 46
Discharge: HOME | End: 2021-07-02
Payer: COMMERCIAL

## 2021-07-02 VITALS — HEIGHT: 65 IN | WEIGHT: 208 LBS | BODY MASS INDEX: 34.66 KG/M2

## 2021-07-02 DIAGNOSIS — R92.1 BREAST CALCIFICATIONS: ICD-10-CM

## 2021-07-02 DIAGNOSIS — Z12.31 SCREENING MAMMOGRAM, ENCOUNTER FOR: ICD-10-CM

## 2021-07-02 PROCEDURE — G0279 TOMOSYNTHESIS, MAMMO: HCPCS

## 2021-07-02 PROCEDURE — 77066 DX MAMMO INCL CAD BI: CPT

## 2021-07-30 ENCOUNTER — OFFICE VISIT (OUTPATIENT)
Dept: OBGYN CLINIC | Facility: CLINIC | Age: 46
End: 2021-07-30
Payer: COMMERCIAL

## 2021-07-30 VITALS
DIASTOLIC BLOOD PRESSURE: 84 MMHG | WEIGHT: 206.8 LBS | HEIGHT: 65 IN | SYSTOLIC BLOOD PRESSURE: 116 MMHG | BODY MASS INDEX: 34.45 KG/M2

## 2021-07-30 DIAGNOSIS — Z11.3 SCREENING FOR STD (SEXUALLY TRANSMITTED DISEASE): Primary | ICD-10-CM

## 2021-07-30 DIAGNOSIS — N76.0 VULVOVAGINITIS: ICD-10-CM

## 2021-07-30 DIAGNOSIS — N94.10 FEMALE DYSPAREUNIA: ICD-10-CM

## 2021-07-30 DIAGNOSIS — N39.0 URINARY TRACT INFECTION WITHOUT HEMATURIA, SITE UNSPECIFIED: ICD-10-CM

## 2021-07-30 PROCEDURE — 3008F BODY MASS INDEX DOCD: CPT | Performed by: NURSE PRACTITIONER

## 2021-07-30 PROCEDURE — 87086 URINE CULTURE/COLONY COUNT: CPT | Performed by: NURSE PRACTITIONER

## 2021-07-30 PROCEDURE — 87591 N.GONORRHOEAE DNA AMP PROB: CPT | Performed by: NURSE PRACTITIONER

## 2021-07-30 PROCEDURE — 99213 OFFICE O/P EST LOW 20 MIN: CPT | Performed by: NURSE PRACTITIONER

## 2021-07-30 PROCEDURE — 1036F TOBACCO NON-USER: CPT | Performed by: NURSE PRACTITIONER

## 2021-07-30 PROCEDURE — 87491 CHLMYD TRACH DNA AMP PROBE: CPT | Performed by: NURSE PRACTITIONER

## 2021-07-30 NOTE — PATIENT INSTRUCTIONS
Vaginal Atrophy   WHAT YOU NEED TO KNOW:   What is vaginal atrophy? Vaginal atrophy is a condition that causes thinning, drying, and inflammation of vaginal tissue  This condition is caused by decreased levels of estrogen (a female sex hormone)  Vaginal atrophy can increase your risk for vaginal and urinary tract infections  Vaginal atrophy can worsen over time if not treated  What causes or increases your risk of vaginal atrophy? · Menopause     · Medicines that lower your estrogen levels, such as those used to treat breast cancer, endometriosis, or fibroids    · Radiation to your pelvic area     · Surgery to remove the ovaries    · Breastfeeding    What are the signs and symptoms of vaginal atrophy? · Vaginal dryness, itching, and burning    · Vaginal discharge    · Pain or discomfort during sex    · Light bleeding after sex    · Burning during urination    · Frequent, sudden, strong urges to urinate    · Urinary incontinence (loss of control of your bladder)    How is vaginal atrophy diagnosed? Your healthcare provider will ask about your symptoms  A pelvic exam will be done to examine your vagina and cervix  Your healthcare provider will place a speculum into your vagina to open and examine it  A sample of discharge from your vagina may be collected and tested  A urine test may also be done  How is vaginal atrophy treated? · Over-the counter vaginal moisturizers  can help reduce dryness  Your healthcare provider may recommend that you use a vaginal moisturizer several times each week and during sex  Only use creams that are made for vaginal use  Do  not  use petroleum jelly  Lubricants can be used during sex to decrease pain and discomfort  · Estrogen  may help decrease dryness  It may also lower your risk of vaginal infections if you are going through menopause  It can also help to relieve urinary symptoms  Estrogen may be prescribed in the form of a cream, tablet, or ring   These medicines can be applied or inserted into the vagina  Estrogen can also be prescribed in the form of a pill  When should I contact my healthcare provider? · You have a foul-smelling odor coming from your vagina  · You have a thick, cheese-like discharge from your vagina  · You have itching, swelling, or redness in your vagina  · You have pain or burning when you urinate  · Your urine smells bad  · Your symptoms do not improve, or they get worse  · You have questions or concerns about your condition or care  CARE AGREEMENT:   You have the right to help plan your care  Learn about your health condition and how it may be treated  Discuss treatment options with your healthcare providers to decide what care you want to receive  You always have the right to refuse treatment  The above information is an  only  It is not intended as medical advice for individual conditions or treatments  Talk to your doctor, nurse or pharmacist before following any medical regimen to see if it is safe and effective for you  © Copyright [employer] 2021 Information is for End User's use only and may not be sold, redistributed or otherwise used for commercial purposes   All illustrations and images included in CareNotes® are the copyrighted property of [employer]  or [address] [place] [address] [employer]

## 2021-07-30 NOTE — PROGRESS NOTES
Diagnoses and all orders for this visit:    1  Screening for STD (sexually transmitted disease)  -     Chlamydia/GC amplified DNA by PCR    2  Vulvovaginitis  Restrict use of scented soaps, lotions  Recommended unscented [place] or oatmeal soap for thickened, dry skin  3  Female dyspareunia  Encouraged to start estrace as instructed by JOHN  Reviewed the benefits of its used  Will recheck how she feels in a F/U appt in 3 months  4  Urinary tract infection without hematuria, site unspecified  -     Urine culture    Will treat further based on results  Encouraged to increase hydration  All questions and concerns answered  Call with any questions  Pleasant 41 y o  female presents today with c/o vulvar itching and irritation, denies discharge and odor  She is requesting STD testing, but has a hx of dyspareunia and vulvar irritation and was previously TT'Z estrace as treatment and it was never started it  Concerned about cancer risks  She occasionally uses some scented soaps  Denies F/C  Vitals:    21 1033   BP: 116/84   BP Location: Right arm   Patient Position: Sitting   Weight: 93 8 kg (206 lb 12 8 oz)   Height: 5' 5" (1 651 m)     Body mass index is 34 41 kg/m²      No Known Allergies    Past Medical History:   Diagnosis Date    Migraine     Nasal turbinate hypertrophy     OR correction today 2019    Tonsillitis, chronic     Wears glasses      Past Surgical History:   Procedure Laterality Date    APPENDECTOMY      BREAST BIOPSY Left 2020    benign     SECTION      MAMMO STEREOTACTIC BREAST BIOPSY LEFT (ALL INC) Left 2020    TONSILLECTOMY Bilateral 2019    Procedure: TONSILLECTOMY;  Surgeon: Ankita Weaver DO;  Location: [facility];  Service: ENT    TUBAL LIGATION      TURBINATE RESECTION N/A 2019    Procedure: PARTIAL INFERIOR TURBINECTOMY WITH OUTFRACTURE;  Surgeon: Ankita Weaver DO;  Location: [facility];  Service: ENT     Family History   Problem Relation Age of Onset    Cancer Family     No Known Problems Mother     No Known Problems Father     No Known Problems Daughter     No Known Problems Daughter     No Known Problems Maternal Aunt     Breast cancer Maternal Aunt 39    No Known Problems Maternal Aunt     No Known Problems Maternal Grandmother     No Known Problems Paternal Grandmother      Social History     Tobacco Use    Smoking status: Never Smoker    Smokeless tobacco: Never Used   Vaping Use    Vaping Use: Never used   Substance Use Topics    Alcohol use: Yes     Alcohol/week: 1 0 standard drinks     Types: 1 Glasses of wine per week     Comment: Social     Drug use: No       Current Outpatient Medications:     clobetasol (TEMOVATE) 0 05 % cream, Apply topically daily for 7 days, Disp: 30 g, Rfl: 0    nystatin (MYCOSTATIN) ointment, Apply topically 2 (two) times a day, Disp: 30 g, Rfl: 0    estradiol (ESTRACE) 0 1 mg/g vaginal cream, Insert 0 5 g into the vagina 2 (two) times a week (Patient not taking: Reported on 12/10/2020), Disp: 42 5 g, Rfl: 3    OB History    Para Term  AB Living   4 3 3     3   SAB TAB Ectopic Multiple Live Births           3      # Outcome Date GA Lbr Manuel/2nd Weight Sex Delivery Anes PTL Lv   4             3 Term            2 Term            1 Term                Review of Systems     Review of Systems   Constitutional: Negative for chills, fatigue, fever and unexpected weight change  Respiratory: Negative for shortness of breath  Gastrointestinal: Negative for anal bleeding, blood in stool, constipation and diarrhea  Genitourinary: Negative for difficulty urinating, dysuria and hematuria  OBGyn Exam     Physical Exam   Constitutional: She appears well-developed and well-nourished  No distress  Head: Normocephalic  Neck: Normal range of motion  Neck supple     Pulmonary: Effort normal  Lung sounds clear  Breasts: bilateral without masses, skin changes or nipple discharge  Bilaterally soft and warm to touch  No areas of erythema or pain  Abdominal: Soft  Non-tender  Pelvic exam was performed with patient supine  No labial fusion, thinning or leukoplakia  THERE IS THICKENING OF PERINEAL GROIN AND LABIAL TISSUE  Uterus is not deviated, not enlarged, not fixed and not tender  Cervix exhibits no motion tenderness, no discharge and no friability  Right adnexum displays no mass, no tenderness and no fullness  Left adnexum displays no mass, no tenderness and no fullness  No erythema, ++ tenderness in the vagina, R/T vaginal dryness  No signs of atrophy, erosion, shortening and/or tightening of vaginal canal No foreign body in the vagina  No signs of injury around the vagina  No vaginal discharge found  Prolapse: None    Lymphadenopathy:          Right: No inguinal adenopathy present  Left: No inguinal adenopathy present

## 2021-07-31 LAB — BACTERIA UR CULT: NORMAL

## 2021-08-03 LAB
C TRACH DNA SPEC QL NAA+PROBE: NEGATIVE
N GONORRHOEA DNA SPEC QL NAA+PROBE: NEGATIVE

## 2021-08-16 ENCOUNTER — TELEPHONE (OUTPATIENT)
Dept: OBGYN CLINIC | Facility: CLINIC | Age: 46
End: 2021-08-16

## 2021-08-16 NOTE — TELEPHONE ENCOUNTER
Vincenzo Gaytan,    I called and spoke to pt  She said she wanted to discuss possibly getting a Mirena for cycle regulation, flow, hormones, etc  But if you do not recommend that, she would want the estrace refill- said she threw away her old one  I did schedule an appt with you to discuss Mirena on 9/1  Please send back to advise if she should continue estrace or just have appt for mirena, pt said she didn't want the estrace if you ok'd her to have appt

## 2021-08-16 NOTE — TELEPHONE ENCOUNTER
Pt saw Birdie Ruano 7/30,  Pt has questions regarding estrace & MIRENA,  pls call to discuss,  Thanks

## 2021-08-16 NOTE — TELEPHONE ENCOUNTER
I sent my mirena brochure to pt as she had many questions  I answered many questions on phone   Pt to pre medicate with advil

## 2021-08-16 NOTE — TELEPHONE ENCOUNTER
I am ok with Mirena IUD insertion, so we can plan for that insertion on 9/1  Pt should just premedicate with ibuprofen 600mg before coming in  Thanks!

## 2021-08-24 ENCOUNTER — IMMUNIZATIONS (OUTPATIENT)
Dept: FAMILY MEDICINE CLINIC | Facility: HOSPITAL | Age: 46
End: 2021-08-24

## 2021-08-24 DIAGNOSIS — Z23 ENCOUNTER FOR IMMUNIZATION: Primary | ICD-10-CM

## 2021-08-24 PROCEDURE — 91300 SARS-COV-2 / COVID-19 MRNA VACCINE (PFIZER-BIONTECH) 30 MCG: CPT

## 2021-08-24 PROCEDURE — 0001A SARS-COV-2 / COVID-19 MRNA VACCINE (PFIZER-BIONTECH) 30 MCG: CPT

## 2021-09-14 ENCOUNTER — IMMUNIZATIONS (OUTPATIENT)
Dept: FAMILY MEDICINE CLINIC | Facility: HOSPITAL | Age: 46
End: 2021-09-14

## 2021-09-14 DIAGNOSIS — Z23 ENCOUNTER FOR IMMUNIZATION: Primary | ICD-10-CM

## 2021-09-14 PROCEDURE — 0002A SARS-COV-2 / COVID-19 MRNA VACCINE (PFIZER-BIONTECH) 30 MCG: CPT

## 2021-09-14 PROCEDURE — 91300 SARS-COV-2 / COVID-19 MRNA VACCINE (PFIZER-BIONTECH) 30 MCG: CPT

## 2021-10-28 ENCOUNTER — OFFICE VISIT (OUTPATIENT)
Dept: OBGYN CLINIC | Facility: CLINIC | Age: 46
End: 2021-10-28
Payer: COMMERCIAL

## 2021-10-28 VITALS
SYSTOLIC BLOOD PRESSURE: 124 MMHG | DIASTOLIC BLOOD PRESSURE: 84 MMHG | HEIGHT: 65 IN | WEIGHT: 214 LBS | BODY MASS INDEX: 35.65 KG/M2

## 2021-10-28 DIAGNOSIS — N76.0 VULVOVAGINITIS: Primary | ICD-10-CM

## 2021-10-28 DIAGNOSIS — Z11.3 SCREEN FOR STD (SEXUALLY TRANSMITTED DISEASE): ICD-10-CM

## 2021-10-28 PROCEDURE — 99213 OFFICE O/P EST LOW 20 MIN: CPT | Performed by: NURSE PRACTITIONER

## 2021-10-28 PROCEDURE — 87491 CHLMYD TRACH DNA AMP PROBE: CPT | Performed by: NURSE PRACTITIONER

## 2021-10-28 PROCEDURE — 87591 N.GONORRHOEAE DNA AMP PROB: CPT | Performed by: NURSE PRACTITIONER

## 2021-10-28 PROCEDURE — 81514 NFCT DS BV&VAGINITIS DNA ALG: CPT | Performed by: NURSE PRACTITIONER

## 2021-10-28 RX ORDER — METRONIDAZOLE 500 MG/1
500 TABLET ORAL EVERY 12 HOURS SCHEDULED
Qty: 14 TABLET | Refills: 0 | Status: SHIPPED | OUTPATIENT
Start: 2021-10-28 | End: 2021-11-04

## 2021-10-28 RX ORDER — FLUCONAZOLE 150 MG/1
150 TABLET ORAL WEEKLY
Qty: 12 TABLET | Refills: 0 | Status: SHIPPED | OUTPATIENT
Start: 2021-10-28 | End: 2022-01-14

## 2021-10-29 ENCOUNTER — APPOINTMENT (OUTPATIENT)
Dept: LAB | Facility: HOSPITAL | Age: 46
End: 2021-10-29
Payer: COMMERCIAL

## 2021-10-29 DIAGNOSIS — N76.0 VULVOVAGINITIS: ICD-10-CM

## 2021-10-29 DIAGNOSIS — Z11.3 SCREEN FOR STD (SEXUALLY TRANSMITTED DISEASE): ICD-10-CM

## 2021-10-29 LAB
C GLABRATA DNA VAG QL NAA+PROBE: NEGATIVE
C KRUSEI DNA VAG QL NAA+PROBE: NEGATIVE
CANDIDA SP 6 PNL VAG NAA+PROBE: NEGATIVE
EST. AVERAGE GLUCOSE BLD GHB EST-MCNC: 126 MG/DL
HBA1C MFR BLD: 6 %
HBV SURFACE AG SER QL: NORMAL
HCV AB SER QL: NORMAL
RPR SER QL: NORMAL
T VAGINALIS DNA VAG QL NAA+PROBE: NEGATIVE
VAGINOSIS/ITIS DNA PNL VAG PROBE+SIG AMP: POSITIVE

## 2021-10-29 PROCEDURE — 83036 HEMOGLOBIN GLYCOSYLATED A1C: CPT

## 2021-10-29 PROCEDURE — 87340 HEPATITIS B SURFACE AG IA: CPT

## 2021-10-29 PROCEDURE — 87389 HIV-1 AG W/HIV-1&-2 AB AG IA: CPT

## 2021-10-29 PROCEDURE — 36415 COLL VENOUS BLD VENIPUNCTURE: CPT

## 2021-10-29 PROCEDURE — 86803 HEPATITIS C AB TEST: CPT

## 2021-10-29 PROCEDURE — 86592 SYPHILIS TEST NON-TREP QUAL: CPT

## 2021-10-30 LAB
C TRACH DNA SPEC QL NAA+PROBE: NEGATIVE
N GONORRHOEA DNA SPEC QL NAA+PROBE: NEGATIVE

## 2021-10-31 LAB — HIV 1+2 AB+HIV1 P24 AG SERPL QL IA: NORMAL

## 2021-11-02 ENCOUNTER — TELEPHONE (OUTPATIENT)
Dept: OBGYN CLINIC | Facility: CLINIC | Age: 46
End: 2021-11-02

## 2021-11-04 DIAGNOSIS — N76.1 SUBACUTE VAGINITIS: ICD-10-CM

## 2021-11-04 RX ORDER — NYSTATIN 100000 U/G
OINTMENT TOPICAL 2 TIMES DAILY
Qty: 30 G | Refills: 0 | Status: SHIPPED | OUTPATIENT
Start: 2021-11-04

## 2021-11-17 ENCOUNTER — OFFICE VISIT (OUTPATIENT)
Dept: FAMILY MEDICINE CLINIC | Facility: CLINIC | Age: 46
End: 2021-11-17
Payer: COMMERCIAL

## 2021-11-17 VITALS
BODY MASS INDEX: 34.99 KG/M2 | TEMPERATURE: 97.3 F | HEIGHT: 65 IN | DIASTOLIC BLOOD PRESSURE: 76 MMHG | HEART RATE: 73 BPM | SYSTOLIC BLOOD PRESSURE: 117 MMHG | OXYGEN SATURATION: 99 % | WEIGHT: 210 LBS

## 2021-11-17 DIAGNOSIS — R73.01 IMPAIRED FASTING GLUCOSE: ICD-10-CM

## 2021-11-17 DIAGNOSIS — K64.4 HEMORRHOIDS, EXTERNAL: ICD-10-CM

## 2021-11-17 DIAGNOSIS — M54.50 ACUTE MIDLINE LOW BACK PAIN WITHOUT SCIATICA: Primary | ICD-10-CM

## 2021-11-17 DIAGNOSIS — K59.00 CONSTIPATION, UNSPECIFIED CONSTIPATION TYPE: ICD-10-CM

## 2021-11-17 PROCEDURE — 99214 OFFICE O/P EST MOD 30 MIN: CPT | Performed by: FAMILY MEDICINE

## 2021-11-17 PROCEDURE — 96372 THER/PROPH/DIAG INJ SC/IM: CPT | Performed by: FAMILY MEDICINE

## 2021-11-17 PROCEDURE — 1036F TOBACCO NON-USER: CPT | Performed by: FAMILY MEDICINE

## 2021-11-17 PROCEDURE — 3008F BODY MASS INDEX DOCD: CPT | Performed by: FAMILY MEDICINE

## 2021-11-17 RX ORDER — IBUPROFEN 800 MG/1
800 TABLET ORAL EVERY 8 HOURS PRN
Qty: 60 TABLET | Refills: 2 | Status: SHIPPED | OUTPATIENT
Start: 2021-11-17 | End: 2022-02-21

## 2021-11-17 RX ORDER — POLYETHYLENE GLYCOL 3350 17 G/17G
17 POWDER, FOR SOLUTION ORAL DAILY
Qty: 578 G | Refills: 3 | Status: SHIPPED | OUTPATIENT
Start: 2021-11-17

## 2021-11-17 RX ORDER — KETOROLAC TROMETHAMINE 30 MG/ML
30 INJECTION, SOLUTION INTRAMUSCULAR; INTRAVENOUS ONCE
Status: COMPLETED | OUTPATIENT
Start: 2021-11-17 | End: 2021-11-17

## 2021-11-17 RX ORDER — WHEAT DEXTRIN/ASPARTAME 3 G/6 G
1 POWDER IN PACKET (EA) ORAL DAILY
Qty: 28 EACH | Refills: 3 | Status: SHIPPED | OUTPATIENT
Start: 2021-11-17

## 2021-11-17 RX ADMIN — KETOROLAC TROMETHAMINE 30 MG: 30 INJECTION, SOLUTION INTRAMUSCULAR; INTRAVENOUS at 13:44

## 2021-11-18 ENCOUNTER — TELEPHONE (OUTPATIENT)
Dept: OBGYN CLINIC | Facility: CLINIC | Age: 46
End: 2021-11-18

## 2021-12-13 ENCOUNTER — TELEPHONE (OUTPATIENT)
Dept: OBGYN CLINIC | Facility: CLINIC | Age: 46
End: 2021-12-13

## 2021-12-13 DIAGNOSIS — N76.0 ACUTE VAGINITIS: Primary | ICD-10-CM

## 2021-12-13 RX ORDER — FLUCONAZOLE 150 MG/1
TABLET ORAL
Qty: 2 TABLET | Refills: 0 | Status: SHIPPED | OUTPATIENT
Start: 2021-12-13 | End: 2021-12-17

## 2021-12-14 ENCOUNTER — TELEPHONE (OUTPATIENT)
Dept: FAMILY MEDICINE CLINIC | Facility: CLINIC | Age: 46
End: 2021-12-14

## 2022-02-14 ENCOUNTER — ANNUAL EXAM (OUTPATIENT)
Dept: OBGYN CLINIC | Facility: CLINIC | Age: 47
End: 2022-02-14
Payer: COMMERCIAL

## 2022-02-14 VITALS — WEIGHT: 211 LBS | DIASTOLIC BLOOD PRESSURE: 80 MMHG | BODY MASS INDEX: 35.11 KG/M2 | SYSTOLIC BLOOD PRESSURE: 122 MMHG

## 2022-02-14 DIAGNOSIS — Z12.31 SCREENING MAMMOGRAM, ENCOUNTER FOR: ICD-10-CM

## 2022-02-14 DIAGNOSIS — Z01.419 ENCOUNTER FOR ANNUAL ROUTINE GYNECOLOGICAL EXAMINATION: Primary | ICD-10-CM

## 2022-02-14 DIAGNOSIS — Z01.419 ENCOUNTER FOR GYNECOLOGICAL EXAMINATION WITHOUT ABNORMAL FINDING: ICD-10-CM

## 2022-02-14 PROBLEM — N39.0 URINARY TRACT INFECTION WITHOUT HEMATURIA: Status: RESOLVED | Noted: 2021-07-30 | Resolved: 2022-02-14

## 2022-02-14 PROBLEM — Z12.39 BREAST CANCER SCREENING: Status: RESOLVED | Noted: 2020-06-24 | Resolved: 2022-02-14

## 2022-02-14 PROBLEM — N94.10 FEMALE DYSPAREUNIA: Status: RESOLVED | Noted: 2021-07-30 | Resolved: 2022-02-14

## 2022-02-14 PROBLEM — Z11.3 SCREENING FOR STD (SEXUALLY TRANSMITTED DISEASE): Status: RESOLVED | Noted: 2021-07-30 | Resolved: 2022-02-14

## 2022-02-14 PROBLEM — Z11.3 SCREEN FOR STD (SEXUALLY TRANSMITTED DISEASE): Status: RESOLVED | Noted: 2021-10-28 | Resolved: 2022-02-14

## 2022-02-14 PROBLEM — N76.0 VULVOVAGINITIS: Status: RESOLVED | Noted: 2021-07-30 | Resolved: 2022-02-14

## 2022-02-14 PROBLEM — Z13.220 NEED FOR LIPID SCREENING: Status: RESOLVED | Noted: 2020-06-24 | Resolved: 2022-02-14

## 2022-02-14 PROBLEM — N76.1 SUBACUTE VAGINITIS: Status: RESOLVED | Noted: 2020-12-10 | Resolved: 2022-02-14

## 2022-02-14 PROCEDURE — G0476 HPV COMBO ASSAY CA SCREEN: HCPCS | Performed by: OBSTETRICS & GYNECOLOGY

## 2022-02-14 PROCEDURE — S0612 ANNUAL GYNECOLOGICAL EXAMINA: HCPCS | Performed by: OBSTETRICS & GYNECOLOGY

## 2022-02-14 PROCEDURE — G0145 SCR C/V CYTO,THINLAYER,RESCR: HCPCS | Performed by: OBSTETRICS & GYNECOLOGY

## 2022-02-14 NOTE — PROGRESS NOTES
Assessment/Plan:    Encounter for gynecological examination without abnormal finding  Pap/HPV collected  Mammogram ordered    Encourage healthy diet, exercise, Calcium 1200mg per day and at least 800 iu Vitamin D daily  Vulvar irritation - atrophy most likely cause  Has just started estrogen cream          Diagnoses and all orders for this visit:    Encounter for annual routine gynecological examination  -     Liquid-based pap, screening    Screening mammogram, encounter for  -     Mammo diagnostic right w 3d & cad; Future    Encounter for gynecological examination without abnormal finding          Subjective:      Patient ID: Geno Villagran is a 40 y o  female  Patient presents for a routine annual visit  Menarche- 13Y/O  Last Pap Smear- 12/18/17 Neg-HPV  LMP-2/11  Birth control-Tubal  Mammogram-7/2/21 Calcification ( Right breast screen//Left breast U/s schedule 2/17)  [id number]  Non smoker  Social drinker  Currently sexually active  Family history of breast cancer  No concerns/questions for today's visit    Now   Own apartment  Daughter will be moving in with her now  17   Very stressful time due to daughter's depression over the last 2 years  Was in a relationship- now single  Gynecologic Exam  The patient's primary symptoms include genital itching  The patient's pertinent negatives include no genital lesions, genital odor, genital rash, pelvic pain, vaginal bleeding or vaginal discharge  This is a recurrent problem  The current episode started more than 1 year ago  The problem occurs intermittently  The problem has been waxing and waning  The patient is experiencing no pain  Associated symptoms include painful intercourse  Pertinent negatives include no chills, constipation, diarrhea, dysuria, fever, headaches, nausea, sore throat, urgency or vomiting  She is sexually active  Her menstrual history has been regular         The following portions of the patient's history were reviewed and updated as appropriate:   She  has a past medical history of Migraine, Nasal turbinate hypertrophy, Tonsillitis, chronic, and Wears glasses  She   Patient Active Problem List    Diagnosis Date Noted    Acute midline low back pain without sciatica 2021    Body mass index (BMI) of 34 0-34 9 in adult 2021    Impaired fasting glucose 2021    Hemorrhoids, external 2021    COVID-19 virus infection 2021    S/P tonsillectomy 2020    Plantar fasciitis of left foot 2019    Hypertrophy of both inferior nasal turbinates 10/28/2019    Other chronic diseases of tonsils and adenoids 10/28/2019    Encounter for gynecological examination without abnormal finding 12/10/2018    Prediabetes 2018    Anemia 2017    Constipation 10/03/2017    Irritable bowel syndrome with constipation 10/03/2017     She  has a past surgical history that includes  section; Appendectomy; Tubal ligation; TONSILLECTOMY (Bilateral, 2019); Turbinate resection (N/A, 2019); Mammo stereotactic breast biopsy left (all inc) (Left, 2020); and Breast biopsy (Left, 2020)  Her family history includes Breast cancer (age of onset: 39) in her maternal aunt; Cancer in her family; No Known Problems in her daughter, daughter, father, maternal aunt, maternal aunt, maternal grandmother, mother, and paternal grandmother  She  reports that she has never smoked  She has never used smokeless tobacco  She reports current alcohol use of about 1 0 standard drink of alcohol per week  She reports that she does not use drugs    Current Outpatient Medications   Medication Sig Dispense Refill    estradiol (ESTRACE) 0 1 mg/g vaginal cream Insert 0 5 g into the vagina 2 (two) times a week (Patient not taking: Reported on 12/10/2020) 42 5 g 3    ibuprofen (MOTRIN) 800 mg tablet Take 1 tablet (800 mg total) by mouth every 8 (eight) hours as needed for moderate pain 60 tablet 2    nystatin (MYCOSTATIN) ointment Apply topically 2 (two) times a day 30 g 0    polyethylene glycol (GLYCOLAX) 17 GM/SCOOP powder Take 17 g by mouth daily 578 g 3    wheat dextrin (BENEFIBER) Take 1 packet by mouth daily 28 each 3     No current facility-administered medications for this visit  Current Outpatient Medications on File Prior to Visit   Medication Sig    estradiol (ESTRACE) 0 1 mg/g vaginal cream Insert 0 5 g into the vagina 2 (two) times a week (Patient not taking: Reported on 12/10/2020)    ibuprofen (MOTRIN) 800 mg tablet Take 1 tablet (800 mg total) by mouth every 8 (eight) hours as needed for moderate pain    nystatin (MYCOSTATIN) ointment Apply topically 2 (two) times a day    polyethylene glycol (GLYCOLAX) 17 GM/SCOOP powder Take 17 g by mouth daily    wheat dextrin (BENEFIBER) Take 1 packet by mouth daily     No current facility-administered medications on file prior to visit  She has No Known Allergies       Review of Systems   Constitutional: Negative for activity change, appetite change, chills, fatigue and fever  HENT: Negative for rhinorrhea, sneezing and sore throat  Eyes: Negative for visual disturbance  Respiratory: Negative for cough, shortness of breath and wheezing  Cardiovascular: Negative for chest pain, palpitations and leg swelling  Gastrointestinal: Negative for abdominal distention, constipation, diarrhea, nausea and vomiting  Genitourinary: Negative for difficulty urinating, dysuria, pelvic pain, urgency and vaginal discharge  Neurological: Negative for syncope, light-headedness and headaches  Objective:      /80   Wt 95 7 kg (211 lb)   LMP 02/11/2022   BMI 35 11 kg/m²          Physical Exam  Chest:   Breasts: Breasts are symmetrical       Right: No inverted nipple, mass, nipple discharge, skin change or tenderness  Left: No inverted nipple, mass, nipple discharge, skin change or tenderness         Genitourinary:     Labia:         Right: No rash, tenderness, lesion or injury  Left: No rash, tenderness, lesion or injury  Vagina: Normal  No vaginal discharge, tenderness or bleeding  Cervix: No cervical motion tenderness, discharge or friability  Uterus: Not deviated, not enlarged, not fixed and not tender  Adnexa:         Right: No mass, tenderness or fullness  Left: No mass, tenderness or fullness  Neurological:      Mental Status: She is alert and oriented to person, place, and time

## 2022-02-14 NOTE — ASSESSMENT & PLAN NOTE
Pap/HPV collected  Mammogram ordered    Encourage healthy diet, exercise, Calcium 1200mg per day and at least 800 iu Vitamin D daily  Vulvar irritation - atrophy most likely cause    Has just started estrogen cream

## 2022-02-14 NOTE — PATIENT INSTRUCTIONS
Wellness Visit for Adults   WHAT YOU NEED TO KNOW:   What is a wellness visit? A wellness visit is when you see your healthcare provider to get screened for health problems  Your healthcare provider will also give you advice on how to stay healthy  Write down your questions so you remember to ask them  Ask your healthcare provider how often you should have a wellness visit  What happens at a wellness visit? Your healthcare provider will ask about your health, and your family history of health problems  This includes high blood pressure, heart disease, and cancer  He or she will ask if you have symptoms that concern you, if you smoke, and about your mood  You may also be asked about your intake of medicines, supplements, food, and alcohol  Any of the following may be done:  · Your weight  will be checked  Your height may also be checked so your body mass index (BMI) can be calculated  Your BMI shows if you are at a healthy weight  · Your blood pressure  and heart rate will be checked  Your temperature may also be checked  · Blood and urine tests  may be done  Blood tests may be done to check your cholesterol levels  Abnormal cholesterol levels increase your risk for heart disease and stroke  You may also need a blood or urine test to check for diabetes if you are at increased risk  Urine tests may be done to look for signs of an infection or kidney disease  · A physical exam  includes checking your heartbeat and lungs with a stethoscope  Your healthcare provider may also check your skin to look for sun damage  · Screening tests  may be recommended  A screening test is done to check for diseases that may not cause symptoms  The screening tests you may need depend on your age, gender, family history, and lifestyle habits  For example, colorectal screening may be recommended if you are 48years old or older  What screening tests do I need if I am a woman?    · A Pap smear  is used to screen for cervical cancer  Pap smears are usually done every 3 to 5 years depending on your age  You may need them more often if you have had abnormal Pap smear test results in the past  Ask your healthcare provider how often you should have a Pap smear  · A mammogram  is an x-ray of your breasts to screen for breast cancer  Experts recommend mammograms every 2 years starting at age 48 years  You may need a mammogram at age 52 years or younger if you have an increased risk for breast cancer  Talk to your healthcare provider about when you should start having mammograms and how often you need them  What vaccines might I need? · Get an influenza vaccine  every year  The influenza vaccine protects you from the flu  Several types of viruses cause the flu  The viruses change over time, so new vaccines are made each year  · Get a tetanus-diphtheria (Td) booster vaccine  every 10 years  This vaccine protects you against tetanus and diphtheria  Tetanus is a severe infection that may cause painful muscle spasms and lockjaw  Diphtheria is a severe bacterial infection that causes a thick covering in the back of your mouth and throat  · Get a human papillomavirus (HPV) vaccine  if you are female and aged 23 to 32 or male 23 to 24 and never received it  This vaccine protects you from HPV infection  HPV is the most common infection spread by sexual contact  HPV may also cause vaginal, penile, and anal cancers  · Get a pneumococcal vaccine  if you are aged 72 years or older  The pneumococcal vaccine is an injection given to protect you from pneumococcal disease  Pneumococcal disease is an infection caused by pneumococcal bacteria  The infection may cause pneumonia, meningitis, or an ear infection  · Get a shingles vaccine  if you are 60 or older, even if you have had shingles before  The shingles vaccine is an injection to protect you from the varicella-zoster virus  This is the same virus that causes chickenpox   Shingles is a painful rash that develops in people who had chickenpox or have been exposed to the virus  How can I eat healthy? My Plate is a model for planning healthy meals  It shows the types and amounts of foods that should go on your plate  Fruits and vegetables make up about half of your plate, and grains and protein make up the other half  A serving of dairy is included on the side of your plate  The amount of calories and serving sizes you need depends on your age, gender, weight, and height  Examples of healthy foods are listed below:  · Eat a variety of vegetables  such as dark green, red, and orange vegetables  You can also include canned vegetables low in sodium (salt) and frozen vegetables without added butter or sauces  · Eat a variety of fresh fruits , canned fruit in 100% juice, frozen fruit, and dried fruit  · Include whole grains  At least half of the grains you eat should be whole grains  Examples include whole-wheat bread, wheat pasta, brown rice, and whole-grain cereals such as oatmeal     · Eat a variety of protein foods such as seafood (fish and shellfish), lean meat, and poultry without skin (turkey and chicken)  Examples of lean meats include pork leg, shoulder, or tenderloin, and beef round, sirloin, tenderloin, and extra lean ground beef  Other protein foods include eggs and egg substitutes, beans, peas, soy products, nuts, and seeds  · Choose low-fat dairy products such as skim or 1% milk or low-fat yogurt, cheese, and cottage cheese  · Limit unhealthy fats  such as butter, hard margarine, and shortening  How much exercise do I need? Exercise at least 30 minutes per day on most days of the week  Some examples of exercise include walking, biking, dancing, and swimming  You can also fit in more physical activity by taking the stairs instead of the elevator or parking farther away from stores  Include muscle strengthening activities 2 days each week   Regular exercise provides many health benefits  It helps you manage your weight, and decreases your risk for type 2 diabetes, heart disease, stroke, and high blood pressure  Exercise can also help improve your mood  Ask your healthcare provider about the best exercise plan for you  What are some general health and safety guidelines I should follow? · Do not smoke  Nicotine and other chemicals in cigarettes and cigars can cause lung damage  Ask your healthcare provider for information if you currently smoke and need help to quit  E-cigarettes or smokeless tobacco still contain nicotine  Talk to your healthcare provider before you use these products  · Limit alcohol  A drink of alcohol is 12 ounces of beer, 5 ounces of wine, or 1½ ounces of liquor  · Lose weight, if needed  Being overweight increases your risk of certain health conditions  These include heart disease, high blood pressure, type 2 diabetes, and certain types of cancer  · Protect your skin  Do not sunbathe or use tanning beds  Use sunscreen with a SPF 15 or higher  Apply sunscreen at least 15 minutes before you go outside  Reapply sunscreen every 2 hours  Wear protective clothing, hats, and sunglasses when you are outside  · Drive safely  Always wear your seatbelt  Make sure everyone in your car wears a seatbelt  A seatbelt can save your life if you are in an accident  Do not use your cell phone when you are driving  This could distract you and cause an accident  Pull over if you need to make a call or send a text message  · Practice safe sex  Use latex condoms if are sexually active and have more than one partner  Your healthcare provider may recommend screening tests for sexually transmitted infections (STIs)  · Wear helmets, lifejackets, and protective gear  Always wear a helmet when you ride a bike or motorcycle, go skiing, or play sports that could cause a head injury  Wear protective equipment when you play sports   Wear a lifejacket when you are on a boat or doing water sports  CARE AGREEMENT:   You have the right to help plan your care  Learn about your health condition and how it may be treated  Discuss treatment options with your healthcare providers to decide what care you want to receive  You always have the right to refuse treatment  The above information is an  only  It is not intended as medical advice for individual conditions or treatments  Talk to your doctor, nurse or pharmacist before following any medical regimen to see if it is safe and effective for you  © Copyright [employer] 2021 Information is for End User's use only and may not be sold, redistributed or otherwise used for commercial purposes   All illustrations and images included in CareNotes® are the copyrighted property of A D A M , Inc  or [address] [place] [address]

## 2022-02-17 ENCOUNTER — HOSPITAL ENCOUNTER (OUTPATIENT)
Dept: MAMMOGRAPHY | Facility: CLINIC | Age: 47
Discharge: HOME | End: 2022-02-17
Payer: COMMERCIAL

## 2022-02-17 VITALS — BODY MASS INDEX: 35.16 KG/M2 | HEIGHT: 65 IN | WEIGHT: 211 LBS

## 2022-02-17 DIAGNOSIS — R92.8 ABNORMAL MAMMOGRAM: ICD-10-CM

## 2022-02-17 LAB
HPV HR 12 DNA CVX QL NAA+PROBE: NEGATIVE
HPV16 DNA CVX QL NAA+PROBE: NEGATIVE
HPV18 DNA CVX QL NAA+PROBE: NEGATIVE

## 2022-02-17 PROCEDURE — 77065 DX MAMMO INCL CAD UNI: CPT

## 2022-02-21 ENCOUNTER — OFFICE VISIT (OUTPATIENT)
Dept: FAMILY MEDICINE CLINIC | Facility: CLINIC | Age: 47
End: 2022-02-21
Payer: COMMERCIAL

## 2022-02-21 VITALS
OXYGEN SATURATION: 98 % | HEART RATE: 74 BPM | DIASTOLIC BLOOD PRESSURE: 64 MMHG | SYSTOLIC BLOOD PRESSURE: 122 MMHG | BODY MASS INDEX: 34.99 KG/M2 | WEIGHT: 210 LBS | HEIGHT: 65 IN

## 2022-02-21 DIAGNOSIS — Z13.220 NEED FOR LIPID SCREENING: ICD-10-CM

## 2022-02-21 DIAGNOSIS — R73.01 IMPAIRED FASTING GLUCOSE: ICD-10-CM

## 2022-02-21 DIAGNOSIS — M72.2 PLANTAR FASCIITIS OF LEFT FOOT: Primary | ICD-10-CM

## 2022-02-21 DIAGNOSIS — R92.1 BREAST CALCIFICATIONS: Primary | ICD-10-CM

## 2022-02-21 LAB
LAB AP GYN PRIMARY INTERPRETATION: NORMAL
Lab: NORMAL

## 2022-02-21 PROCEDURE — 99213 OFFICE O/P EST LOW 20 MIN: CPT | Performed by: FAMILY MEDICINE

## 2022-02-21 NOTE — PROGRESS NOTES
Assessment/Plan:    No problem-specific Assessment & Plan notes found for this encounter  Diagnoses and all orders for this visit:    Plantar fasciitis of left foot  Recommend ibuprofen OTC as needed  Also recommend to wear heel protective insoles  -     Ambulatory Referral to Physical Therapy; Future    Impaired fasting glucose  -     Comprehensive metabolic panel; Future  -     Hemoglobin A1C; Future    Need for lipid screening  -     Lipid panel; Future      Follow up as needed    Subjective:      Patient ID: Gurwinder Shafer is a 40 y o  female  Patient is here due to left heel pain  She deneis any injuries 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  She says that its worse at the end of the day  The following portions of the patient's history were reviewed and updated as appropriate:   She  has a past medical history of Headache(784 0), Migraine, Nasal turbinate hypertrophy, Tonsillitis, chronic, and Wears glasses  She   Patient Active Problem List    Diagnosis Date Noted    Acute midline low back pain without sciatica 2021    Body mass index (BMI) of 34 0-34 9 in adult 2021    Impaired fasting glucose 2021    Hemorrhoids, external 2021    COVID-19 virus infection 2021    S/P tonsillectomy 2020    Plantar fasciitis of left foot 2019    Hypertrophy of both inferior nasal turbinates 10/28/2019    Other chronic diseases of tonsils and adenoids 10/28/2019    Encounter for gynecological examination without abnormal finding 12/10/2018    Prediabetes 2018    Anemia 2017    Constipation 10/03/2017    Irritable bowel syndrome with constipation 10/03/2017     She  has a past surgical history that includes  section; Appendectomy; Tubal ligation; TONSILLECTOMY (Bilateral, 2019); Turbinate resection (N/A, 2019);  Mammo stereotactic breast biopsy left (all inc) (Left, 2020); and Breast biopsy (Left, 08/2020)  Her family history includes Breast cancer (age of onset: 39) in her maternal aunt; Cancer in her family and maternal grandmother; No Known Problems in her daughter, daughter, father, maternal aunt, maternal aunt, maternal grandfather, mother, paternal grandfather, and paternal grandmother  She  reports that she has never smoked  She has never used smokeless tobacco  She reports current alcohol use of about 1 0 - 2 0 standard drink of alcohol per week  She reports that she does not use drugs  Current Outpatient Medications   Medication Sig Dispense Refill    estradiol (ESTRACE) 0 1 mg/g vaginal cream Insert 0 5 g into the vagina 2 (two) times a week (Patient not taking: Reported on 12/10/2020) 42 5 g 3    nystatin (MYCOSTATIN) ointment Apply topically 2 (two) times a day 30 g 0    polyethylene glycol (GLYCOLAX) 17 GM/SCOOP powder Take 17 g by mouth daily 578 g 3    wheat dextrin (BENEFIBER) Take 1 packet by mouth daily 28 each 3     No current facility-administered medications for this visit  Current Outpatient Medications on File Prior to Visit   Medication Sig    estradiol (ESTRACE) 0 1 mg/g vaginal cream Insert 0 5 g into the vagina 2 (two) times a week (Patient not taking: Reported on 12/10/2020)    nystatin (MYCOSTATIN) ointment Apply topically 2 (two) times a day    polyethylene glycol (GLYCOLAX) 17 GM/SCOOP powder Take 17 g by mouth daily    wheat dextrin (BENEFIBER) Take 1 packet by mouth daily    [DISCONTINUED] ibuprofen (MOTRIN) 800 mg tablet Take 1 tablet (800 mg total) by mouth every 8 (eight) hours as needed for moderate pain     No current facility-administered medications on file prior to visit  She has No Known Allergies       Review of Systems   Constitutional: Negative for activity change, appetite change, fatigue and fever  HENT: Negative for congestion and ear discharge  Respiratory: Negative for cough and shortness of breath      Cardiovascular: Negative for chest pain and palpitations  Gastrointestinal: Negative for diarrhea and nausea  Musculoskeletal: Positive for arthralgias and myalgias  Negative for back pain  Skin: Negative for color change and rash  Neurological: Negative for dizziness and headaches  Psychiatric/Behavioral: Negative for agitation and behavioral problems  Objective:      /64   Pulse 74   Ht 5' 5" (1 651 m)   Wt 95 3 kg (210 lb)   LMP 02/11/2022   SpO2 98%   BMI 34 95 kg/m²          Physical Exam  Constitutional:       General: She is not in acute distress  Appearance: She is well-developed  She is not diaphoretic  HENT:      Head: Normocephalic and atraumatic  Nose: Nose normal    Eyes:      Conjunctiva/sclera: Conjunctivae normal       Pupils: Pupils are equal, round, and reactive to light  Cardiovascular:      Rate and Rhythm: Normal rate and regular rhythm  Heart sounds: Normal heart sounds  No murmur heard  Pulmonary:      Effort: Pulmonary effort is normal  No respiratory distress  Breath sounds: Normal breath sounds  No wheezing  Abdominal:      General: Bowel sounds are normal  There is no distension  Palpations: Abdomen is soft  Tenderness: There is no abdominal tenderness  Skin:     General: Skin is warm and dry  Findings: No erythema or rash  Neurological:      Mental Status: She is alert and oriented to person, place, and time

## 2022-04-20 ENCOUNTER — TELEPHONE (OUTPATIENT)
Dept: OBGYN CLINIC | Facility: CLINIC | Age: 47
End: 2022-04-20

## 2022-04-20 DIAGNOSIS — B37.9 YEAST INFECTION: Primary | ICD-10-CM

## 2022-04-20 RX ORDER — FLUCONAZOLE 150 MG/1
TABLET ORAL
Qty: 2 TABLET | Refills: 0 | Status: SHIPPED | OUTPATIENT
Start: 2022-04-20 | End: 2022-04-23

## 2022-04-20 NOTE — TELEPHONE ENCOUNTER
Pt had diflucan in Dec    Sx are white dsch, itching, burning for past 2 days   Would like diflucan to [facility]   Thank you

## 2022-04-20 NOTE — TELEPHONE ENCOUNTER
----- Message from Kerri Cross sent at 4/20/2022 11:15 AM EDT -----  Regarding: Yeast infection   I have a yeast infection   Can you send in a prescription for me please

## 2022-07-28 ENCOUNTER — TELEPHONE (OUTPATIENT)
Dept: PHYSICAL THERAPY | Facility: OTHER | Age: 47
End: 2022-07-28

## 2022-07-28 NOTE — TELEPHONE ENCOUNTER
Called patient per comp spine online appointment request that was filled out  Unable to leave a voice message due to Voice Mailbox being full      Will wait for call back to discuss our program

## 2022-08-30 ENCOUNTER — OFFICE VISIT (OUTPATIENT)
Dept: OBGYN CLINIC | Age: 47
End: 2022-08-30
Payer: COMMERCIAL

## 2022-08-30 VITALS
DIASTOLIC BLOOD PRESSURE: 78 MMHG | BODY MASS INDEX: 35.82 KG/M2 | HEIGHT: 65 IN | WEIGHT: 215 LBS | SYSTOLIC BLOOD PRESSURE: 122 MMHG

## 2022-08-30 DIAGNOSIS — N76.1 SUBACUTE VAGINITIS: ICD-10-CM

## 2022-08-30 DIAGNOSIS — N92.6 IRREGULAR MENSES: ICD-10-CM

## 2022-08-30 DIAGNOSIS — N94.10 FEMALE DYSPAREUNIA: Primary | ICD-10-CM

## 2022-08-30 DIAGNOSIS — Z11.3 SCREENING EXAMINATION FOR STD (SEXUALLY TRANSMITTED DISEASE): ICD-10-CM

## 2022-08-30 PROBLEM — Z01.419 ENCOUNTER FOR GYNECOLOGICAL EXAMINATION WITHOUT ABNORMAL FINDING: Status: RESOLVED | Noted: 2018-12-10 | Resolved: 2022-08-30

## 2022-08-30 PROBLEM — U07.1 COVID-19 VIRUS INFECTION: Status: RESOLVED | Noted: 2021-03-19 | Resolved: 2022-08-30

## 2022-08-30 PROCEDURE — 87491 CHLMYD TRACH DNA AMP PROBE: CPT | Performed by: NURSE PRACTITIONER

## 2022-08-30 PROCEDURE — 87591 N.GONORRHOEAE DNA AMP PROB: CPT | Performed by: NURSE PRACTITIONER

## 2022-08-30 PROCEDURE — 87661 TRICHOMONAS VAGINALIS AMPLIF: CPT | Performed by: NURSE PRACTITIONER

## 2022-08-30 PROCEDURE — 99214 OFFICE O/P EST MOD 30 MIN: CPT | Performed by: NURSE PRACTITIONER

## 2022-08-30 RX ORDER — NYSTATIN 100000 U/G
OINTMENT TOPICAL 2 TIMES DAILY
Qty: 30 G | Refills: 1 | Status: SHIPPED | OUTPATIENT
Start: 2022-08-30 | End: 2022-09-27

## 2022-08-30 RX ORDER — ESTRADIOL 0.1 MG/G
0.5 CREAM VAGINAL 2 TIMES WEEKLY
Qty: 42.5 G | Refills: 3 | Status: SHIPPED | OUTPATIENT
Start: 2022-09-01

## 2022-08-30 NOTE — PROGRESS NOTES
Diagnoses and all orders for this visit:    Female dyspareunia  -     estradiol (ESTRACE) 0 1 mg/g vaginal cream; Insert 0 5 g into the vagina 2 (two) times a week    Subacute vaginitis  -     nystatin (MYCOSTATIN) ointment; Apply topically 2 (two) times a day for 28 days    Irregular menses  -     TSH, 3rd generation with Free T4 reflex; Future    Screening examination for STD (sexually transmitted disease)  -     Chlamydia/GC amplified DNA by PCR  -     Trichomonas Vaginalis, TERESA        Call if no symptom improvement, all questions answered, return for recheck in 3 mos with menses calendar  Pleasant 40 y o  here for complaints of vaginal dryness for which she uses vaginal estrace BUT she stopped it because she felt it was changing her cycle  She states her cycles are usually every 26 days but feels lately they are more irregular  TSH last checked was in 2018 and normal   We will recheck that today  She was using vaginal Estrace with relief  We discussed perimenopausal symptoms and she feels comfortable restarting it because it did help  She is pre-diabetic and uses nystatin as well prn  She denies any other treatments tried  She denies recent antibiotic use  She denies douching  She denies fever or pelvic pain  She denies new sexual partners  She is monogamous with her partner of 1 year, recently   She requests STD testing which she does fairly regularly  She does report recent urinary frequency but is not sure if it is related to her high water intake      Past Medical History:   Diagnosis Date    COVID-19 virus infection 3/19/2021    Headache(784 0)     Migraine     Nasal turbinate hypertrophy     OR correction today 2019    Tonsillitis, chronic     Wears glasses      Past Surgical History:   Procedure Laterality Date    APPENDECTOMY      BREAST BIOPSY Left 2020    benign     SECTION      MAMMO STEREOTACTIC BREAST BIOPSY LEFT (ALL INC) Left 2020    TONSILLECTOMY Bilateral 2019    Procedure: TONSILLECTOMY;  Surgeon: Neyda Sidhu DO;  Location: [facility];  Service: ENT    TUBAL LIGATION      TURBINATE RESECTION N/A 2019    Procedure: PARTIAL INFERIOR TURBINECTOMY WITH OUTFRACTURE;  Surgeon: Neyda Sidhu DO;  Location: [facility];  Service: ENT     Social History     Tobacco Use    Smoking status: Never Smoker    Smokeless tobacco: Never Used   Vaping Use    Vaping Use: Never used   Substance Use Topics    Alcohol use:  Yes     Alcohol/week: 1 0 - 2 0 standard drink     Types: 1 - 2 Glasses of wine per week     Comment: Social     Drug use: No     Family History   Problem Relation Age of Onset    Cancer Family     No Known Problems Mother     No Known Problems Father     No Known Problems Daughter     No Known Problems Daughter     No Known Problems Maternal Aunt     Breast cancer Maternal Aunt 39    No Known Problems Maternal Aunt     Cancer Maternal Grandmother     No Known Problems Paternal Grandmother     No Known Problems Maternal Grandfather     No Known Problems Paternal Grandfather     BRCA2 Positive Neg Hx     BRCA2 Negative Neg Hx     BRCA1 Positive Neg Hx     BRCA1 Negative Neg Hx     Ovarian cancer Neg Hx        Current Outpatient Medications:     [START ON 2022] estradiol (ESTRACE) 0 1 mg/g vaginal cream, Insert 0 5 g into the vagina 2 (two) times a week, Disp: 42 5 g, Rfl: 3    nystatin (MYCOSTATIN) ointment, Apply topically 2 (two) times a day for 28 days, Disp: 30 g, Rfl: 1    No Known Allergies  OB History    Para Term  AB Living   4 3 3     3   SAB IAB Ectopic Multiple Live Births           3      # Outcome Date GA Lbr Manuel/2nd Weight Sex Delivery Anes PTL Lv   4             3 Term            2 Term            1 Term              Works as an INFUSION TECH for [employer]    Vitals:    22 0849   BP: 122/78   BP Location: Right arm   Patient Position: Sitting   Cuff Size: Standard Weight: 97 5 kg (215 lb)   Height: 5' 5" (1 651 m)     Body mass index is 35 78 kg/m²  Patient's last menstrual period was 08/02/2022 (approximate)  Review of Systems   Constitutional: Negative for chills, fatigue, fever and unexpected weight change  Respiratory: Negative for shortness of breath  Gastrointestinal: Negative for anal bleeding, blood in stool and diarrhea  +Chronic Constipation-declines referral to GI  Genitourinary: Negative for difficulty urinating, dysuria and hematuria  Physical Exam   Constitutional: She appears well-developed and well-nourished  No distress  Alert and oriented  HENT: atraumatic  Head: Normocephalic  Neck: Normal range of motion  Neck supple  Pulmonary: Effort normal   Abdominal: Soft  Pelvic exam was performed with patient supine  No labial fusion  There is no rash, tenderness, lesion or injury on the right labia  There is no rash, tenderness, lesion or injury on the left labia  Urethral meatus does not show any tenderness, inflammation or discharge  Palpation of midline bladder without pain or discomfort  Uterus is not deviated, not enlarged, not fixed and not tender  Cervix exhibits no motion tenderness, no discharge and no friability  Right adnexum displays no mass, no tenderness and no fullness  Left adnexum displays no mass, no tenderness and no fullness  No erythema or tenderness in the vagina  No foreign body in the vagina  No signs of injury around the vagina  NO Vaginal discharge found  Perineum and anus without areas of injury  No lesions noted or swelling  Lymphadenopathy:        Right: No inguinal adenopathy present  Left: No inguinal adenopathy present

## 2022-08-30 NOTE — PROGRESS NOTES
Patient presents for: medication and STD testing     Menarche- 16  Last Pap Smear- 02/14/2022  Hx of abnormal paps-neverBirth control- tubal    Mammogram- 02/17/2022  DXA- not on file   Colonoscopy- not on file     Smoking status-never  Last sexual activity- yes  Family history of uterine, ovarian, cervical or breast cancer-  Maternal Aunts breast cancer, Moms family all had breast cancer and uterine cancer  Concerns-    Stopped estrace because she felt that it was messing with her cycle

## 2022-08-30 NOTE — PATIENT INSTRUCTIONS
Dermatitis   WHAT YOU NEED TO KNOW:   Dermatitis is skin inflammation  You may have an itchy rash, redness, or swelling  You may also have bumps or blisters that crust over or ooze clear fluid  Dermatitis can be caused by allergens such as dust mites, pet dander, pollen, and certain foods  It can also develop when something touches your skin and irritates it or causes an allergic reaction  Examples include soaps, chemicals, latex, and poison ivy  DISCHARGE INSTRUCTIONS:   Call your local emergency number (911 in the [address] [facility]) or have someone call if:   You have symptoms of anaphylaxis, such as sudden trouble breathing, throat swelling, or feeling dizzy or lightheaded  Return to the emergency department if:   You develop a fever or have red streaks going up your arm or leg  Your rash gets more swollen, red, or hot  Call your doctor or dermatologist if:   Your skin blisters, oozes white or yellow pus, or has a foul-smelling discharge  Your rash spreads or does not get better, even after treatment  You have questions or concerns about your condition or care  Medicines:   Medicines  help decrease itching and inflammation, or treat a bacterial infection  They may be given as a topical cream, shot, or a pill  Take your medicine as directed  Contact your healthcare provider if you think your medicine is not helping or if you have side effects  Tell him of her if you are allergic to any medicine  Keep a list of the medicines, vitamins, and herbs you take  Include the amounts, and when and why you take them  Bring the list or the pill bottles to follow-up visits  Carry your medicine list with you in case of an emergency  Manage dermatitis:   Apply a cool compress to your rash  This will help soothe your skin  Apply lotions or creams to the area  These help keep your skin moist and decrease itching  Apply the lotion or cream right after a lukewarm bath or shower when your skin is still damp   Use products that do not contain dye or a scent  Avoid skin irritants  Examples include makeup, hair products, soaps, and cleansers  Use products that do not contain a scent or dye  Follow up with your doctor or dermatologist as directed:  Write down your questions so you remember to ask them during your visits  © Copyright [employer] 2022 Information is for End User's use only and may not be sold, redistributed or otherwise used for commercial purposes  All illustrations and images included in CareNotes® are the copyrighted property of [employer]  or [facility] Maurisio Velasco   The above information is an  only  It is not intended as medical advice for individual conditions or treatments  Talk to your doctor, nurse or pharmacist before following any medical regimen to see if it is safe and effective for you

## 2022-09-01 LAB
C TRACH DNA SPEC QL NAA+PROBE: NEGATIVE
N GONORRHOEA DNA SPEC QL NAA+PROBE: NEGATIVE

## 2022-09-02 LAB — T VAGINALIS RRNA SPEC QL NAA+PROBE: NEGATIVE

## 2022-09-08 ENCOUNTER — APPOINTMENT (OUTPATIENT)
Dept: LAB | Facility: HOSPITAL | Age: 47
End: 2022-09-08
Payer: COMMERCIAL

## 2022-09-08 DIAGNOSIS — Z00.8 ENCOUNTER FOR OTHER GENERAL EXAMINATION: ICD-10-CM

## 2022-09-08 DIAGNOSIS — N92.6 IRREGULAR MENSES: ICD-10-CM

## 2022-09-08 LAB
CHOLEST SERPL-MCNC: 208 MG/DL
EST. AVERAGE GLUCOSE BLD GHB EST-MCNC: 123 MG/DL
HBA1C MFR BLD: 5.9 %
HDLC SERPL-MCNC: 80 MG/DL
LDLC SERPL CALC-MCNC: 121 MG/DL (ref 0–100)
NONHDLC SERPL-MCNC: 128 MG/DL
TRIGL SERPL-MCNC: 33 MG/DL
TSH SERPL DL<=0.05 MIU/L-ACNC: 1.27 UIU/ML (ref 0.45–4.5)

## 2022-09-08 PROCEDURE — 83036 HEMOGLOBIN GLYCOSYLATED A1C: CPT

## 2022-09-08 PROCEDURE — 84443 ASSAY THYROID STIM HORMONE: CPT

## 2022-09-08 PROCEDURE — 36415 COLL VENOUS BLD VENIPUNCTURE: CPT

## 2022-09-08 PROCEDURE — 80061 LIPID PANEL: CPT

## 2022-09-20 ENCOUNTER — OFFICE VISIT (OUTPATIENT)
Dept: FAMILY MEDICINE CLINIC | Facility: CLINIC | Age: 47
End: 2022-09-20
Payer: COMMERCIAL

## 2022-09-20 VITALS
HEART RATE: 94 BPM | DIASTOLIC BLOOD PRESSURE: 84 MMHG | HEIGHT: 65 IN | BODY MASS INDEX: 35.59 KG/M2 | SYSTOLIC BLOOD PRESSURE: 120 MMHG | TEMPERATURE: 97.9 F | WEIGHT: 213.6 LBS | OXYGEN SATURATION: 99 %

## 2022-09-20 DIAGNOSIS — Z12.11 SCREENING FOR COLON CANCER: ICD-10-CM

## 2022-09-20 DIAGNOSIS — L02.411 ABSCESS OF AXILLA, RIGHT: Primary | ICD-10-CM

## 2022-09-20 DIAGNOSIS — Z12.31 SCREENING MAMMOGRAM FOR BREAST CANCER: ICD-10-CM

## 2022-09-20 PROCEDURE — 87186 SC STD MICRODIL/AGAR DIL: CPT | Performed by: FAMILY MEDICINE

## 2022-09-20 PROCEDURE — 87205 SMEAR GRAM STAIN: CPT | Performed by: FAMILY MEDICINE

## 2022-09-20 PROCEDURE — 87070 CULTURE OTHR SPECIMN AEROBIC: CPT | Performed by: FAMILY MEDICINE

## 2022-09-20 PROCEDURE — 10060 I&D ABSCESS SIMPLE/SINGLE: CPT | Performed by: FAMILY MEDICINE

## 2022-09-20 PROCEDURE — 99214 OFFICE O/P EST MOD 30 MIN: CPT | Performed by: FAMILY MEDICINE

## 2022-09-20 RX ORDER — IBUPROFEN 800 MG/1
800 TABLET ORAL EVERY 8 HOURS PRN
Qty: 60 TABLET | Refills: 2 | Status: SHIPPED | OUTPATIENT
Start: 2022-09-20 | End: 2022-09-28

## 2022-09-20 RX ORDER — SULFAMETHOXAZOLE AND TRIMETHOPRIM 800; 160 MG/1; MG/1
1 TABLET ORAL EVERY 12 HOURS SCHEDULED
Qty: 20 TABLET | Refills: 0 | Status: SHIPPED | OUTPATIENT
Start: 2022-09-20 | End: 2022-09-30

## 2022-09-20 NOTE — PROGRESS NOTES
Name: Sanjay Payton      : 1975      MRN: [id number]  Encounter Provider: Deliah Dancer, MD  Encounter Date: 2022   Encounter department: [address] [place], [place] [address]     1  Abscess of axilla, right  After discussing risks and benefits of medication along with side effects will start the following:  -     sulfamethoxazole-trimethoprim (BACTRIM DS) 800-160 mg per tablet; Take 1 tablet by mouth every 12 (twelve) hours for 10 days  -     ibuprofen (MOTRIN) 800 mg tablet; Take 1 tablet (800 mg total) by mouth every 8 (eight) hours as needed for mild pain    2  Screening mammogram for breast cancer  -     Mammo screening bilateral w 3d & cad; Future; Expected date: 2022    3  Screening for colon cancer  -     Ambulatory Referral to Gastroenterology; Future       Follow up as needed    Subjective     Patient is here because she has lump on her right axilla that has been bothering her and growing per patient  With associated tenderness  She had them in the past     Review of Systems   Constitutional: Negative for activity change, appetite change, fatigue and fever  HENT: Negative for congestion and ear discharge  Respiratory: Negative for cough and shortness of breath  Cardiovascular: Negative for chest pain and palpitations  Gastrointestinal: Negative for diarrhea and nausea  Musculoskeletal: Negative for arthralgias and back pain  Skin: Negative for color change and rash  Neurological: Negative for dizziness and headaches  Hematological: Positive for adenopathy  Psychiatric/Behavioral: Negative for agitation and behavioral problems         Past Medical History:   Diagnosis Date    COVID-19 virus infection 3/19/2021    Headache(784 0)     Migraine     Nasal turbinate hypertrophy     OR correction today 2019    Tonsillitis, chronic     Wears glasses      Past Surgical History:   Procedure Laterality Date    APPENDECTOMY      BREAST BIOPSY Left 2020    benign     SECTION      MAMMO STEREOTACTIC BREAST BIOPSY LEFT (ALL INC) Left 2020    TONSILLECTOMY Bilateral 2019    Procedure: TONSILLECTOMY;  Surgeon: Serjio Rutherford DO;  [facility];  Service: ENT    TUBAL LIGATION      TURBINATE RESECTION N/A 2019    Procedure: PARTIAL INFERIOR TURBINECTOMY WITH OUTFRACTURE;  Surgeon: Serjio Rutherford DO;  [facility];  Service: ENT     Family History   Problem Relation Age of Onset    Cancer Family     No Known Problems Mother     No Known Problems Father     No Known Problems Daughter     No Known Problems Daughter     No Known Problems Maternal Aunt     Breast cancer Maternal Aunt 39    No Known Problems Maternal Aunt     Cancer Maternal Grandmother     No Known Problems Paternal Grandmother     No Known Problems Maternal Grandfather     No Known Problems Paternal Grandfather     BRCA2 Positive Neg Hx     BRCA2 Negative Neg Hx     BRCA1 Positive Neg Hx     BRCA1 Negative Neg Hx     Ovarian cancer Neg Hx      Social History     Socioeconomic History    Marital status: /Civil Union     Spouse name: None    Number of children: None    Years of education: None    Highest education level: None   Occupational History    None   Tobacco Use    Smoking status: Never Smoker    Smokeless tobacco: Never Used   Vaping Use    Vaping Use: Never used   Substance and Sexual Activity    Alcohol use:  Yes     Alcohol/week: 1 0 - 2 0 standard drink     Types: 1 - 2 Glasses of wine per week     Comment: Social     Drug use: No    Sexual activity: Yes     Partners: Male     Birth control/protection: Female Sterilization   Other Topics Concern    None   Social History Narrative    None     Social Determinants of Health     Financial Resource Strain: Not on file   Food Insecurity: Not on file   Transportation Needs: Not on file   Physical Activity: Not on file   Stress: Not on file   Social Connections: Not on file   Intimate Partner Violence: Not on file   Housing Stability: Not on file     Current Outpatient Medications on File Prior to Visit   Medication Sig    estradiol (ESTRACE) 0 1 mg/g vaginal cream Insert 0 5 g into the vagina 2 (two) times a week    nystatin (MYCOSTATIN) ointment Apply topically 2 (two) times a day for 28 days     No Known Allergies  Immunization History   Administered Date(s) Administered    COVID-19 PFIZER VACCINE 0 3 ML IM 08/24/2021, 09/14/2021    INFLUENZA 11/09/2021    Influenza, injectable, quadrivalent, preservative free 0 5 mL 11/24/2020       Objective     /84 (BP Location: Left arm, Patient Position: Sitting)   Pulse 94   Temp 97 9 °F (36 6 °C) (Temporal)   Ht 5' 5" (1 651 m)   Wt 96 9 kg (213 lb 9 6 oz)   SpO2 99%   BMI 35 54 kg/m²     Physical Exam  Constitutional:       General: She is not in acute distress  Appearance: She is well-developed  She is not diaphoretic  HENT:      Head: Normocephalic and atraumatic  Nose: Nose normal    Eyes:      Conjunctiva/sclera: Conjunctivae normal       Pupils: Pupils are equal, round, and reactive to light  Cardiovascular:      Rate and Rhythm: Normal rate and regular rhythm  Heart sounds: Normal heart sounds  No murmur heard  Pulmonary:      Effort: Pulmonary effort is normal  No respiratory distress  Breath sounds: Normal breath sounds  No wheezing  Chest:   Breasts:      Right: Axillary adenopathy present  Abdominal:      General: Bowel sounds are normal  There is no distension  Palpations: Abdomen is soft  Tenderness: There is no abdominal tenderness  Lymphadenopathy:      Upper Body:      Right upper body: Axillary adenopathy present  Comments: Right axilla abscess   Skin:     General: Skin is warm and dry  Findings: No erythema or rash  Neurological:      Mental Status: She is alert and oriented to person, place, and time         Brandon Zapata MD   Incision and Drainage    Date/Time: 9/20/2022 4:29 PM  Performed by: Steven Shine MD  Authorized by: Steven Shine MD   Universal Protocol:  Consent: Verbal consent obtained  Written consent obtained  Risks and benefits: risks, benefits and alternatives were discussed  Consent given by: patient  Patient understanding: patient states understanding of the procedure being performed  Patient consent: the patient's understanding of the procedure does not match consent given  Procedure consent: procedure consent does not match procedure scheduled  Relevant documents: relevant documents not present or verified  Test results: test results not available  Site marked: the operative site was not marked  Radiology Images displayed and confirmed  If images not available, report reviewed: imaging studies not available  Patient identity confirmed: verbally with patient      Patient location:  Bedside  Location:     Type:  Abscess    Location:  Trunk    Trunk location: right axilla  Pre-procedure details:     Skin preparation:  Betadine  Sedation:     Sedation type: Anxiolysis  Anesthesia (see MAR for exact dosages): Anesthesia method:  Local infiltration    Local anesthetic:  Lidocaine 1% WITH epi  Procedure details:     Complexity:  Simple    Needle aspiration: no      Incision types:  Stab incision    Scalpel blade:  10    Approach:  Open    Incision depth:  Subcutaneous    Wound management:  Probed and deloculated    Drainage:  Purulent    Drainage amount:  Scant    Wound treatment:  Wound left open    Packing materials:  None  Post-procedure details:     Patient tolerance of procedure:   Tolerated well, no immediate complications

## 2022-09-20 NOTE — PROGRESS NOTES
BMI Counseling: Body mass index is 35 54 kg/m²  The BMI is above normal  Nutrition recommendations include reducing portion sizes, decreasing overall calorie intake and 3-5 servings of fruits/vegetables daily  Exercise recommendations include exercising 3-5 times per week

## 2022-09-22 LAB
BACTERIA WND AEROBE CULT: ABNORMAL
GRAM STN SPEC: ABNORMAL
GRAM STN SPEC: ABNORMAL

## 2022-09-28 ENCOUNTER — OFFICE VISIT (OUTPATIENT)
Dept: GASTROENTEROLOGY | Facility: CLINIC | Age: 47
End: 2022-09-28
Payer: COMMERCIAL

## 2022-09-28 VITALS
OXYGEN SATURATION: 98 % | SYSTOLIC BLOOD PRESSURE: 126 MMHG | HEART RATE: 70 BPM | BODY MASS INDEX: 33.12 KG/M2 | DIASTOLIC BLOOD PRESSURE: 82 MMHG | HEIGHT: 67 IN | WEIGHT: 211 LBS

## 2022-09-28 DIAGNOSIS — K62.5 BRBPR (BRIGHT RED BLOOD PER RECTUM): ICD-10-CM

## 2022-09-28 DIAGNOSIS — K58.1 IRRITABLE BOWEL SYNDROME WITH CONSTIPATION: ICD-10-CM

## 2022-09-28 DIAGNOSIS — Z12.11 SCREENING FOR COLON CANCER: Primary | ICD-10-CM

## 2022-09-28 PROCEDURE — 99203 OFFICE O/P NEW LOW 30 MIN: CPT | Performed by: PHYSICIAN ASSISTANT

## 2022-09-28 RX ORDER — LUBIPROSTONE 8 UG/1
8 CAPSULE, GELATIN COATED ORAL 2 TIMES DAILY WITH MEALS
Qty: 60 CAPSULE | Refills: 2 | Status: SHIPPED | OUTPATIENT
Start: 2022-09-28

## 2022-09-28 NOTE — PROGRESS NOTES
[facility] - Outpatient Consultation  Consuelo Santoro 40 y o  female MRN: [id number]  Encounter: [id number]          ASSESSMENT AND PLAN:      1  Screening for colon cancer  - Schedule colonoscopy for colon cancer screening  - She had a colonoscopy 8 years ago with another provider in [place] and she reports this was a normal exam    2  Irritable bowel syndrome with constipation  3  BRBPR (bright red blood per rectum)  - She has lifelong constipation associated with lower abdominal pain consistent with IBS-C and also reports BRBPR with bowel movements which is likely related to hemorrhoidal irritation  - Colonoscopy as above for further evaluation  - Start amitiza 8 mcg BID as this worked well for her in the past  - Discussed increasing water intake and fiber supplementation as well    ______________________________________________________________________    HPI:  Pino Dela Cruz is a pleasant 41 yo F presenting for evaluation of chronic constipation, abdominal pain, and to schedule a colonoscopy  She reports lifelong constipation that has seemed to worsen over time and is now interfering more in her daily life due to discomfort and BRBPR with bowel movements  She has tried to stay away from taking medication on a daily basis due to fear of side effects  She used to see a gastroenterologist in [place] and was on [address] which seemed to work pretty well for her but she was not taking this every day  She had a colonoscopy about 8 years ago and she believes this was normal   She reports a family history of multiple cancers but believes one of her great aunts had colon cancer  She works 2 jobs, 1 at the [employer] and 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 so she is very busy and needs to get on a schedule or regimen that will work with her busy lifestyle  REVIEW OF SYSTEMS:    CONSTITUTIONAL: Denies any fever, chills, rigors, and weight loss  HEENT: No earache or tinnitus   Denies hearing loss or visual disturbances  CARDIOVASCULAR: No chest pain or palpitations  RESPIRATORY: Denies any cough, hemoptysis, shortness of breath or dyspnea on exertion  GASTROINTESTINAL: As noted in the History of Present Illness  GENITOURINARY: No problems with urination  Denies any hematuria or dysuria  NEUROLOGIC: No dizziness or vertigo, denies headaches  MUSCULOSKELETAL: Denies any muscle or joint pain  SKIN: Denies skin rashes or itching  ENDOCRINE: Denies excessive thirst  Denies intolerance to heat or cold  PSYCHOSOCIAL: Denies depression or anxiety  Denies any recent memory loss         Historical Information   Past Medical History:   Diagnosis Date    COVID-19 virus infection 3/19/2021    Headache(784 0)     Migraine     Nasal turbinate hypertrophy     OR correction today 2019    Tonsillitis, chronic     Wears glasses      Past Surgical History:   Procedure Laterality Date    APPENDECTOMY      BREAST BIOPSY Left 2020    benign     SECTION      MAMMO STEREOTACTIC BREAST BIOPSY LEFT (ALL INC) Left 2020    TONSILLECTOMY Bilateral 2019    Procedure: TONSILLECTOMY;  Surgeon: Magdalena Ya DO;  [facility];  Service: ENT    TUBAL LIGATION      TURBINATE RESECTION N/A 2019    Procedure: PARTIAL INFERIOR TURBINECTOMY WITH OUTFRACTURE;  Surgeon: Magdalena Ya DO;  [facility];  Service: ENT     Social History   Social History     Substance and Sexual Activity   Alcohol Use Yes    Alcohol/week: 1 0 - 2 0 standard drink    Types: 1 - 2 Glasses of wine per week    Comment: Social      Social History     Substance and Sexual Activity   Drug Use No     Social History     Tobacco Use   Smoking Status Never Smoker   Smokeless Tobacco Never Used     Family History   Problem Relation Age of Onset    Cancer Family     No Known Problems Mother     No Known Problems Father     No Known Problems Daughter     No Known Problems Daughter    Wash [employer] No Known Problems Maternal Aunt     Breast cancer Maternal Aunt 39    No Known Problems Maternal Aunt     Cancer Maternal Grandmother     No Known Problems Paternal Grandmother     No Known Problems Maternal Grandfather     No Known Problems Paternal Grandfather     BRCA2 Positive Neg Hx     BRCA2 Negative Neg Hx     BRCA1 Positive Neg Hx     BRCA1 Negative Neg Hx     Ovarian cancer Neg Hx        Meds/Allergies       Current Outpatient Medications:     estradiol (ESTRACE) 0 1 mg/g vaginal cream    lubiprostone (AMITIZA) 8 mcg capsule    sulfamethoxazole-trimethoprim (BACTRIM DS) 800-160 mg per tablet    nystatin (MYCOSTATIN) ointment    No Known Allergies        Objective     Blood pressure 126/82, pulse 70, height 5' 7" (1 702 m), weight 95 7 kg (211 lb), SpO2 98 %, not currently breastfeeding  Body mass index is 33 05 kg/m²  PHYSICAL EXAM:      General Appearance:   Alert, cooperative, no distress   HEENT:   Normocephalic, atraumatic, anicteric      Neck:  Supple, symmetrical, trachea midline   Lungs:   Clear to auscultation bilaterally; no rales, rhonchi or wheezing; respirations unlabored    Heart[de-identified]   Regular rate and rhythm; no murmur, rub, or gallop  Abdomen:   Soft, non-tender, non-distended; normal bowel sounds; no masses, no organomegaly    Genitalia:   Deferred    Rectal:   Deferred    Extremities:  No cyanosis, clubbing or edema    Pulses:  2+ and symmetric    Skin:  No jaundice, rashes, or lesions    Lymph nodes:  No palpable cervical lymphadenopathy        Lab Results:   No visits with results within 1 Day(s) from this visit  Latest known visit with results is:   Office Visit on 09/20/2022   Component Date Value    Wound Culture 09/20/2022 4+ Growth of Staphylococcus aureus (A)    Gram Stain Result 09/20/2022 2+ Gram positive cocci in clusters (A)    Gram Stain Result 09/20/2022 No polys seen (A)         Radiology Results:   No results found

## 2022-09-28 NOTE — PATIENT INSTRUCTIONS
Scheduled date of colonoscopy (as of today):12/2/22  Physician performing colonoscopy: Stevan  Location of colonoscopy:[facility]  Bowel prep reviewed with patient:miralax/dulcolax - new prep  Instructions reviewed with patient by:Suzanne PINZON  Clearances:  none

## 2022-11-11 DIAGNOSIS — B37.9 YEAST INFECTION: Primary | ICD-10-CM

## 2022-11-11 RX ORDER — FLUCONAZOLE 150 MG/1
TABLET ORAL
Qty: 2 TABLET | Refills: 0 | Status: SHIPPED | OUTPATIENT
Start: 2022-11-11 | End: 2022-11-14

## 2022-11-11 NOTE — TELEPHONE ENCOUNTER
----- Message from Angelica Tsang sent at 11/11/2022  7:25 AM EST -----  Regarding: Yeast infection   Had   It’s not as bad as it was when I first messaged you

## 2022-11-30 ENCOUNTER — APPOINTMENT (OUTPATIENT)
Dept: LAB | Facility: HOSPITAL | Age: 47
End: 2022-11-30

## 2022-11-30 ENCOUNTER — OFFICE VISIT (OUTPATIENT)
Dept: OBGYN CLINIC | Facility: CLINIC | Age: 47
End: 2022-11-30

## 2022-11-30 VITALS — HEIGHT: 67 IN | BODY MASS INDEX: 33.43 KG/M2 | WEIGHT: 213 LBS

## 2022-11-30 DIAGNOSIS — Z11.3 SCREEN FOR STD (SEXUALLY TRANSMITTED DISEASE): Primary | ICD-10-CM

## 2022-11-30 DIAGNOSIS — Z11.3 SCREEN FOR STD (SEXUALLY TRANSMITTED DISEASE): ICD-10-CM

## 2022-11-30 DIAGNOSIS — N89.8 VAGINAL DRYNESS: Primary | ICD-10-CM

## 2022-11-30 DIAGNOSIS — Z13.220 NEED FOR LIPID SCREENING: ICD-10-CM

## 2022-11-30 DIAGNOSIS — R73.01 IMPAIRED FASTING GLUCOSE: ICD-10-CM

## 2022-11-30 PROBLEM — K59.00 CONSTIPATION: Status: RESOLVED | Noted: 2017-10-03 | Resolved: 2022-11-30

## 2022-11-30 LAB
ALBUMIN SERPL BCP-MCNC: 3 G/DL (ref 3.5–5)
ALP SERPL-CCNC: 84 U/L (ref 46–116)
ALT SERPL W P-5'-P-CCNC: 13 U/L (ref 12–78)
ANION GAP SERPL CALCULATED.3IONS-SCNC: 8 MMOL/L (ref 4–13)
AST SERPL W P-5'-P-CCNC: 9 U/L (ref 5–45)
BILIRUB SERPL-MCNC: 0.23 MG/DL (ref 0.2–1)
BUN SERPL-MCNC: 12 MG/DL (ref 5–25)
CALCIUM ALBUM COR SERPL-MCNC: 9.9 MG/DL (ref 8.3–10.1)
CALCIUM SERPL-MCNC: 9.1 MG/DL (ref 8.3–10.1)
CHLORIDE SERPL-SCNC: 111 MMOL/L (ref 96–108)
CHOLEST SERPL-MCNC: 158 MG/DL
CO2 SERPL-SCNC: 22 MMOL/L (ref 21–32)
CREAT SERPL-MCNC: 0.83 MG/DL (ref 0.6–1.3)
EST. AVERAGE GLUCOSE BLD GHB EST-MCNC: 114 MG/DL
GFR SERPL CREATININE-BSD FRML MDRD: 84 ML/MIN/1.73SQ M
GLUCOSE P FAST SERPL-MCNC: 111 MG/DL (ref 65–99)
HBA1C MFR BLD: 5.6 %
HBV SURFACE AG SER QL: NORMAL
HCV AB SER QL: NORMAL
HDLC SERPL-MCNC: 62 MG/DL
LDLC SERPL CALC-MCNC: 88 MG/DL (ref 0–100)
NONHDLC SERPL-MCNC: 96 MG/DL
POTASSIUM SERPL-SCNC: 3.9 MMOL/L (ref 3.5–5.3)
PROT SERPL-MCNC: 7.3 G/DL (ref 6.4–8.4)
SODIUM SERPL-SCNC: 141 MMOL/L (ref 135–147)
TRIGL SERPL-MCNC: 38 MG/DL

## 2022-11-30 NOTE — PROGRESS NOTES
Diagnoses and all orders for this visit:    Vaginal dryness    Screen for STD (sexually transmitted disease)  -     Human Immunodeficiency Virus 1/2 Antigen / Antibody ( Fourth Generation) with Reflex Testing; Future  -     Hepatitis C antibody; Future  -     Hepatitis B surface antigen; Future  -     RPR; Future  -     Chlamydia/GC amplified DNA by PCR    Call if no symptom improvement, all questions answered, return for annual         Pleasant 56 y o  here for POM testing since she is in a long distance relationship  She states she likes to test frequently  She denies vaginal complaints other than dryness which is releived by Estrace but she can't afford the $150 it costs  She does understand the treatment could last a few months but she can't cover the cost  She will try coconut oil  She denies any other treatments tried  She denies recent antibiotic use  She denies douching  She denies fever, pelvic pain or dyspareunia  She denies new sexual partners  Past Medical History:   Diagnosis Date   • COVID-19 virus infection 3/19/2021   • Headache(784 0)    • Migraine    • Nasal turbinate hypertrophy     OR correction today 2019   • Tonsillitis, chronic    • Wears glasses      Past Surgical History:   Procedure Laterality Date   • APPENDECTOMY     • BREAST BIOPSY Left 2020    benign   •  SECTION     • MAMMO STEREOTACTIC BREAST BIOPSY LEFT (ALL INC) Left 2020   • TONSILLECTOMY Bilateral 2019    Procedure: TONSILLECTOMY;  Surgeon: Krystina Tao DO;  [facility];  Service: ENT   • TUBAL LIGATION     • TURBINATE RESECTION N/A 2019    Procedure: PARTIAL INFERIOR TURBINECTOMY WITH OUTFRACTURE;  Surgeon: Krystina Tao DO;  [facility];  Service: ENT     Social History     Tobacco Use   • Smoking status: Never   • Smokeless tobacco: Never   Vaping Use   • Vaping Use: Never used   Substance Use Topics   • Alcohol use:  Yes     Alcohol/week: 1 0 - 2 0 standard drink Types: 1 - 2 Glasses of wine per week     Comment: Social    • Drug use: No     Family History   Problem Relation Age of Onset   • Cancer Family    • No Known Problems Mother    • No Known Problems Father    • No Known Problems Daughter    • No Known Problems Daughter    • No Known Problems Maternal Aunt    • Breast cancer Maternal Aunt 40   • No Known Problems Maternal Aunt    • Cancer Maternal Grandmother    • No Known Problems Paternal Grandmother    • No Known Problems Maternal Grandfather    • No Known Problems Paternal Grandfather    • BRCA2 Positive Neg Hx    • BRCA2 Negative Neg Hx    • BRCA1 Positive Neg Hx    • BRCA1 Negative Neg Hx    • Ovarian cancer Neg Hx        Current Outpatient Medications:   •  estradiol (ESTRACE) 0 1 mg/g vaginal cream, Insert 0 5 g into the vagina 2 (two) times a week, Disp: 42 5 g, Rfl: 3  •  lubiprostone (AMITIZA) 8 mcg capsule, Take 1 capsule (8 mcg total) by mouth 2 (two) times a day with meals, Disp: 60 capsule, Rfl: 2  •  nystatin (MYCOSTATIN) ointment, Apply topically 2 (two) times a day for 28 days, Disp: 30 g, Rfl: 1    No Known Allergies  OB History    Para Term  AB Living   4 3 3     3   SAB IAB Ectopic Multiple Live Births           3      # Outcome Date GA Lbr Manuel/2nd Weight Sex Delivery Anes PTL Lv   4             3 Term            2 Term            1 Term                Vitals:    22 0805   Weight: 96 6 kg (213 lb)   Height: 5' 7" (1 702 m)     Body mass index is 33 36 kg/m²  Patient's last menstrual period was 2022 (exact date)  Review of Systems   Constitutional: Negative for chills, fatigue, fever and unexpected weight change  Respiratory: Negative for shortness of breath  Gastrointestinal: Negative for anal bleeding, blood in stool and diarrhea  +IBS with constipation   Genitourinary: Negative for difficulty urinating, dysuria and hematuria       Physical Exam   Constitutional: She appears well-developed and well-nourished  No distress  Alert and oriented  HENT: atraumatic  Head: Normocephalic  Neck: Normal range of motion  Neck supple  Pulmonary: Effort normal   Abdominal: Soft  Pelvic exam was performed with patient supine  No labial fusion  There is no rash, tenderness, lesion or injury on the right labia  There is no rash, tenderness, lesion or injury on the left labia  Urethral meatus does not show any tenderness, inflammation or discharge  Palpation of midline bladder without pain or discomfort  Uterus is not deviated, not enlarged, not fixed and not tender  Cervix exhibits no motion tenderness, no discharge and no friability  Right adnexum displays no mass, no tenderness and no fullness  Left adnexum displays no mass, no tenderness and no fullness  No erythema or tenderness in the vagina  No foreign body in the vagina  No signs of injury around the vagina  NO Vaginal discharge found  Perineum and anus without areas of injury  No lesions noted or swelling  Lymphadenopathy:        Right: No inguinal adenopathy present  Left: No inguinal adenopathy present

## 2022-12-01 LAB
HIV 1+2 AB+HIV1 P24 AG SERPL QL IA: NORMAL
RPR SER QL: NORMAL

## 2022-12-01 RX ORDER — LIDOCAINE HYDROCHLORIDE 10 MG/ML
0.5 INJECTION, SOLUTION EPIDURAL; INFILTRATION; INTRACAUDAL; PERINEURAL ONCE AS NEEDED
Status: CANCELLED | OUTPATIENT
Start: 2022-12-01

## 2022-12-01 RX ORDER — SODIUM CHLORIDE, SODIUM LACTATE, POTASSIUM CHLORIDE, CALCIUM CHLORIDE 600; 310; 30; 20 MG/100ML; MG/100ML; MG/100ML; MG/100ML
125 INJECTION, SOLUTION INTRAVENOUS CONTINUOUS
Status: CANCELLED | OUTPATIENT
Start: 2022-12-01

## 2022-12-02 ENCOUNTER — ANESTHESIA EVENT (OUTPATIENT)
Dept: GASTROENTEROLOGY | Facility: HOSPITAL | Age: 47
End: 2022-12-02

## 2022-12-02 ENCOUNTER — HOSPITAL ENCOUNTER (OUTPATIENT)
Dept: GASTROENTEROLOGY | Facility: HOSPITAL | Age: 47
Setting detail: OUTPATIENT SURGERY
End: 2022-12-02

## 2022-12-02 ENCOUNTER — ANESTHESIA (OUTPATIENT)
Dept: GASTROENTEROLOGY | Facility: HOSPITAL | Age: 47
End: 2022-12-02

## 2022-12-02 VITALS
SYSTOLIC BLOOD PRESSURE: 117 MMHG | DIASTOLIC BLOOD PRESSURE: 66 MMHG | HEART RATE: 64 BPM | RESPIRATION RATE: 20 BRPM | TEMPERATURE: 98 F | OXYGEN SATURATION: 100 %

## 2022-12-02 DIAGNOSIS — K58.1 IRRITABLE BOWEL SYNDROME WITH CONSTIPATION: Primary | ICD-10-CM

## 2022-12-02 DIAGNOSIS — Z12.11 SCREENING FOR COLON CANCER: ICD-10-CM

## 2022-12-02 LAB
EXT PREGNANCY TEST URINE: NEGATIVE
EXT. CONTROL: NORMAL

## 2022-12-02 RX ORDER — LIDOCAINE HYDROCHLORIDE 10 MG/ML
INJECTION, SOLUTION EPIDURAL; INFILTRATION; INTRACAUDAL; PERINEURAL AS NEEDED
Status: DISCONTINUED | OUTPATIENT
Start: 2022-12-02 | End: 2022-12-02

## 2022-12-02 RX ORDER — LIDOCAINE HYDROCHLORIDE 10 MG/ML
0.5 INJECTION, SOLUTION EPIDURAL; INFILTRATION; INTRACAUDAL; PERINEURAL ONCE AS NEEDED
Status: DISCONTINUED | OUTPATIENT
Start: 2022-12-02 | End: 2022-12-06 | Stop reason: HOSPADM

## 2022-12-02 RX ORDER — SODIUM CHLORIDE, SODIUM LACTATE, POTASSIUM CHLORIDE, CALCIUM CHLORIDE 600; 310; 30; 20 MG/100ML; MG/100ML; MG/100ML; MG/100ML
125 INJECTION, SOLUTION INTRAVENOUS CONTINUOUS
Status: DISCONTINUED | OUTPATIENT
Start: 2022-12-02 | End: 2022-12-06 | Stop reason: HOSPADM

## 2022-12-02 RX ORDER — PROPOFOL 10 MG/ML
INJECTION, EMULSION INTRAVENOUS AS NEEDED
Status: DISCONTINUED | OUTPATIENT
Start: 2022-12-02 | End: 2022-12-02

## 2022-12-02 RX ADMIN — PROPOFOL 50 MG: 10 INJECTION, EMULSION INTRAVENOUS at 12:42

## 2022-12-02 RX ADMIN — SODIUM CHLORIDE, SODIUM LACTATE, POTASSIUM CHLORIDE, AND CALCIUM CHLORIDE 125 ML/HR: .6; .31; .03; .02 INJECTION, SOLUTION INTRAVENOUS at 12:00

## 2022-12-02 RX ADMIN — PROPOFOL 30 MG: 10 INJECTION, EMULSION INTRAVENOUS at 12:38

## 2022-12-02 RX ADMIN — PROPOFOL 120 MG: 10 INJECTION, EMULSION INTRAVENOUS at 12:36

## 2022-12-02 RX ADMIN — PROPOFOL 50 MG: 10 INJECTION, EMULSION INTRAVENOUS at 12:39

## 2022-12-02 RX ADMIN — LIDOCAINE HYDROCHLORIDE 50 MG: 10 INJECTION, SOLUTION EPIDURAL; INFILTRATION; INTRACAUDAL; PERINEURAL at 12:36

## 2022-12-02 RX ADMIN — PROPOFOL 50 MG: 10 INJECTION, EMULSION INTRAVENOUS at 12:44

## 2022-12-02 NOTE — H&P
History and Physical - [facility]  Donna Verma 56 y o  female MRN: [id number]                  HPI: Donna Verma is a 56y o  year old female who presents for for rectal bleeding and colon cancer screening      REVIEW OF SYSTEMS: Per the HPI, and otherwise unremarkable      Historical Information   Past Medical History:   Diagnosis Date   • COVID-19 virus infection 3/19/2021   • Headache(784 0)    • Migraine    • Nasal turbinate hypertrophy     OR correction today 2019   • Tonsillitis, chronic    • Wears glasses      Past Surgical History:   Procedure Laterality Date   • APPENDECTOMY     • BREAST BIOPSY Left 2020    benign   •  SECTION     • MAMMO STEREOTACTIC BREAST BIOPSY LEFT (ALL INC) Left 2020   • TONSILLECTOMY Bilateral 2019    Procedure: TONSILLECTOMY;  Surgeon: Verner Labor, DO;  [facility];  Service: ENT   • TUBAL LIGATION     • TURBINATE RESECTION N/A 2019    Procedure: PARTIAL INFERIOR TURBINECTOMY WITH OUTFRACTURE;  Surgeon: Verner Labor, DO;  [facility];  Service: ENT     Social History   Social History     Substance and Sexual Activity   Alcohol Use Yes   • Alcohol/week: 1 0 - 2 0 standard drink   • Types: 1 - 2 Glasses of wine per week    Comment: Social      Social History     Substance and Sexual Activity   Drug Use No     Social History     Tobacco Use   Smoking Status Never   Smokeless Tobacco Never     Family History   Problem Relation Age of Onset   • Cancer Family    • No Known Problems Mother    • No Known Problems Father    • No Known Problems Daughter    • No Known Problems Daughter    • No Known Problems Maternal Aunt    • Breast cancer Maternal Aunt 39   • No Known Problems Maternal Aunt    • Cancer Maternal Grandmother    • No Known Problems Paternal Grandmother    • No Known Problems Maternal Grandfather    • No Known Problems Paternal Grandfather    • BRCA2 Positive Neg Hx    • BRCA2 Negative Neg Hx    • BRCA1 Positive Neg Hx    • BRCA1 Negative Neg Hx    • Ovarian cancer Neg Hx        Meds/Allergies     (Not in a hospital admission)      No Known Allergies    Objective     Blood pressure 129/67, pulse 81, temperature 98 7 °F (37 1 °C), temperature source Temporal, resp  rate 16, last menstrual period 11/17/2022, SpO2 100 %, not currently breastfeeding  PHYSICAL EXAM    /67   Pulse 81   Temp 98 7 °F (37 1 °C) (Temporal)   Resp 16   LMP 11/17/2022 (Exact Date)   SpO2 100%       Gen: NAD  CV: RRR  CHEST: Clear  ABD: soft, NT/ND  EXT: no edema      ASSESSMENT/PLAN:  This is a 56y o  year old female here for colonoscopy, and she is stable and optimized for her procedure

## 2022-12-02 NOTE — ANESTHESIA POSTPROCEDURE EVALUATION
Post-Op Assessment Note    CV Status:  Stable  Pain Score: 0    Pain management: adequate     Mental Status:  Alert and awake   Hydration Status:  Euvolemic   PONV Controlled:  Controlled   Airway Patency:  Patent      Post Op Vitals Reviewed: Yes      Staff: CRNA         No notable events documented      BP   96/57   Temp      Pulse  82   Resp 20   SpO2   100

## 2022-12-02 NOTE — ANESTHESIA PREPROCEDURE EVALUATION
Procedure:  COLONOSCOPY    Relevant Problems   HEMATOLOGY   (+) Anemia      MUSCULOSKELETAL   (+) Acute midline low back pain without sciatica      Other   (+) Other chronic diseases of tonsils and adenoids   (+) Prediabetes   (+) S/P tonsillectomy        Physical Exam    Airway    Mallampati score: III  TM Distance: >3 FB  Neck ROM: full     Dental   No notable dental hx     Cardiovascular  Comment: Negative ROS, Rhythm: regular, Rate: normal, Cardiovascular exam normal    Pulmonary  Pulmonary exam normal Breath sounds clear to auscultation,     Other Findings        Anesthesia Plan  ASA Score- 2     Anesthesia Type- IV sedation with anesthesia with ASA Monitors  Additional Monitors:   Airway Plan:     Comment: Discussed risks/benefits, including medication reactions, awareness, aspiration, and serious/life threatening complications  Plan to maintain native airway with IVGA, monitored with EtCO2  Plan Factors-Exercise tolerance (METS): >4 METS  Patient summary reviewed  Patient instructed to abstain from smoking on day of procedure  Patient did not smoke on day of surgery  Induction- intravenous  Postoperative Plan-     Informed Consent- Anesthetic plan and risks discussed with patient  I personally reviewed this patient with the CRNA  Discussed and agreed on the Anesthesia Plan with the CRNA  Gi Abbott

## 2022-12-03 LAB
C TRACH DNA SPEC QL NAA+PROBE: NEGATIVE
N GONORRHOEA DNA SPEC QL NAA+PROBE: NEGATIVE

## 2022-12-12 ENCOUNTER — HOSPITAL ENCOUNTER (OUTPATIENT)
Dept: MAMMOGRAPHY | Facility: CLINIC | Age: 47
Discharge: HOME | End: 2022-12-12

## 2022-12-12 VITALS — HEIGHT: 67 IN | WEIGHT: 213 LBS | BODY MASS INDEX: 33.43 KG/M2

## 2022-12-12 DIAGNOSIS — R92.8 ABNORMAL MAMMOGRAM: ICD-10-CM

## 2023-01-11 ENCOUNTER — OFFICE VISIT (OUTPATIENT)
Dept: OBGYN CLINIC | Facility: CLINIC | Age: 48
End: 2023-01-11

## 2023-01-11 VITALS
SYSTOLIC BLOOD PRESSURE: 120 MMHG | WEIGHT: 215 LBS | HEIGHT: 67 IN | BODY MASS INDEX: 33.74 KG/M2 | DIASTOLIC BLOOD PRESSURE: 82 MMHG

## 2023-01-11 DIAGNOSIS — R73.03 PREDIABETES: ICD-10-CM

## 2023-01-11 DIAGNOSIS — Z11.3 SCREEN FOR STD (SEXUALLY TRANSMITTED DISEASE): ICD-10-CM

## 2023-01-11 DIAGNOSIS — N76.0 ACUTE VAGINITIS: Primary | ICD-10-CM

## 2023-01-11 DIAGNOSIS — B37.9 YEAST INFECTION: ICD-10-CM

## 2023-01-11 RX ORDER — FLUCONAZOLE 150 MG/1
150 TABLET ORAL ONCE
Qty: 2 TABLET | Refills: 3 | Status: SHIPPED | OUTPATIENT
Start: 2023-01-11 | End: 2023-01-11

## 2023-01-11 NOTE — PROGRESS NOTES
Diagnoses and all orders for this visit:    Acute vaginitis  -     fluconazole (DIFLUCAN) 150 mg tablet; Take 1 tablet (150 mg total) by mouth once for 1 dose Once and repeat in 3 days    Prediabetes    Yeast infection    Screen for STD (sexually transmitted disease)           -    GC/CT done today  Call if no symptom improvement, all questions answered, return for annual         Pleasant 56 y o  here for vaginal complaints of another yeast infection she is prediabetic and admits to having had a horrible diet during the holidays  She did use Monistat 1 last week  She is on her menses  She denies any other treatments tried  She denies recent antibiotic use  She denies douching  She denies fever, pelvic pain or dyspareunia  She denies new sexual partners but she is in a long-distance relationship and would like to be rescreened for STDs  Past Medical History:   Diagnosis Date   • COVID-19 virus infection 3/19/2021   • Headache(784 0)    • Migraine    • Nasal turbinate hypertrophy     OR correction today 2019   • Tonsillitis, chronic    • Wears glasses      Past Surgical History:   Procedure Laterality Date   • APPENDECTOMY     • BREAST BIOPSY Left 2020    benign   •  SECTION     • MAMMO STEREOTACTIC BREAST BIOPSY LEFT (ALL INC) Left 2020   • TONSILLECTOMY Bilateral 2019    Procedure: TONSILLECTOMY;  Surgeon: Niraj Das DO;  [facility];  Service: ENT   • TUBAL LIGATION     • TURBINATE RESECTION N/A 2019    Procedure: PARTIAL INFERIOR TURBINECTOMY WITH OUTFRACTURE;  Surgeon: Niraj Das DO;  Location[facility];  Service: ENT     Social History     Tobacco Use   • Smoking status: Never   • Smokeless tobacco: Never   Vaping Use   • Vaping Use: Never used   Substance Use Topics   • Alcohol use:  Yes     Alcohol/week: 1 0 - 2 0 standard drink     Types: 1 - 2 Glasses of wine per week     Comment: Social    • Drug use: No     Family History   Problem Relation Age of Onset   • No Known Problems Mother    • No Known Problems Father    • No Known Problems Daughter    • No Known Problems Daughter    • Cancer Maternal Grandmother    • No Known Problems Maternal Grandfather    • No Known Problems Paternal Grandmother    • No Known Problems Paternal Grandfather    • No Known Problems Maternal Aunt    • Breast cancer Maternal Aunt 39   • No Known Problems Maternal Aunt    • Cancer Family    • BRCA2 Positive Neg Hx    • BRCA2 Negative Neg Hx    • BRCA1 Positive Neg Hx    • BRCA1 Negative Neg Hx    • Ovarian cancer Neg Hx        Current Outpatient Medications:   •  fluconazole (DIFLUCAN) 150 mg tablet, Take 1 tablet (150 mg total) by mouth once for 1 dose Once and repeat in 3 days, Disp: 2 tablet, Rfl: 3  •  lubiprostone (AMITIZA) 24 mcg capsule, Take 1 capsule (24 mcg total) by mouth 2 (two) times a day with meals, Disp: 60 capsule, Rfl: 4  •  nystatin (MYCOSTATIN) ointment, Apply topically 2 (two) times a day for 28 days, Disp: 30 g, Rfl: 1    No Known Allergies  OB History    Para Term  AB Living   4 3 3     3   SAB IAB Ectopic Multiple Live Births           3      # Outcome Date GA Lbr Manuel/2nd Weight Sex Delivery Anes PTL Lv   4             3 Term            2 Term            1 Term                Vitals:    23 1035   BP: 120/82   Weight: 97 5 kg (215 lb)   Height: 5' 7" (1 702 m)     Body mass index is 33 67 kg/m²  Patient's last menstrual period was 2023  Review of Systems   Constitutional: Negative for chills, fatigue, fever and unexpected weight change  Respiratory: Negative for shortness of breath  Gastrointestinal: Negative for anal bleeding, blood in stool, constipation and diarrhea  Genitourinary: Negative for difficulty urinating, dysuria and hematuria  Physical Exam   Constitutional: She appears well-developed and well-nourished  No distress  Alert and oriented  HENT: atraumatic  Head: Normocephalic     Neck: Normal range of motion  Neck supple  Pulmonary: Effort normal   Abdominal: Soft  Pelvic exam was performed with patient supine  No labial fusion  There is no rash, tenderness, lesion or injury on the right labia  There is no rash, tenderness, lesion or injury on the left labia  Urethral meatus does not show any tenderness, inflammation or discharge  Palpation of midline bladder without pain or discomfort  Uterus is not deviated, not enlarged, not fixed and not tender  Cervix exhibits no motion tenderness, no discharge and no friability  Right adnexum displays no mass, no tenderness and no fullness  Left adnexum displays no mass, no tenderness and no fullness  No erythema or tenderness in the vagina  No foreign body in the vagina  No signs of injury around the vagina  CURDLIKE Vaginal discharge found mixed with menstrual blood  Perineum and anus without areas of injury  No lesions noted or swelling  Lymphadenopathy:        Right: No inguinal adenopathy present  Left: No inguinal adenopathy present

## 2023-01-11 NOTE — PATIENT INSTRUCTIONS
Vaginal Discharge   WHAT YOU NEED TO KNOW:   Vaginal discharge is normal  It is usually clear or white and odorless  Vaginal discharge is your body's way of cleaning your vagina so it is healthy  Irritation, itching, burning, or a change in the amount, smell, or color may indicate a problem  DISCHARGE INSTRUCTIONS:   Contact your healthcare provider or gynecologist if:   You have swelling, burning, itching, or irritation in or around your vagina  You have an increase in the amount of discharge  The color or smell of your discharge changes  Your discharge looks similar to cottage cheese  Your discharge is bloody and it is not your monthly period  You have pain during sexual intercourse  You have trouble urinating, or you urinate often and with urgency  You have abdominal pain or cramps  You have a fever or chills  You have low back pain or side pain  You have questions or concerns about your condition or care  Keep your vagina healthy:   Always wipe from front to back  after you use the toilet  This prevents spreading bacteria from your rectal area into your vagina  Clean in and around your vagina with mild soap and warm water each day  Gently dry the area after washing  Do not use hot tubs  The heat and moisture from hot tubs can increase your risk for another yeast infection  Do not wear tight-fitting clothes or undergarments  for long periods  Wear cotton underwear during the day  Cotton helps keep your genital area dry and does not hold in warmth or moisture  Do not wear underwear at night  Change your laundry soap or fabric softener  if you think it is irritating your skin  Do not douche  or use feminine hygiene sprays or bubble bath  Do not use pads or tampons that are scented, or colored or perfumed toilet paper  Ask your healthcare provider about birth control options if necessary  Condoms have latex and diaphragms have gel that kill sperm   Both of these may irritate your genital area  Follow up with your doctor as directed:  Write down your questions so you remember to ask them during your visits  © Copyright [employer] 2022 Information is for End User's use only and may not be sold, redistributed or otherwise used for commercial purposes  All illustrations and images included in CareNotes® are the copyrighted property of A D A M , Inc  or [facility] Maurisio Velasco   The above information is an  only  It is not intended as medical advice for individual conditions or treatments  Talk to your doctor, nurse or pharmacist before following any medical regimen to see if it is safe and effective for you

## 2023-01-13 LAB
C TRACH DNA SPEC QL NAA+PROBE: NEGATIVE
N GONORRHOEA DNA SPEC QL NAA+PROBE: NEGATIVE

## 2023-02-27 ENCOUNTER — ANNUAL EXAM (OUTPATIENT)
Dept: OBGYN CLINIC | Facility: CLINIC | Age: 48
End: 2023-02-27

## 2023-02-27 VITALS
BODY MASS INDEX: 30.32 KG/M2 | WEIGHT: 193.2 LBS | HEIGHT: 67 IN | SYSTOLIC BLOOD PRESSURE: 132 MMHG | DIASTOLIC BLOOD PRESSURE: 80 MMHG

## 2023-02-27 DIAGNOSIS — Z11.3 SCREEN FOR STD (SEXUALLY TRANSMITTED DISEASE): ICD-10-CM

## 2023-02-27 DIAGNOSIS — Z12.31 ENCOUNTER FOR SCREENING MAMMOGRAM FOR BREAST CANCER: ICD-10-CM

## 2023-02-27 DIAGNOSIS — Z01.419 ENCOUNTER FOR GYNECOLOGICAL EXAMINATION WITHOUT ABNORMAL FINDING: ICD-10-CM

## 2023-02-27 DIAGNOSIS — Z01.419 ENCOUNTER FOR ANNUAL ROUTINE GYNECOLOGICAL EXAMINATION: Primary | ICD-10-CM

## 2023-02-27 NOTE — PROGRESS NOTES
Assessment/Plan:    Encounter for gynecological examination without abnormal finding  STD testing given  Declined pelvic exam today  Menses regular  Had very heavy episode 2-3 months ago  Now improving will call if worsen  Options would be OC, ring, IUD, Depo, Ablation etc         Diagnoses and all orders for this visit:    Encounter for annual routine gynecological examination    Encounter for screening mammogram for breast cancer  -     Mammo screening bilateral w 3d & cad; Future    Screen for STD (sexually transmitted disease)  -     HIV 1/2 AG/AB w Reflex [facility] for 2 yr old and above; Future  -     RPR-Syphilis Screening (Total Syphilis IGG/IGM); Future  -     Hepatitis B surface antigen; Future  -     Hepatitis C antibody; Future  -     Chlamydia/GC amplified DNA by PCR; Future    Encounter for gynecological examination without abnormal finding          Subjective:      Patient ID: Kathie Ring is a 56 y o  female  Patient presents for a routine annual visit  Menarche- Y/O  Last Pap Smear-  22 neg/neg  LMP- 23  Birth control- tubal   Mammogram-22  Colonoscopy- 22 recall 10 years    Non smoker  Social drinker  Currently sexually active  Maternal aunt with breast cancer     Pt states her period began on Saturday  Defers pelvic exam if possible  Would like STD testing due to being in new relationship  Was told Gc/chl swab will come back invalid if collected while one menses  If can still do it, will get exam  If not, will go only for blood work panel  New relationship, Going well 2-3 months  Menses regular, heavy  Reviewed treatment options if interested  The following portions of the patient's history were reviewed and updated as appropriate:   She  has a past medical history of COVID-19 virus infection (3/19/2021), Headache(784 0), Migraine, Nasal turbinate hypertrophy, Tonsillitis, chronic, and Wears glasses    She   Patient Active Problem List    Diagnosis Date Noted   • Abscess of axilla, right 2022   • Acute midline low back pain without sciatica 2021   • Impaired fasting glucose 2021   • Hemorrhoids, external 2021   • S/P tonsillectomy 2020   • Plantar fasciitis of left foot 2019   • Hypertrophy of both inferior nasal turbinates 10/28/2019   • Other chronic diseases of tonsils and adenoids 10/28/2019   • Encounter for gynecological examination without abnormal finding 12/10/2018   • Prediabetes 2018   • Anemia 2017   • Irritable bowel syndrome with constipation 10/03/2017     She  has a past surgical history that includes  section; Appendectomy; Tubal ligation; TONSILLECTOMY (Bilateral, 2019); Turbinate resection (N/A, 2019); Mammo stereotactic breast biopsy left (all inc) (Left, 2020); and Breast biopsy (Left, 2020)  Her family history includes Breast cancer (age of onset: 39) in her maternal aunt; Cancer in her family and maternal grandmother; No Known Problems in her daughter, daughter, father, maternal aunt, maternal aunt, maternal grandfather, mother, paternal grandfather, and paternal grandmother  She  reports that she has never smoked  She has never used smokeless tobacco  She reports current alcohol use of about 1 0 - 2 0 standard drink per week  She reports that she does not use drugs  Current Outpatient Medications   Medication Sig Dispense Refill   • lubiprostone (AMITIZA) 24 mcg capsule Take 1 capsule (24 mcg total) by mouth 2 (two) times a day with meals 60 capsule 4   • nystatin (MYCOSTATIN) ointment Apply topically 2 (two) times a day for 28 days 30 g 1     No current facility-administered medications for this visit       Current Outpatient Medications on File Prior to Visit   Medication Sig   • lubiprostone (AMITIZA) 24 mcg capsule Take 1 capsule (24 mcg total) by mouth 2 (two) times a day with meals   • nystatin (MYCOSTATIN) ointment Apply topically 2 (two) times a day for 28 days     No current facility-administered medications on file prior to visit  She has No Known Allergies       Review of Systems   Constitutional: Negative for activity change, appetite change, chills, fatigue and fever  HENT: Negative for rhinorrhea, sneezing and sore throat  Eyes: Negative for visual disturbance  Respiratory: Negative for cough, shortness of breath and wheezing  Cardiovascular: Negative for chest pain, palpitations and leg swelling  Gastrointestinal: Negative for abdominal distention, constipation, diarrhea, nausea and vomiting  Genitourinary: Negative for difficulty urinating  Neurological: Negative for syncope and light-headedness           Objective:      /80 (BP Location: Left arm, Patient Position: Sitting, Cuff Size: Standard)   Ht 5' 7" (1 702 m)   Wt 87 6 kg (193 lb 3 2 oz)   LMP 02/25/2023 (Exact Date)   BMI 30 26 kg/m²          Physical Exam   DECLINED

## 2023-02-27 NOTE — ASSESSMENT & PLAN NOTE
STD testing given  Declined pelvic exam today  Menses regular  Had very heavy episode 2-3 months ago  Now improving will call if worsen      Options would be OC, ring, IUD, Depo, Ablation etc

## 2023-04-26 ENCOUNTER — OFFICE VISIT (OUTPATIENT)
Dept: FAMILY MEDICINE CLINIC | Facility: CLINIC | Age: 48
End: 2023-04-26

## 2023-04-26 ENCOUNTER — APPOINTMENT (OUTPATIENT)
Dept: RADIOLOGY | Facility: CLINIC | Age: 48
End: 2023-04-26

## 2023-04-26 VITALS
BODY MASS INDEX: 34.53 KG/M2 | OXYGEN SATURATION: 98 % | DIASTOLIC BLOOD PRESSURE: 78 MMHG | HEIGHT: 67 IN | TEMPERATURE: 98.5 F | WEIGHT: 220 LBS | HEART RATE: 74 BPM | SYSTOLIC BLOOD PRESSURE: 122 MMHG

## 2023-04-26 DIAGNOSIS — Z00.00 HEALTH MAINTENANCE EXAMINATION: Primary | ICD-10-CM

## 2023-04-26 DIAGNOSIS — R73.01 IMPAIRED FASTING GLUCOSE: ICD-10-CM

## 2023-04-26 DIAGNOSIS — M25.562 ACUTE PAIN OF LEFT KNEE: ICD-10-CM

## 2023-04-26 NOTE — PROGRESS NOTES
Name: Sai Babcock      : 1975      MRN: [id number]  Encounter Provider: Saeid Leo MD  Encounter Date: 2023   Encounter department: [address] [place] [address]     1  Health maintenance examination  See below    2  Acute pain of left knee  -     XR knee 3 vw left non injury; Future; Expected date: 2023  Recommend for her to take ibuprofen as needed    3  Impaired fasting glucose  -     Hemoglobin A1C; Future    Follow up as needed       Subjective     Patient is here for a yearly exam   She has been having left knee pain  No injuries her knee has been popping  Has pain when twisting and getting up to a standing position  Has pre-diabetes  Review of Systems   Constitutional: Negative for activity change, appetite change, fatigue and fever  HENT: Negative for congestion and ear discharge  Respiratory: Negative for cough and shortness of breath  Cardiovascular: Negative for chest pain and palpitations  Gastrointestinal: Negative for diarrhea and nausea  Musculoskeletal: Positive for arthralgias and myalgias  Negative for back pain  Skin: Negative for color change and rash  Neurological: Negative for dizziness and headaches  Psychiatric/Behavioral: Negative for agitation and behavioral problems         Past Medical History:   Diagnosis Date   • COVID-19 virus infection 3/19/2021   • Headache(784 0)    • Migraine    • Nasal turbinate hypertrophy     OR correction today 2019   • Tonsillitis, chronic    • Wears glasses      Past Surgical History:   Procedure Laterality Date   • APPENDECTOMY     • BREAST BIOPSY Left 2020    benign   •  SECTION     • MAMMO STEREOTACTIC BREAST BIOPSY LEFT (ALL INC) Left 2020   • TONSILLECTOMY Bilateral 2019    Procedure: TONSILLECTOMY;  Surgeon: Ricardo Delatorre DO;  [facility];  Service: ENT   • TUBAL LIGATION     • TURBINATE RESECTION N/A 2019    Procedure: PARTIAL INFERIOR TURBINECTOMY WITH OUTFRACTURE;  Surgeon: Rosalinda Sinclair DO;  [facility];  Service: ENT     Family History   Problem Relation Age of Onset   • No Known Problems Mother    • No Known Problems Father    • No Known Problems Daughter    • No Known Problems Daughter    • Cancer Maternal Grandmother    • No Known Problems Maternal Grandfather    • No Known Problems Paternal Grandmother    • No Known Problems Paternal Grandfather    • No Known Problems Maternal Aunt    • Breast cancer Maternal Aunt 39   • No Known Problems Maternal Aunt    • Cancer Family    • BRCA2 Positive Neg Hx    • BRCA2 Negative Neg Hx    • BRCA1 Positive Neg Hx    • BRCA1 Negative Neg Hx    • Ovarian cancer Neg Hx    • Colon cancer Neg Hx      Social History     Socioeconomic History   • Marital status: /Civil Union     Spouse name: Not on file   • Number of children: Not on file   • Years of education: Not on file   • Highest education level: Not on file   Occupational History   • Not on file   Tobacco Use   • Smoking status: Never   • Smokeless tobacco: Never   Vaping Use   • Vaping Use: Never used   Substance and Sexual Activity   • Alcohol use:  Yes     Alcohol/week: 1 0 - 2 0 standard drink     Types: 1 - 2 Glasses of wine per week     Comment: Social    • Drug use: No   • Sexual activity: Yes     Partners: Male     Birth control/protection: Female Sterilization   Other Topics Concern   • Not on file   Social History Narrative   • Not on file     Social Determinants of Health     Financial Resource Strain: Not on file   Food Insecurity: Not on file   Transportation Needs: Not on file   Physical Activity: Not on file   Stress: Not on file   Social Connections: Not on file   Intimate Partner Violence: Not on file   Housing Stability: Not on file     Current Outpatient Medications on File Prior to Visit   Medication Sig   • lubiprostone (AMITIZA) 24 mcg capsule Take 1 capsule (24 mcg total) by mouth 2 (two) times a day with meals "  • nystatin (MYCOSTATIN) ointment Apply topically 2 (two) times a day for 28 days     No Known Allergies  Immunization History   Administered Date(s) Administered   • COVID-19 PFIZER VACCINE 0 3 ML IM 08/24/2021, 09/14/2021   • INFLUENZA 11/09/2021   • Influenza, injectable, quadrivalent, preservative free 0 5 mL 11/24/2020       Objective     /78 (BP Location: Left arm, Patient Position: Sitting, Cuff Size: Large)   Pulse 74   Temp 98 5 °F (36 9 °C)   Ht 5' 7\" (1 702 m)   Wt 99 8 kg (220 lb)   SpO2 98%   BMI 34 46 kg/m²     Physical Exam  Constitutional:       General: She is not in acute distress  Appearance: She is well-developed  She is not diaphoretic  HENT:      Head: Normocephalic and atraumatic  Nose: Nose normal    Eyes:      Conjunctiva/sclera: Conjunctivae normal       Pupils: Pupils are equal, round, and reactive to light  Cardiovascular:      Rate and Rhythm: Normal rate and regular rhythm  Heart sounds: Normal heart sounds  No murmur heard  Pulmonary:      Effort: Pulmonary effort is normal  No respiratory distress  Breath sounds: Normal breath sounds  No wheezing  Abdominal:      General: Bowel sounds are normal  There is no distension  Palpations: Abdomen is soft  Tenderness: There is no abdominal tenderness  Musculoskeletal:         General: Tenderness present  Comments: Left leg roll test produces left knee medial aspect tenderness     Skin:     General: Skin is warm and dry  Findings: No erythema or rash  Neurological:      Mental Status: She is alert and oriented to person, place, and time         Myrtle Snowden MD  "

## 2023-06-05 ENCOUNTER — OFFICE VISIT (OUTPATIENT)
Dept: OBGYN CLINIC | Facility: CLINIC | Age: 48
End: 2023-06-05
Payer: COMMERCIAL

## 2023-06-05 VITALS
SYSTOLIC BLOOD PRESSURE: 126 MMHG | DIASTOLIC BLOOD PRESSURE: 74 MMHG | HEIGHT: 67 IN | WEIGHT: 222.8 LBS | BODY MASS INDEX: 34.97 KG/M2

## 2023-06-05 DIAGNOSIS — N92.1 MENORRHAGIA WITH IRREGULAR CYCLE: Primary | ICD-10-CM

## 2023-06-05 PROCEDURE — 99214 OFFICE O/P EST MOD 30 MIN: CPT | Performed by: OBSTETRICS & GYNECOLOGY

## 2023-06-05 NOTE — PROGRESS NOTES
Assessment/Plan:    Menorrhagia with irregular cycle  Options discussed:  Ibuprofen, nutritional support  Lysteda  OC, depoprovera, etc  IUD with progestin  D&C Novasure  Hysterectomy    Safe and effective use of medication discussed  Surgery- expected recovery and outcomes briefly reviewed  Will begin with labs cbc, tsh and ultrasound  Will call with results       Diagnoses and all orders for this visit:    Menorrhagia with irregular cycle  -     CBC and differential; Future  -     TSH, 3rd generation with Free T4 reflex; Future  -     US pelvis complete w transvaginal; Future          Subjective:      Patient ID: Yemi Boone is a 56 y o  female  Patient here for follow up from annual 2/27/23  Discussed options for heavy menses at this time  Still getting heavy episodes, periods are irregular roughly once per month but start day is variable, and very crampy  Would like pelvic exam today due to declining at annual visit (on menses at that time)     Was also given blood work orders for STD testing  These orders are still active  Pt will go when she has free time  Menses last 4-5 days at a time  No light days  Has to wear period briefs/underwear to prevent leaking  Last imaging over 3 years ago, within [facility]  At that time normal size uterus, small ovarian cyst  Personally reviewed by me  Gynecologic Exam  She complains of pelvic pain and vaginal bleeding  This is a recurrent problem  The current episode started more than 1 year ago  The problem occurs intermittently  The problem has been gradually worsening since onset  The pain is moderate  The problem affects both sides  Associated symptoms include back pain  Pertinent negatives include no chills, constipation, diarrhea, fever, nausea, sore throat or vomiting  The vaginal bleeding is heavier than menses  Patient has been passing clots  Patient has not been passing tissue  The symptoms are aggravated by activity and tactile pressure  Past treatments include heating pads and NSAIDs  The treatment provided mild relief  She is sexually active  The patient's menstrual history has been irregular  The following portions of the patient's history were reviewed and updated as appropriate:   She  has a past medical history of COVID-19 virus infection (3/19/2021), Headache(784 0), Migraine, Nasal turbinate hypertrophy, Tonsillitis, chronic, and Wears glasses  She   Patient Active Problem List    Diagnosis Date Noted   • Menorrhagia with irregular cycle 2023   • Health maintenance examination 2023   • Acute pain of left knee 2023   • Abscess of axilla, right 2022   • Acute midline low back pain without sciatica 2021   • Impaired fasting glucose 2021   • Hemorrhoids, external 2021   • S/P tonsillectomy 2020   • Plantar fasciitis of left foot 2019   • Hypertrophy of both inferior nasal turbinates 10/28/2019   • Other chronic diseases of tonsils and adenoids 10/28/2019   • Encounter for gynecological examination without abnormal finding 12/10/2018   • Prediabetes 2018   • Anemia 2017   • Irritable bowel syndrome with constipation 10/03/2017     She  has a past surgical history that includes  section; Appendectomy; Tubal ligation; TONSILLECTOMY (Bilateral, 2019); Turbinate resection (N/A, 2019); Mammo stereotactic breast biopsy left (all inc) (Left, 2020); and Breast biopsy (Left, 2020)  Her family history includes Breast cancer (age of onset: 39) in her maternal aunt; Cancer in her family and maternal grandmother; No Known Problems in her daughter, daughter, father, maternal aunt, maternal aunt, maternal grandfather, mother, paternal grandfather, and paternal grandmother  She  reports that she has never smoked  She has never used smokeless tobacco  She reports current alcohol use of about 1 0 - 2 0 standard drink of alcohol per week   She reports that she does "not use drugs  Current Outpatient Medications   Medication Sig Dispense Refill   • lubiprostone (AMITIZA) 24 mcg capsule Take 1 capsule (24 mcg total) by mouth 2 (two) times a day with meals 60 capsule 4   • nystatin (MYCOSTATIN) ointment Apply topically 2 (two) times a day for 28 days 30 g 1     No current facility-administered medications for this visit  Current Outpatient Medications on File Prior to Visit   Medication Sig   • lubiprostone (AMITIZA) 24 mcg capsule Take 1 capsule (24 mcg total) by mouth 2 (two) times a day with meals   • nystatin (MYCOSTATIN) ointment Apply topically 2 (two) times a day for 28 days     No current facility-administered medications on file prior to visit  She has No Known Allergies       Review of Systems   Constitutional: Positive for activity change and fatigue  Negative for appetite change, chills and fever  HENT: Negative for rhinorrhea, sneezing and sore throat  Eyes: Negative for visual disturbance  Respiratory: Negative for cough, shortness of breath and wheezing  Cardiovascular: Negative for chest pain, palpitations and leg swelling  Gastrointestinal: Negative for abdominal distention, constipation, diarrhea, nausea and vomiting  Genitourinary: Positive for menstrual problem and pelvic pain  Negative for difficulty urinating  Musculoskeletal: Positive for back pain  Neurological: Negative for syncope and light-headedness  Objective:      /74 (BP Location: Left arm, Patient Position: Sitting, Cuff Size: Standard)   Ht 5' 7\" (1 702 m)   Wt 101 kg (222 lb 12 8 oz)   LMP 05/25/2023 (Exact Date)   BMI 34 90 kg/m²          Physical Exam  Genitourinary:     Labia:         Right: No rash, tenderness or lesion  Left: No rash, tenderness or lesion  Vagina: Normal  No vaginal discharge, erythema or tenderness  Cervix: No cervical motion tenderness, discharge or friability  Uterus: Enlarged (10 week size) and tender   " Not deviated and not fixed  Adnexa:         Right: No mass, tenderness or fullness  Left: No mass, tenderness or fullness

## 2023-06-05 NOTE — ASSESSMENT & PLAN NOTE
Options discussed:  Ibuprofen, nutritional support  Lysteda  OC, depoprovera, etc  IUD with progestin  D&C Novasure  Hysterectomy    Safe and effective use of medication discussed  Surgery- expected recovery and outcomes briefly reviewed  Will begin with labs cbc, tsh and ultrasound     Will call with results

## 2023-06-05 NOTE — PATIENT INSTRUCTIONS
Menorrhagia   AMBULATORY CARE:   Menorrhagia  is heavy menstrual bleeding for more than 7 days or severe menstrual bleeding for less than 7 days  Your menstrual bleeding and cramping are so heavy that you have trouble doing your usual daily activities  Your monthly period may also occur more often, and you may bleed between periods  Menorrhagia is common in adolescence and around menopause  Common symptoms include the following:   Soaking a pad or tampon every 1 to 2 hours    Using both a pad and a tampon    Waking up at night to change your pad or tampon    Blood clots with your bleeding for more than 1 day    Abdominal pain or cramps    Call your local emergency number (911 in the [address] [facility]) for any of the following: You have chest pain and shortness of breath  Your heart is fluttering or beating faster than usual for you  Seek care immediately if:   You feel dizzy when you stand  You feel confused  You have severe abdominal pain, nausea, and vomiting  Your skin or the whites of your eyes turn yellow  Call your doctor or gynecologist if:   You need to change your pad or tampon more than 1 time per hour, for several hours in a row  You feel more weak and tired than usual     You have new coldness in your hands and feet  You have questions or concerns about your condition or care  Medicines: You may need any of the following:  Iron supplements  may be given if your blood iron level decreases because of heavy bleeding  NSAIDs , such as ibuprofen, help decrease swelling, pain, and fever  NSAIDs can cause stomach bleeding or kidney problems in certain people  If you take blood thinner medicine, always ask your healthcare provider if NSAIDs are safe for you  Always read the medicine label and follow directions  Hormones  help slow or stop your bleeding and make your monthly periods more regular  This medicine may be given as birth control pills or an intrauterine device (IUD)      Take your medicine as directed  Contact your healthcare provider if you think your medicine is not helping or if you have side effects  Tell your provider if you are allergic to any medicine  Keep a list of the medicines, vitamins, and herbs you take  Include the amounts, and when and why you take them  Bring the list or the pill bottles to follow-up visits  Carry your medicine list with you in case of an emergency  Manage your symptoms:   Keep a supply of pads or tampons with you at all times  If possible, stay close to a bathroom  Apply heat  on your abdomen to decrease pain and cramps  You can use a heating pad on a low setting  Apply heat for 20 to 30 minutes every 2 hours for as many days as directed  Follow up with your doctor or gynecologist as directed: You may need regular pelvic exams with Pap smears to monitor your condition  Write down your questions so you remember to ask them during your visits  © Copyright [employer] 2022 Information is for End User's use only and may not be sold, redistributed or otherwise used for commercial purposes  The above information is an  only  It is not intended as medical advice for individual conditions or treatments  Talk to your doctor, nurse or pharmacist before following any medical regimen to see if it is safe and effective for you

## 2023-06-14 ENCOUNTER — HOSPITAL ENCOUNTER (OUTPATIENT)
Dept: ULTRASOUND IMAGING | Facility: HOSPITAL | Age: 48
Discharge: HOME | End: 2023-06-14
Attending: OBSTETRICS & GYNECOLOGY
Payer: COMMERCIAL

## 2023-06-14 ENCOUNTER — APPOINTMENT (OUTPATIENT)
Dept: LAB | Facility: HOSPITAL | Age: 48
End: 2023-06-14
Payer: COMMERCIAL

## 2023-06-14 ENCOUNTER — APPOINTMENT (OUTPATIENT)
Dept: LAB | Facility: HOSPITAL | Age: 48
End: 2023-06-14
Attending: OBSTETRICS & GYNECOLOGY
Payer: COMMERCIAL

## 2023-06-14 DIAGNOSIS — N92.1 MENORRHAGIA WITH IRREGULAR CYCLE: ICD-10-CM

## 2023-06-14 DIAGNOSIS — R73.01 IMPAIRED FASTING GLUCOSE: ICD-10-CM

## 2023-06-14 DIAGNOSIS — Z11.3 SCREEN FOR STD (SEXUALLY TRANSMITTED DISEASE): ICD-10-CM

## 2023-06-14 DIAGNOSIS — Z00.8 HEALTH EXAMINATION IN POPULATION SURVEY: ICD-10-CM

## 2023-06-14 LAB
BASOPHILS # BLD AUTO: 0.06 THOUSANDS/ÂΜL (ref 0–0.1)
BASOPHILS NFR BLD AUTO: 1 % (ref 0–1)
CHOLEST SERPL-MCNC: 176 MG/DL
EOSINOPHIL # BLD AUTO: 0.18 THOUSAND/ÂΜL (ref 0–0.61)
EOSINOPHIL NFR BLD AUTO: 2 % (ref 0–6)
ERYTHROCYTE [DISTWIDTH] IN BLOOD BY AUTOMATED COUNT: 18.7 % (ref 11.6–15.1)
HBV SURFACE AG SER QL: NORMAL
HCT VFR BLD AUTO: 33.4 % (ref 34.8–46.1)
HCV AB SER QL: NORMAL
HDLC SERPL-MCNC: 62 MG/DL
HGB BLD-MCNC: 10 G/DL (ref 11.5–15.4)
HIV 1+2 AB+HIV1 P24 AG SERPL QL IA: NORMAL
HIV 2 AB SERPL QL IA: NORMAL
HIV1 AB SERPL QL IA: NORMAL
HIV1 P24 AG SERPL QL IA: NORMAL
IMM GRANULOCYTES # BLD AUTO: 0.03 THOUSAND/UL (ref 0–0.2)
IMM GRANULOCYTES NFR BLD AUTO: 0 % (ref 0–2)
LDLC SERPL CALC-MCNC: 103 MG/DL (ref 0–100)
LYMPHOCYTES # BLD AUTO: 2.31 THOUSANDS/ÂΜL (ref 0.6–4.47)
LYMPHOCYTES NFR BLD AUTO: 24 % (ref 14–44)
MCH RBC QN AUTO: 21.1 PG (ref 26.8–34.3)
MCHC RBC AUTO-ENTMCNC: 29.9 G/DL (ref 31.4–37.4)
MCV RBC AUTO: 71 FL (ref 82–98)
MONOCYTES # BLD AUTO: 0.83 THOUSAND/ÂΜL (ref 0.17–1.22)
MONOCYTES NFR BLD AUTO: 9 % (ref 4–12)
NEUTROPHILS # BLD AUTO: 6.06 THOUSANDS/ÂΜL (ref 1.85–7.62)
NEUTS SEG NFR BLD AUTO: 64 % (ref 43–75)
NONHDLC SERPL-MCNC: 114 MG/DL
NRBC BLD AUTO-RTO: 0 /100 WBCS
PLATELET # BLD AUTO: 280 THOUSANDS/UL (ref 149–390)
PMV BLD AUTO: 10.2 FL (ref 8.9–12.7)
RBC # BLD AUTO: 4.74 MILLION/UL (ref 3.81–5.12)
TREPONEMA PALLIDUM IGG+IGM AB [PRESENCE] IN SERUM OR PLASMA BY IMMUNOASSAY: NORMAL
TRIGL SERPL-MCNC: 57 MG/DL
TSH SERPL DL<=0.05 MIU/L-ACNC: 3.24 UIU/ML (ref 0.45–4.5)
WBC # BLD AUTO: 9.47 THOUSAND/UL (ref 4.31–10.16)

## 2023-06-14 PROCEDURE — 84443 ASSAY THYROID STIM HORMONE: CPT

## 2023-06-14 PROCEDURE — 87389 HIV-1 AG W/HIV-1&-2 AB AG IA: CPT

## 2023-06-14 PROCEDURE — 86803 HEPATITIS C AB TEST: CPT

## 2023-06-14 PROCEDURE — 76830 TRANSVAGINAL US NON-OB: CPT

## 2023-06-14 PROCEDURE — 83036 HEMOGLOBIN GLYCOSYLATED A1C: CPT

## 2023-06-14 PROCEDURE — 36415 COLL VENOUS BLD VENIPUNCTURE: CPT

## 2023-06-14 PROCEDURE — 87340 HEPATITIS B SURFACE AG IA: CPT

## 2023-06-14 PROCEDURE — 86780 TREPONEMA PALLIDUM: CPT

## 2023-06-14 PROCEDURE — 80061 LIPID PANEL: CPT

## 2023-06-14 PROCEDURE — 85025 COMPLETE CBC W/AUTO DIFF WBC: CPT

## 2023-06-14 PROCEDURE — 76856 US EXAM PELVIC COMPLETE: CPT

## 2023-06-14 PROCEDURE — 87491 CHLMYD TRACH DNA AMP PROBE: CPT

## 2023-06-14 PROCEDURE — 87591 N.GONORRHOEAE DNA AMP PROB: CPT

## 2023-06-15 LAB
C TRACH DNA SPEC QL NAA+PROBE: NEGATIVE
EST. AVERAGE GLUCOSE BLD GHB EST-MCNC: 128 MG/DL
HBA1C MFR BLD: 6.1 %
N GONORRHOEA DNA SPEC QL NAA+PROBE: NEGATIVE

## 2023-06-25 PROBLEM — Z00.00 HEALTH MAINTENANCE EXAMINATION: Status: RESOLVED | Noted: 2023-04-26 | Resolved: 2023-06-25

## 2023-08-03 ENCOUNTER — OFFICE VISIT (OUTPATIENT)
Dept: BARIATRICS | Facility: CLINIC | Age: 48
End: 2023-08-03
Payer: COMMERCIAL

## 2023-08-03 VITALS
HEART RATE: 76 BPM | OXYGEN SATURATION: 98 % | WEIGHT: 219.2 LBS | RESPIRATION RATE: 18 BRPM | BODY MASS INDEX: 36.52 KG/M2 | SYSTOLIC BLOOD PRESSURE: 130 MMHG | DIASTOLIC BLOOD PRESSURE: 78 MMHG | HEIGHT: 65 IN

## 2023-08-03 DIAGNOSIS — D50.0 IRON DEFICIENCY ANEMIA DUE TO CHRONIC BLOOD LOSS: ICD-10-CM

## 2023-08-03 DIAGNOSIS — R73.03 PREDIABETES: ICD-10-CM

## 2023-08-03 DIAGNOSIS — K58.1 IRRITABLE BOWEL SYNDROME WITH CONSTIPATION: ICD-10-CM

## 2023-08-03 DIAGNOSIS — E66.01 CLASS 2 SEVERE OBESITY DUE TO EXCESS CALORIES WITH SERIOUS COMORBIDITY AND BODY MASS INDEX (BMI) OF 36.0 TO 36.9 IN ADULT (HCC): Primary | ICD-10-CM

## 2023-08-03 PROCEDURE — 99203 OFFICE O/P NEW LOW 30 MIN: CPT | Performed by: FAMILY MEDICINE

## 2023-08-03 NOTE — PROGRESS NOTES
Assessment/Plan:  Mere Saez was seen today for consult. Diagnoses and all orders for this visit:    Class 2 severe obesity due to excess calories with serious comorbidity and body mass index (BMI) of 36.0 to 36.9 in adult [facility])  See bellow dietary recs  Not a candidate for surgery  Discussed AOM - would like to avoid now  Prediabetes  disucssed GLP-1 but now she has anemia and needs to solve that problem first before starting medications  Avoid Metformin due to anemia  Irritable bowel syndrome with constipation  Fiber intake and water intake advised  Iron deficiency anemia due to chronic blood loss  Advised oral iron that is formulated to avoid constipation     Component Ref Range & Units 6/14/23  7:09 AM 9/8/22  1:25 PM   TSH 3RD GENERATON 0.450 - 4.500 uIU/mL 3.244  1.274        Obesity:   Weight not at goal and patient tried more than 6 months to lose weight and was not able to achieve a meaningful weight loss above 5%  - Discussed options of HealthyCORE-Intensive Lifestyle Intervention Program, Very Low Calorie Diet-VLCD and Conservative Program and the role of weight loss medications. - Patient is interested in pursuing Conservative Program  - Initial weight loss goal of 5-10% weight loss for improved health  - Weight loss can improve patient's co-morbid conditions and/or prevent weight-related complications. Meet dietician: agrees   • Calorie goal handouts provided:    • Motivational interview performed and patient noted changes to work on until next visit  • Hydration: 64oz fluid, no sugary drinks  • Goal 3 meals per day  • Food log encouraged , phone laura or paper journal  • Increase physical activity by 10 minutes daily. Patient denies personal and family history of  pancreatitis, thyroid cancer, MEN-2 tumors. Denies any hx of glaucoma, seizures, kidney stones . Denies Hx of CAD, PAD, palpitations, arrhythmia.    Denies uncontrolled anxiety or depression, suicidal behavior or thinking , insomnia or sleep disturbance. Total time spent:  40 min, with >50% face-to-face time spent counseling patient on nonsurgical interventions for the treatment of excess weight. Discussed in detail nonsurgical options including intensive lifestyle intervention program, very low-calorie diet program and conservative program.  Discussed the role of weight loss medications. Counseled patient on diet behavior and exercise modification for weight loss. Return in     Subjective:   Chief Complaint   Patient presents with   • Consult     Initial Consultation with Cristal Thurston. SB= /8. Waist:        Patient ID: Gunnar Olsen  is a 56 y.o. female with excess weight/obesity here to pursue weight management. Previous notes and records have been reviewed. HPI  Wt Readings from Last 20 Encounters:   08/03/23 99.4 kg (219 lb 3.2 oz)   06/05/23 101 kg (222 lb 12.8 oz)   04/26/23 99.8 kg (220 lb)   02/27/23 87.6 kg (193 lb 3.2 oz)   01/11/23 97.5 kg (215 lb)   12/12/22 96.6 kg (213 lb)   11/30/22 96.6 kg (213 lb)   09/28/22 95.7 kg (211 lb)   09/20/22 96.9 kg (213 lb 9.6 oz)   08/30/22 97.5 kg (215 lb)   02/21/22 95.3 kg (210 lb)   02/17/22 95.7 kg (211 lb)   02/14/22 95.7 kg (211 lb)   11/17/21 95.3 kg (210 lb)   10/28/21 97.1 kg (214 lb)   07/30/21 93.8 kg (206 lb 12.8 oz)   07/02/21 94.3 kg (208 lb)   02/23/21 97.5 kg (215 lb)   02/05/21 94.8 kg (209 lb)   12/10/20 95.6 kg (210 lb 12.8 oz)     Obesity/Excess Weight:Body mass index is 36.48 kg/m². 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  Sleep: not well  STOP ban/8    Past Medical History:   Diagnosis Date   • COVID-19 virus infection 3/19/2021   • Headache(784.0)    • Migraine    • Nasal turbinate hypertrophy     OR correction today 2019   • Tonsillitis, chronic    • Wears glasses      Past Surgical History:   Procedure Laterality Date   • APPENDECTOMY     • BREAST BIOPSY Left 2020    benign   •  SECTION     • MAMMO STEREOTACTIC BREAST BIOPSY LEFT (ALL INC) Left 2020   • TONSILLECTOMY Bilateral 2019    Procedure: TONSILLECTOMY;  Surgeon: Lonnie Pate DO;  Location: [facility];  Service: ENT   • TUBAL LIGATION     • TURBINATE RESECTION N/A 2019    Procedure: PARTIAL INFERIOR TURBINECTOMY WITH OUTFRACTURE;  Surgeon: Lonnie Pate DO;  Location: [facility];  Service: ENT     The following portions of the patient's history were reviewed and updated as appropriate: allergies, current medications, past family history, past medical history, past social history, past surgical history, and problem list.    ROS:  Review of Systems   Constitutional: Negative for activity change. Fatigue  HENT: Negative for trouble swallowing. Gastrointestinal: Negative for abdominal pain, nausea and vomiting, acid reflux, constipation/diarrhea  Endocrine: menorrhagia   Psychiatric/Behavioral: Negative for behavioral problems including anxiety /depression  Objective:  /78 (BP Location: Left arm, Patient Position: Sitting, Cuff Size: Adult)   Pulse 76   Resp 18   Ht 5' 5" (1.651 m)   Wt 99.4 kg (219 lb 3.2 oz)   SpO2 98%   BMI 36.48 kg/m²   Constitutional: Well-developed, well-nourished and Obese Body mass index is 36.48 kg/m². Sergio Baas Alert, cooperative. HEENT: No conjunctival injection. Pulmonary: No increased work of breathing or signs of respiratory distress. Clear respiratory sounds.   CV: Well-perfused, Regular rate and rhythm   Vascular: no peripheral edema  GI: Abdomen obese, Non-distended   MSK: no sarcopenia noted   Neuro: Oriented to person, place and time. Normal Speech. Normal gait. Psych: Normal affect and mood. Normal thought process, no delusions     Labs and Imaging  Recent labs and imaging have been personally reviewed.   Lab Results   Component Value Date    WBC 9.47 06/14/2023    HGB 10.0 (L) 06/14/2023    HCT 33.4 (L) 06/14/2023    MCV 71 (L) 06/14/2023     06/14/2023     Lab Results   Component Value Date    SODIUM 141 11/30/2022    K 3.9 11/30/2022     (H) 11/30/2022    CO2 22 11/30/2022    AGAP 8 11/30/2022    BUN 12 11/30/2022    CREATININE 0.83 11/30/2022    GLUF 111 (H) 11/30/2022    CALCIUM 9.1 11/30/2022    AST 9 11/30/2022    ALT 13 11/30/2022    ALKPHOS 84 11/30/2022    TP 7.3 11/30/2022    TBILI 0.23 11/30/2022    EGFR 84 11/30/2022     Lab Results   Component Value Date    HGBA1C 6.1 (H) 06/14/2023     Lab Results   Component Value Date    ASD7QVCGCWIH 3.244 06/14/2023     Lab Results   Component Value Date    CHOLESTEROL 176 06/14/2023     Lab Results   Component Value Date    HDL 62 06/14/2023     Lab Results   Component Value Date    TRIG 57 06/14/2023     Lab Results   Component Value Date    LDLCALC 103 (H) 06/14/2023

## 2023-09-07 DIAGNOSIS — M79.641 PAIN IN BOTH HANDS: Primary | ICD-10-CM

## 2023-09-07 DIAGNOSIS — M25.50 ARTHRALGIA, UNSPECIFIED JOINT: ICD-10-CM

## 2023-09-07 DIAGNOSIS — M79.642 PAIN IN BOTH HANDS: Primary | ICD-10-CM

## 2023-09-18 ENCOUNTER — OFFICE VISIT (OUTPATIENT)
Dept: OBGYN CLINIC | Facility: CLINIC | Age: 48
End: 2023-09-18
Payer: COMMERCIAL

## 2023-09-18 VITALS — SYSTOLIC BLOOD PRESSURE: 122 MMHG | BODY MASS INDEX: 37.94 KG/M2 | WEIGHT: 228 LBS | DIASTOLIC BLOOD PRESSURE: 78 MMHG

## 2023-09-18 DIAGNOSIS — N92.1 MENORRHAGIA WITH IRREGULAR CYCLE: ICD-10-CM

## 2023-09-18 DIAGNOSIS — Z32.02 NEGATIVE PREGNANCY TEST: Primary | ICD-10-CM

## 2023-09-18 LAB — SL AMB POCT URINE HCG: NORMAL

## 2023-09-18 PROCEDURE — 99214 OFFICE O/P EST MOD 30 MIN: CPT | Performed by: OBSTETRICS & GYNECOLOGY

## 2023-09-18 PROCEDURE — 81025 URINE PREGNANCY TEST: CPT | Performed by: OBSTETRICS & GYNECOLOGY

## 2023-09-18 PROCEDURE — 88305 TISSUE EXAM BY PATHOLOGIST: CPT | Performed by: PATHOLOGY

## 2023-09-18 PROCEDURE — 58100 BIOPSY OF UTERUS LINING: CPT | Performed by: OBSTETRICS & GYNECOLOGY

## 2023-09-18 NOTE — H&P (VIEW-ONLY)
Assessment/Plan:    Menorrhagia with irregular cycle  Treatment options for menorrhagia discussed:    Observation with treatment of anemia, optimize nutrition  Medications: hormones ( pill, IUD, injection, ring, patch, implant, combined and progestin only) and TXA  Surgery: D&C, Hysteroscopy, Ablation, Hysterectomy    After review of each, focused on IUD vs ablation. Malina Carrasco is not interested in any hormonal options at this time. A component of her symptoms may be adenomyosis and a hormonal treatment may be beneficial.     EB completed. Plan for [facility]  Hysteroscopy with Novasure Ablation           Diagnoses and all orders for this visit:    Negative pregnancy test  -     POCT urine HCG    Menorrhagia with irregular cycle  -     Tissue Exam    Other orders  -     Endometrial biopsy          Subjective:      Patient ID: Scot Ayala is a 56 y.o. female. Patient present to follow up on ultrasound results. Discuss next steps with provider    48yo G4P 3 0 1 3 with menorrhagia. Several visits have been dedicated to this and discussing available treatment options. Recent imaging shows normal sized uterus and normal adnexa. Here to review options again. Predominant symptoms is heavy regular menses. 4-5 days gushing. Some pain but controlled by heat or OTC pain meds. + fatigue + anemia treated with iron/ healthy diet. Interested in non hormonal options. Reviewed ablation, lysteda and did review IUD -mirena    Gynecologic Exam  She complains of vaginal bleeding. She reports no genital itching, genital lesions, genital odor, genital rash, pelvic pain or vaginal discharge. This is a chronic problem. The current episode started more than 1 year ago. The problem occurs intermittently. The problem has been gradually worsening since onset. The patient is experiencing no pain.  Pertinent negatives include no chills, constipation, diarrhea, dysuria, fever, flank pain, frequency, headaches, hematuria, nausea, painful intercourse, sore throat, urgency or vomiting. The vaginal bleeding is typical of menses. Patient has been passing clots. Patient has not been passing tissue. The symptoms are aggravated by activity. Past treatments include acetaminophen, heating pads and NSAIDs. The treatment provided mild relief. She is sexually active. The patient's menstrual history has been regular. The following portions of the patient's history were reviewed and updated as appropriate:   She  has a past medical history of COVID-19 virus infection (3/19/2021), Headache(784.0), Migraine, Nasal turbinate hypertrophy, Tonsillitis, chronic, and Wears glasses. She   Patient Active Problem List    Diagnosis Date Noted   • Menorrhagia with irregular cycle 2023   • Acute pain of left knee 2023   • Abscess of axilla, right 2022   • Acute midline low back pain without sciatica 2021   • Impaired fasting glucose 2021   • Hemorrhoids, external 2021   • S/P tonsillectomy 2020   • Plantar fasciitis of left foot 2019   • Hypertrophy of both inferior nasal turbinates 10/28/2019   • Other chronic diseases of tonsils and adenoids 10/28/2019   • Encounter for gynecological examination without abnormal finding 12/10/2018   • Prediabetes 2018   • Anemia 2017   • Irritable bowel syndrome with constipation 10/03/2017     She  has a past surgical history that includes  section; Appendectomy; Tubal ligation; TONSILLECTOMY (Bilateral, 2019); Turbinate resection (N/A, 2019); Mammo stereotactic breast biopsy left (all inc) (Left, 2020); and Breast biopsy (Left, 2020). Her family history includes Breast cancer (age of onset: 39) in her maternal aunt; Cancer in her family and maternal grandmother; No Known Problems in her daughter, daughter, father, maternal aunt, maternal aunt, maternal grandfather, mother, paternal grandfather, and paternal grandmother.   She  reports that she has never smoked. She has never used smokeless tobacco. She reports current alcohol use of about 1.0 - 2.0 standard drink of alcohol per week. She reports that she does not use drugs. Current Outpatient Medications   Medication Sig Dispense Refill   • lubiprostone (AMITIZA) 24 mcg capsule Take 1 capsule (24 mcg total) by mouth 2 (two) times a day with meals 60 capsule 4   • nystatin (MYCOSTATIN) ointment Apply topically 2 (two) times a day for 28 days 30 g 1     No current facility-administered medications for this visit. Current Outpatient Medications on File Prior to Visit   Medication Sig   • lubiprostone (AMITIZA) 24 mcg capsule Take 1 capsule (24 mcg total) by mouth 2 (two) times a day with meals   • nystatin (MYCOSTATIN) ointment Apply topically 2 (two) times a day for 28 days     No current facility-administered medications on file prior to visit. She has No Known Allergies. .    Review of Systems   Constitutional: Positive for activity change and fatigue. Negative for appetite change, chills and fever. HENT: Negative for rhinorrhea, sneezing and sore throat. Eyes: Negative for visual disturbance. Respiratory: Negative for cough, shortness of breath and wheezing. Cardiovascular: Negative for chest pain, palpitations and leg swelling. Gastrointestinal: Negative for abdominal distention, constipation, diarrhea, nausea and vomiting. Genitourinary: Positive for menstrual problem and vaginal bleeding. Negative for difficulty urinating, dyspareunia, dysuria, flank pain, frequency, hematuria, pelvic pain, urgency, vaginal discharge and vaginal pain. Neurological: Negative for syncope, light-headedness and headaches. Objective:      /78 (BP Location: Left arm, Patient Position: Sitting, Cuff Size: Standard)   Wt 103 kg (228 lb)   LMP 09/12/2023   BMI 37.94 kg/m²            Physical Exam  Constitutional:       General: She is not in acute distress.      Appearance: She is well-developed. She is not diaphoretic. Neck:      Vascular: No JVD. Cardiovascular:      Rate and Rhythm: Normal rate and regular rhythm. Heart sounds: Normal heart sounds. No murmur heard. No friction rub. No gallop. Pulmonary:      Effort: Pulmonary effort is normal. No respiratory distress. Breath sounds: Normal breath sounds. Abdominal:      General: Bowel sounds are normal. There is no distension. Palpations: Abdomen is soft. Tenderness: There is no abdominal tenderness. There is no guarding or rebound. Genitourinary:     Labia:         Right: No rash, tenderness or lesion. Left: No rash, tenderness or lesion. Vagina: Normal. No vaginal discharge, erythema or tenderness. Cervix: No cervical motion tenderness, discharge or friability. Uterus: Not deviated, not enlarged, not fixed and not tender. Adnexa:         Right: No mass, tenderness or fullness. Left: No mass, tenderness or fullness. Musculoskeletal:      Cervical back: Neck supple. Right lower leg: No edema. Left lower leg: No edema. Lymphadenopathy:      Cervical: No cervical adenopathy. Neurological:      Mental Status: She is alert and oriented to person, place, and time. Deep Tendon Reflexes: Reflexes are normal and symmetric. Endometrial biopsy    Date/Time: 9/18/2023 3:45 PM    Performed by: Tiana Eric MD  Authorized by: Tiana Eric MD  Universal Protocol:  Consent: Verbal consent obtained. Risks and benefits: risks, benefits and alternatives were discussed  Consent given by: patient  Patient understanding: patient states understanding of the procedure being performed      Indication:     Indications:  Other disorder of menstruation and other abnormal bleeding from female genital tract    Procedure:     Procedure: endometrial biopsy with Pipelle      A bivalve speculum was placed in the vagina: yes      Cervix cleaned and prepped: yes      The cervix was dilated: no      Uterus sounded: yes      Uterus sound depth (cm):  9    Specimen collected: specimen collected and sent to pathology      Patient tolerated procedure well with no complications: yes

## 2023-09-18 NOTE — PROGRESS NOTES
Assessment/Plan:    Menorrhagia with irregular cycle  Treatment options for menorrhagia discussed:    Observation with treatment of anemia, optimize nutrition  Medications: hormones ( pill, IUD, injection, ring, patch, implant, combined and progestin only) and TXA  Surgery: D&C, Hysteroscopy, Ablation, Hysterectomy    After review of each, focused on IUD vs ablation. Jasson Felder is not interested in any hormonal options at this time. A component of her symptoms may be adenomyosis and a hormonal treatment may be beneficial.     EB completed. Plan for [facility]  Hysteroscopy with Novasure Ablation           Diagnoses and all orders for this visit:    Negative pregnancy test  -     POCT urine HCG    Menorrhagia with irregular cycle  -     Tissue Exam    Other orders  -     Endometrial biopsy          Subjective:      Patient ID: Teri Baig is a 56 y.o. female. Patient present to follow up on ultrasound results. Discuss next steps with provider    46yo G4P 3 0 1 3 with menorrhagia. Several visits have been dedicated to this and discussing available treatment options. Recent imaging shows normal sized uterus and normal adnexa. Here to review options again. Predominant symptoms is heavy regular menses. 4-5 days gushing. Some pain but controlled by heat or OTC pain meds. + fatigue + anemia treated with iron/ healthy diet. Interested in non hormonal options. Reviewed ablation, lysteda and did review IUD -mirena    Gynecologic Exam  She complains of vaginal bleeding. She reports no genital itching, genital lesions, genital odor, genital rash, pelvic pain or vaginal discharge. This is a chronic problem. The current episode started more than 1 year ago. The problem occurs intermittently. The problem has been gradually worsening since onset. The patient is experiencing no pain.  Pertinent negatives include no chills, constipation, diarrhea, dysuria, fever, flank pain, frequency, headaches, hematuria, nausea, painful intercourse, sore throat, urgency or vomiting. The vaginal bleeding is typical of menses. Patient has been passing clots. Patient has not been passing tissue. The symptoms are aggravated by activity. Past treatments include acetaminophen, heating pads and NSAIDs. The treatment provided mild relief. She is sexually active. The patient's menstrual history has been regular. The following portions of the patient's history were reviewed and updated as appropriate:   She  has a past medical history of COVID-19 virus infection (3/19/2021), Headache(784.0), Migraine, Nasal turbinate hypertrophy, Tonsillitis, chronic, and Wears glasses. She   Patient Active Problem List    Diagnosis Date Noted   • Menorrhagia with irregular cycle 2023   • Acute pain of left knee 2023   • Abscess of axilla, right 2022   • Acute midline low back pain without sciatica 2021   • Impaired fasting glucose 2021   • Hemorrhoids, external 2021   • S/P tonsillectomy 2020   • Plantar fasciitis of left foot 2019   • Hypertrophy of both inferior nasal turbinates 10/28/2019   • Other chronic diseases of tonsils and adenoids 10/28/2019   • Encounter for gynecological examination without abnormal finding 12/10/2018   • Prediabetes 2018   • Anemia 2017   • Irritable bowel syndrome with constipation 10/03/2017     She  has a past surgical history that includes  section; Appendectomy; Tubal ligation; TONSILLECTOMY (Bilateral, 2019); Turbinate resection (N/A, 2019); Mammo stereotactic breast biopsy left (all inc) (Left, 2020); and Breast biopsy (Left, 2020). Her family history includes Breast cancer (age of onset: 39) in her maternal aunt; Cancer in her family and maternal grandmother; No Known Problems in her daughter, daughter, father, maternal aunt, maternal aunt, maternal grandfather, mother, paternal grandfather, and paternal grandmother.   She  reports that she has never smoked. She has never used smokeless tobacco. She reports current alcohol use of about 1.0 - 2.0 standard drink of alcohol per week. She reports that she does not use drugs. Current Outpatient Medications   Medication Sig Dispense Refill   • lubiprostone (AMITIZA) 24 mcg capsule Take 1 capsule (24 mcg total) by mouth 2 (two) times a day with meals 60 capsule 4   • nystatin (MYCOSTATIN) ointment Apply topically 2 (two) times a day for 28 days 30 g 1     No current facility-administered medications for this visit. Current Outpatient Medications on File Prior to Visit   Medication Sig   • lubiprostone (AMITIZA) 24 mcg capsule Take 1 capsule (24 mcg total) by mouth 2 (two) times a day with meals   • nystatin (MYCOSTATIN) ointment Apply topically 2 (two) times a day for 28 days     No current facility-administered medications on file prior to visit. She has No Known Allergies. .    Review of Systems   Constitutional: Positive for activity change and fatigue. Negative for appetite change, chills and fever. HENT: Negative for rhinorrhea, sneezing and sore throat. Eyes: Negative for visual disturbance. Respiratory: Negative for cough, shortness of breath and wheezing. Cardiovascular: Negative for chest pain, palpitations and leg swelling. Gastrointestinal: Negative for abdominal distention, constipation, diarrhea, nausea and vomiting. Genitourinary: Positive for menstrual problem and vaginal bleeding. Negative for difficulty urinating, dyspareunia, dysuria, flank pain, frequency, hematuria, pelvic pain, urgency, vaginal discharge and vaginal pain. Neurological: Negative for syncope, light-headedness and headaches. Objective:      /78 (BP Location: Left arm, Patient Position: Sitting, Cuff Size: Standard)   Wt 103 kg (228 lb)   LMP 09/12/2023   BMI 37.94 kg/m²            Physical Exam  Constitutional:       General: She is not in acute distress.      Appearance: She is well-developed. She is not diaphoretic. Neck:      Vascular: No JVD. Cardiovascular:      Rate and Rhythm: Normal rate and regular rhythm. Heart sounds: Normal heart sounds. No murmur heard. No friction rub. No gallop. Pulmonary:      Effort: Pulmonary effort is normal. No respiratory distress. Breath sounds: Normal breath sounds. Abdominal:      General: Bowel sounds are normal. There is no distension. Palpations: Abdomen is soft. Tenderness: There is no abdominal tenderness. There is no guarding or rebound. Genitourinary:     Labia:         Right: No rash, tenderness or lesion. Left: No rash, tenderness or lesion. Vagina: Normal. No vaginal discharge, erythema or tenderness. Cervix: No cervical motion tenderness, discharge or friability. Uterus: Not deviated, not enlarged, not fixed and not tender. Adnexa:         Right: No mass, tenderness or fullness. Left: No mass, tenderness or fullness. Musculoskeletal:      Cervical back: Neck supple. Right lower leg: No edema. Left lower leg: No edema. Lymphadenopathy:      Cervical: No cervical adenopathy. Neurological:      Mental Status: She is alert and oriented to person, place, and time. Deep Tendon Reflexes: Reflexes are normal and symmetric. Endometrial biopsy    Date/Time: 9/18/2023 3:45 PM    Performed by: Loreto Spangler MD  Authorized by: Loreto Spangler MD  Universal Protocol:  Consent: Verbal consent obtained. Risks and benefits: risks, benefits and alternatives were discussed  Consent given by: patient  Patient understanding: patient states understanding of the procedure being performed      Indication:     Indications:  Other disorder of menstruation and other abnormal bleeding from female genital tract    Procedure:     Procedure: endometrial biopsy with Pipelle      A bivalve speculum was placed in the vagina: yes      Cervix cleaned and prepped: yes      The cervix was dilated: no      Uterus sounded: yes      Uterus sound depth (cm):  9    Specimen collected: specimen collected and sent to pathology      Patient tolerated procedure well with no complications: yes

## 2023-09-19 ENCOUNTER — TELEPHONE (OUTPATIENT)
Dept: OBGYN CLINIC | Facility: CLINIC | Age: 48
End: 2023-09-19

## 2023-09-19 NOTE — TELEPHONE ENCOUNTER
Talked to patient she is scheduled for her surgical procedure on 10/17/2023 with Dr. Carolyn Arroyo in the [facility].  Surgical packet mailed out

## 2023-09-21 PROCEDURE — 88305 TISSUE EXAM BY PATHOLOGIST: CPT | Performed by: PATHOLOGY

## 2023-09-22 NOTE — ASSESSMENT & PLAN NOTE
Treatment options for menorrhagia discussed:    Observation with treatment of anemia, optimize nutrition  Medications: hormones ( pill, IUD, injection, ring, patch, implant, combined and progestin only) and TXA  Surgery: D&C, Hysteroscopy, Ablation, Hysterectomy    After review of each, focused on IUD vs ablation. Suri Young is not interested in any hormonal options at this time. A component of her symptoms may be adenomyosis and a hormonal treatment may be beneficial.     EB completed.   Plan for [facility]  Hysteroscopy with Novasure Ablation

## 2023-09-28 ENCOUNTER — APPOINTMENT (OUTPATIENT)
Dept: LAB | Facility: HOSPITAL | Age: 48
End: 2023-09-28
Attending: FAMILY MEDICINE
Payer: COMMERCIAL

## 2023-09-28 DIAGNOSIS — Z01.818 PRE-OP TESTING: ICD-10-CM

## 2023-09-28 DIAGNOSIS — M25.50 ARTHRALGIA, UNSPECIFIED JOINT: ICD-10-CM

## 2023-09-28 DIAGNOSIS — M79.641 PAIN IN BOTH HANDS: ICD-10-CM

## 2023-09-28 DIAGNOSIS — M79.642 PAIN IN BOTH HANDS: ICD-10-CM

## 2023-09-28 LAB
ANION GAP SERPL CALCULATED.3IONS-SCNC: 6 MMOL/L
BUN SERPL-MCNC: 13 MG/DL (ref 5–25)
CALCIUM SERPL-MCNC: 9.3 MG/DL (ref 8.4–10.2)
CHLORIDE SERPL-SCNC: 106 MMOL/L (ref 96–108)
CO2 SERPL-SCNC: 26 MMOL/L (ref 21–32)
CREAT SERPL-MCNC: 0.91 MG/DL (ref 0.6–1.3)
CRP SERPL QL: 4.8 MG/L
ERYTHROCYTE [DISTWIDTH] IN BLOOD BY AUTOMATED COUNT: 19.4 % (ref 11.6–15.1)
GFR SERPL CREATININE-BSD FRML MDRD: 75 ML/MIN/1.73SQ M
GLUCOSE P FAST SERPL-MCNC: 106 MG/DL (ref 65–99)
HCT VFR BLD AUTO: 34.2 % (ref 34.8–46.1)
HGB BLD-MCNC: 10.6 G/DL (ref 11.5–15.4)
MCH RBC QN AUTO: 22.7 PG (ref 26.8–34.3)
MCHC RBC AUTO-ENTMCNC: 31 G/DL (ref 31.4–37.4)
MCV RBC AUTO: 73 FL (ref 82–98)
PLATELET # BLD AUTO: 317 THOUSANDS/UL (ref 149–390)
PMV BLD AUTO: 10.1 FL (ref 8.9–12.7)
POTASSIUM SERPL-SCNC: 3.9 MMOL/L (ref 3.5–5.3)
RBC # BLD AUTO: 4.67 MILLION/UL (ref 3.81–5.12)
SODIUM SERPL-SCNC: 138 MMOL/L (ref 135–147)
WBC # BLD AUTO: 9.57 THOUSAND/UL (ref 4.31–10.16)

## 2023-09-28 PROCEDURE — 85027 COMPLETE CBC AUTOMATED: CPT

## 2023-09-28 PROCEDURE — 86140 C-REACTIVE PROTEIN: CPT

## 2023-09-28 PROCEDURE — 86038 ANTINUCLEAR ANTIBODIES: CPT

## 2023-09-28 PROCEDURE — 36415 COLL VENOUS BLD VENIPUNCTURE: CPT

## 2023-09-28 PROCEDURE — 80048 BASIC METABOLIC PNL TOTAL CA: CPT

## 2023-09-30 LAB — ANA TITR SER IF: NEGATIVE {TITER}

## 2023-10-03 DIAGNOSIS — M79.641 PAIN IN BOTH HANDS: ICD-10-CM

## 2023-10-03 DIAGNOSIS — M79.642 PAIN IN BOTH HANDS: ICD-10-CM

## 2023-10-03 DIAGNOSIS — M25.50 ARTHRALGIA, UNSPECIFIED JOINT: Primary | ICD-10-CM

## 2023-10-03 DIAGNOSIS — R76.8 ANA POSITIVE: ICD-10-CM

## 2023-10-06 NOTE — PRE-PROCEDURE INSTRUCTIONS
Pre-Surgery Instructions:   Medication Instructions   • lubiprostone (AMITIZA) 24 mcg capsule Uses PRN- OK to take day of surgery   • nystatin (MYCOSTATIN) ointment Hold day of surgery. Medication instructions for day surgery reviewed. Please use only a sip of water to take your instructed medications. Avoid all over the counter vitamins, supplements and NSAIDS for one week prior to surgery per anesthesia guidelines. Tylenol is ok to take as needed. You will receive a call one business day prior to surgery with an arrival time and hospital directions. If your surgery is scheduled on a Monday, the hospital will be calling you on the Friday prior to your surgery. If you have not heard from anyone by 8pm, please call the hospital supervisor through the hospital  at [phone]. Tai Engel [phone]). Do not eat or drink anything after midnight the night before your surgery, including candy, mints, lifesavers, or chewing gum. Do not drink alcohol 24hrs before your surgery. Try not to smoke at least 24hrs before your surgery. Follow the pre surgery showering instructions as listed in the [facility] Surgical Experience Booklet” or otherwise provided by your surgeon's office. Do not shave the surgical area 24 hours before surgery. Do not apply any lotions, creams, including makeup, cologne, deodorant, or perfumes after showering on the day of your surgery. No contact lenses, eye make-up, or artificial eyelashes. Remove nail polish, including gel polish, and any artificial, gel, or acrylic nails if possible. Remove all jewelry including rings and body piercing jewelry. Wear causal clothing that is easy to take on and off. Consider your type of surgery. Keep any valuables, jewelry, piercings at home. Please bring any specially ordered equipment (sling, braces) if indicated. Arrange for a responsible person to drive you to and from the hospital on the day of your surgery.  Visitor Guidelines discussed. Call the surgeon's office with any new illnesses, exposures, or additional questions prior to surgery. Please reference your [facility] Surgical Experience Booklet” for additional information to prepare for your upcoming surgery.

## 2023-10-16 ENCOUNTER — ANESTHESIA EVENT (OUTPATIENT)
Dept: PERIOP | Facility: HOSPITAL | Age: 48
End: 2023-10-16
Payer: COMMERCIAL

## 2023-10-17 ENCOUNTER — HOSPITAL ENCOUNTER (OUTPATIENT)
Facility: HOSPITAL | Age: 48
Setting detail: OUTPATIENT SURGERY
Discharge: HOME | End: 2023-10-17
Attending: OBSTETRICS & GYNECOLOGY | Admitting: OBSTETRICS & GYNECOLOGY
Payer: COMMERCIAL

## 2023-10-17 ENCOUNTER — ANESTHESIA (OUTPATIENT)
Dept: PERIOP | Facility: HOSPITAL | Age: 48
End: 2023-10-17
Payer: COMMERCIAL

## 2023-10-17 VITALS
HEART RATE: 73 BPM | RESPIRATION RATE: 17 BRPM | BODY MASS INDEX: 35.02 KG/M2 | OXYGEN SATURATION: 99 % | WEIGHT: 223.11 LBS | SYSTOLIC BLOOD PRESSURE: 121 MMHG | DIASTOLIC BLOOD PRESSURE: 61 MMHG | HEIGHT: 67 IN | TEMPERATURE: 97.6 F

## 2023-10-17 DIAGNOSIS — G89.18 POSTOPERATIVE PAIN: Primary | ICD-10-CM

## 2023-10-17 DIAGNOSIS — N92.1 MENORRHAGIA WITH IRREGULAR CYCLE: ICD-10-CM

## 2023-10-17 PROBLEM — D25.0 FIBROIDS, SUBMUCOSAL: Status: ACTIVE | Noted: 2023-10-17

## 2023-10-17 LAB
EXT PREGNANCY TEST URINE: NEGATIVE
EXT. CONTROL: NORMAL

## 2023-10-17 PROCEDURE — 88305 TISSUE EXAM BY PATHOLOGIST: CPT | Performed by: PATHOLOGY

## 2023-10-17 RX ORDER — IBUPROFEN 600 MG/1
600 TABLET ORAL EVERY 6 HOURS PRN
Status: CANCELLED | OUTPATIENT
Start: 2023-10-17

## 2023-10-17 RX ORDER — SODIUM CHLORIDE, SODIUM LACTATE, POTASSIUM CHLORIDE, CALCIUM CHLORIDE 600; 310; 30; 20 MG/100ML; MG/100ML; MG/100ML; MG/100ML
125 INJECTION, SOLUTION INTRAVENOUS CONTINUOUS
Status: DISCONTINUED | OUTPATIENT
Start: 2023-10-17 | End: 2023-10-17 | Stop reason: HOSPADM

## 2023-10-17 RX ORDER — ONDANSETRON 2 MG/ML
INJECTION INTRAMUSCULAR; INTRAVENOUS AS NEEDED
Status: DISCONTINUED | OUTPATIENT
Start: 2023-10-17 | End: 2023-10-17

## 2023-10-17 RX ORDER — FENTANYL CITRATE/PF 50 MCG/ML
50 SYRINGE (ML) INJECTION
Status: COMPLETED | OUTPATIENT
Start: 2023-10-17 | End: 2023-10-17

## 2023-10-17 RX ORDER — MAGNESIUM HYDROXIDE 1200 MG/15ML
LIQUID ORAL AS NEEDED
Status: DISCONTINUED | OUTPATIENT
Start: 2023-10-17 | End: 2023-10-17 | Stop reason: HOSPADM

## 2023-10-17 RX ORDER — PROPOFOL 10 MG/ML
INJECTION, EMULSION INTRAVENOUS CONTINUOUS PRN
Status: DISCONTINUED | OUTPATIENT
Start: 2023-10-17 | End: 2023-10-17

## 2023-10-17 RX ORDER — MIDAZOLAM HYDROCHLORIDE 2 MG/2ML
INJECTION, SOLUTION INTRAMUSCULAR; INTRAVENOUS AS NEEDED
Status: DISCONTINUED | OUTPATIENT
Start: 2023-10-17 | End: 2023-10-17

## 2023-10-17 RX ORDER — IBUPROFEN 600 MG/1
600 TABLET ORAL EVERY 6 HOURS PRN
Qty: 30 TABLET | Refills: 0 | Status: SHIPPED | OUTPATIENT
Start: 2023-10-17

## 2023-10-17 RX ORDER — CYCLOBENZAPRINE HCL 10 MG
10 TABLET ORAL 3 TIMES DAILY PRN
Status: CANCELLED | OUTPATIENT
Start: 2023-10-17

## 2023-10-17 RX ORDER — OXYCODONE HYDROCHLORIDE 5 MG/1
5 TABLET ORAL EVERY 4 HOURS PRN
Status: CANCELLED | OUTPATIENT
Start: 2023-10-17

## 2023-10-17 RX ORDER — FENTANYL CITRATE 50 UG/ML
INJECTION, SOLUTION INTRAMUSCULAR; INTRAVENOUS AS NEEDED
Status: DISCONTINUED | OUTPATIENT
Start: 2023-10-17 | End: 2023-10-17

## 2023-10-17 RX ORDER — LIDOCAINE HYDROCHLORIDE 10 MG/ML
0.5 INJECTION, SOLUTION EPIDURAL; INFILTRATION; INTRACAUDAL; PERINEURAL ONCE AS NEEDED
Status: DISCONTINUED | OUTPATIENT
Start: 2023-10-17 | End: 2023-10-17 | Stop reason: HOSPADM

## 2023-10-17 RX ORDER — ACETAMINOPHEN 325 MG/1
650 TABLET ORAL EVERY 6 HOURS PRN
Status: CANCELLED | OUTPATIENT
Start: 2023-10-17

## 2023-10-17 RX ORDER — ACETAMINOPHEN 325 MG/1
650 TABLET ORAL EVERY 6 HOURS PRN
Refills: 0
Start: 2023-10-17

## 2023-10-17 RX ORDER — LIDOCAINE HYDROCHLORIDE 20 MG/ML
INJECTION, SOLUTION EPIDURAL; INFILTRATION; INTRACAUDAL; PERINEURAL AS NEEDED
Status: DISCONTINUED | OUTPATIENT
Start: 2023-10-17 | End: 2023-10-17

## 2023-10-17 RX ORDER — OXYCODONE HYDROCHLORIDE 10 MG/1
10 TABLET ORAL ONCE
Status: COMPLETED | OUTPATIENT
Start: 2023-10-17 | End: 2023-10-17

## 2023-10-17 RX ORDER — CYCLOBENZAPRINE HCL 10 MG
10 TABLET ORAL 3 TIMES DAILY PRN
Qty: 6 TABLET | Refills: 0 | Status: SHIPPED | OUTPATIENT
Start: 2023-10-17

## 2023-10-17 RX ORDER — LIDOCAINE HYDROCHLORIDE AND EPINEPHRINE 10; 10 MG/ML; UG/ML
INJECTION, SOLUTION INFILTRATION; PERINEURAL AS NEEDED
Status: DISCONTINUED | OUTPATIENT
Start: 2023-10-17 | End: 2023-10-17 | Stop reason: HOSPADM

## 2023-10-17 RX ORDER — PROPOFOL 10 MG/ML
INJECTION, EMULSION INTRAVENOUS AS NEEDED
Status: DISCONTINUED | OUTPATIENT
Start: 2023-10-17 | End: 2023-10-17

## 2023-10-17 RX ADMIN — PROPOFOL 50 MG: 10 INJECTION, EMULSION INTRAVENOUS at 11:52

## 2023-10-17 RX ADMIN — ONDANSETRON 4 MG: 2 INJECTION INTRAMUSCULAR; INTRAVENOUS at 11:47

## 2023-10-17 RX ADMIN — PROPOFOL 100 MCG/KG/MIN: 10 INJECTION, EMULSION INTRAVENOUS at 11:52

## 2023-10-17 RX ADMIN — LIDOCAINE HYDROCHLORIDE 60 MG: 20 INJECTION, SOLUTION EPIDURAL; INFILTRATION; INTRACAUDAL; PERINEURAL at 11:52

## 2023-10-17 RX ADMIN — FENTANYL CITRATE 25 MCG: 50 INJECTION INTRAMUSCULAR; INTRAVENOUS at 12:15

## 2023-10-17 RX ADMIN — SODIUM CHLORIDE, SODIUM LACTATE, POTASSIUM CHLORIDE, AND CALCIUM CHLORIDE 125 ML/HR: .6; .31; .03; .02 INJECTION, SOLUTION INTRAVENOUS at 11:23

## 2023-10-17 RX ADMIN — FENTANYL CITRATE 25 MCG: 50 INJECTION INTRAMUSCULAR; INTRAVENOUS at 12:00

## 2023-10-17 RX ADMIN — OXYCODONE HYDROCHLORIDE 10 MG: 10 TABLET ORAL at 13:59

## 2023-10-17 RX ADMIN — FENTANYL CITRATE 50 MCG: 50 INJECTION INTRAMUSCULAR; INTRAVENOUS at 12:40

## 2023-10-17 RX ADMIN — FENTANYL CITRATE 50 MCG: 50 INJECTION INTRAMUSCULAR; INTRAVENOUS at 12:45

## 2023-10-17 RX ADMIN — FENTANYL CITRATE 25 MCG: 50 INJECTION INTRAMUSCULAR; INTRAVENOUS at 11:52

## 2023-10-17 RX ADMIN — MIDAZOLAM 2 MG: 1 INJECTION INTRAMUSCULAR; INTRAVENOUS at 11:47

## 2023-10-17 NOTE — ANESTHESIA POSTPROCEDURE EVALUATION
Post-Op Assessment Note    CV Status:  Stable  Pain Score: 0    Pain management: adequate     Mental Status:  Sleepy   Hydration Status:  Euvolemic   PONV Controlled:  Controlled   Airway Patency:  Patent      Post Op Vitals Reviewed: Yes      Staff: Anesthesiologist, CRNA         No notable events documented.     BP   110/71   Temp  98.0   Pulse  87   Resp   19   SpO2   98

## 2023-10-17 NOTE — INTERVAL H&P NOTE
H&P reviewed. After examining the patient I find no changes in the patients condition since the H&P had been written.     Vitals:    10/17/23 1114   BP: 121/80   Pulse: 77   Resp: 16   Temp: (!) 97 °F (36.1 °C)   SpO2: 99%

## 2023-10-17 NOTE — ANESTHESIA PREPROCEDURE EVALUATION
Procedure:  DILATATION AND CURETTAGE (D&C) WITH HYSTEROSCOPY (Uterus)  ABLATION ENDOMETRIAL NOVASURE (Uterus)    Relevant Problems   HEMATOLOGY   (+) Anemia      MUSCULOSKELETAL   (+) Acute midline low back pain without sciatica      Other   (+) Other chronic diseases of tonsils and adenoids        Physical Exam    Airway    Mallampati score: II         Dental   No notable dental hx     Cardiovascular      Pulmonary      Other Findings        Anesthesia Plan  ASA Score- 2     Anesthesia Type- general with ASA Monitors. Additional Monitors:     Airway Plan: LMA. Comment: I, Dr. Sujit Gandara, the attending physician, have personally seen and evaluated the patient prior to anesthetic care. I have reviewed the pre-anesthetic record, and other medical records if appropriate to the anesthetic care. If a CRNA is involved in the case, I have reviewed the CRNA assessment, if present, and agree. The patient is in a suitable condition to proceed with my formulated anesthetic plan. .       Plan Factors-    Chart reviewed. Induction- intravenous. Postoperative Plan-     Informed Consent- Anesthetic plan and risks discussed with patient. I personally reviewed this patient with the CRNA. Discussed and agreed on the Anesthesia Plan with the CRNA. Aminah Brooks

## 2023-10-17 NOTE — OP NOTE
OPERATIVE REPORT  PATIENT NAME: Lupe Palacios    :  1975  MRN: [id number]  Pt Location: [facility] OR ROOM 04    SURGERY DATE: 10/17/2023    Surgeon(s) and Role:     * Flavio Heimlich, MD - Primary    Preop Diagnosis:  Menorrhagia with irregular cycle [N92.1]    Post-Op Diagnosis Codes:     * Menorrhagia with irregular cycle [N92.1]     * Fibroids, submucosal [D25.0]    Procedure(s):  DILATATION AND CURETTAGE (D&C) WITH HYSTEROSCOPY  ABLATION ENDOMETRIAL NOVASURE  Fibroid resection     Specimen(s):  ID Type Source Tests Collected by Time Destination   1 : Endometrial Currettings Tissue Endometrium TISSUE EXAM Flavio Heimlich, MD 10/17/2023 1136    2 : Submucosal fibriod Tissue Myomectomy w/o Uterus TISSUE EXAM Flavio Heimlich, MD 10/17/2023 1214        Estimated Blood Loss:   Minimal    Drains:  * No LDAs found *    Anesthesia Type:   General    Operative Indications:  Menorrhagia with irregular cycle [N92.1]      Operative Findings:  Normal appearing cervix easily dilated. Uterus sounded to 10.5 cm  Endometrium: two lesions suspicious for fibroid,submucosal noted. One superior mid body,other inferior closer to cervix. Approximately 1-1.5 cm in size. Remainder of endometrium normal in appearance. Bilateral ostia visualized. Measurements:      Complications:   None    Procedure and Technique:  Patient was identified in the holding area and brought back to the operating room where general anesthesia was initiated. Bilateral lower extremity compression boots were placed prior to initiation of anesthesia. Patient was placed in the dorsal lithotomy position and prepped and draped in a sterile fashion. Timeout procedure was completed. Exam under anesthesia was completed and above findings noted. A weighted speculum and oneil were placed into the vagina for visualization of the cervix. A single toothed tenaculum was used to grasp the anterior lip of the cervix. Paracervical block was placed. The uterus was sounded and the cervix was dilated to accommodate the insertion of the hysteroscope. The hysteroscope was inserted and using normal saline as the distension fluid the endometrium was visualized. The above findings were noted. The Symphion system was set up according to product instructions and purge was completed. The scope was reinserted, lesions visualized and using the tissue rescetion device the fibroids were excised under direct visualization. Good hemostasis was noted. The hysteroscope was removed and the specimen was sent to pathology for evaluation. Endometrial curette was completed. The Novasure device was inserted and the above noted measurements of length and width were inputted to the machine. The cavity assessment was successful and ablation occurred. All instruments were removed from the vagina and good hemostasis was noted. The patient was extubated and brought the the recovery room in stable condition. All sponge, lap and needle counts were correct. I was present for the entire procedure., I was present for all critical portions of the procedure. , and A qualified resident physician was not available.     Patient Disposition:  PACU         SIGNATURE: Kristi Hayward MD  DATE: October 17, 2023  TIME: 12:26 PM

## 2023-10-21 PROCEDURE — 88305 TISSUE EXAM BY PATHOLOGIST: CPT | Performed by: PATHOLOGY

## 2023-12-22 ENCOUNTER — HOSPITAL ENCOUNTER (OUTPATIENT)
Dept: MAMMOGRAPHY | Facility: CLINIC | Age: 48
End: 2023-12-22
Payer: COMMERCIAL

## 2023-12-22 VITALS — WEIGHT: 223 LBS | HEIGHT: 67 IN | BODY MASS INDEX: 35 KG/M2

## 2023-12-22 DIAGNOSIS — Z12.31 ENCOUNTER FOR SCREENING MAMMOGRAM FOR BREAST CANCER: ICD-10-CM

## 2023-12-22 PROCEDURE — 77067 SCR MAMMO BI INCL CAD: CPT

## 2023-12-22 PROCEDURE — 77063 BREAST TOMOSYNTHESIS BI: CPT

## 2024-01-23 ENCOUNTER — OFFICE VISIT (OUTPATIENT)
Dept: OBGYN CLINIC | Facility: CLINIC | Age: 49
End: 2024-01-23
Payer: COMMERCIAL

## 2024-01-23 VITALS
DIASTOLIC BLOOD PRESSURE: 78 MMHG | HEIGHT: 67 IN | BODY MASS INDEX: 37.01 KG/M2 | SYSTOLIC BLOOD PRESSURE: 124 MMHG | WEIGHT: 235.8 LBS

## 2024-01-23 DIAGNOSIS — N92.1 MENORRHAGIA WITH IRREGULAR CYCLE: Primary | ICD-10-CM

## 2024-01-23 PROCEDURE — 99213 OFFICE O/P EST LOW 20 MIN: CPT | Performed by: OBSTETRICS & GYNECOLOGY

## 2024-01-23 NOTE — PROGRESS NOTES
Assessment/Plan:    Menorrhagia with irregular cycle  Resolved after Ablation.  REcommend follow up for annual visit.     Should implement healthy diet, hydration, weight control       Diagnoses and all orders for this visit:    Menorrhagia with irregular cycle          Subjective:      Patient ID: Heidi Wheatley is a 48 y.o. female.    Patient here for 3 month follow up since D&C, ablation and myomectomy 10/17/23. States she doing very well! No concerns or issues to report. No menses since surgery.     Pathology reviewed- benign tissue. After recovery, no menses.  Some monthly cramping or headache to signal time of month but minimal. Very happy with results.     Gynecologic Exam  She reports no genital itching, genital lesions, genital odor, genital rash, pelvic pain, vaginal bleeding or vaginal discharge. The patient is experiencing no pain. Pertinent negatives include no chills, constipation, diarrhea, dysuria, fever, flank pain, frequency, headaches, nausea, painful intercourse, rash, sore throat, urgency or vomiting. Nothing aggravates the symptoms. Past treatments include nothing.       The following portions of the patient's history were reviewed and updated as appropriate: She  has a past medical history of COVID-19 virus infection (3/19/2021), Headache(784.0), Migraine, Nasal turbinate hypertrophy, Tonsillitis, chronic, and Wears glasses.  She   Patient Active Problem List    Diagnosis Date Noted    Fibroids, submucosal 10/17/2023    Menorrhagia with irregular cycle 06/05/2023    Acute pain of left knee 04/26/2023    Abscess of axilla, right 09/20/2022    Acute midline low back pain without sciatica 11/17/2021    Impaired fasting glucose 11/17/2021    Hemorrhoids, external 11/17/2021    S/P tonsillectomy 06/24/2020    Plantar fasciitis of left foot 12/31/2019    Hypertrophy of both inferior nasal turbinates 10/28/2019    Other chronic diseases of tonsils and adenoids 10/28/2019    Encounter for  gynecological examination without abnormal finding 12/10/2018    Prediabetes 2018    Anemia 2017    Irritable bowel syndrome with constipation 10/03/2017     She  has a past surgical history that includes  section; Appendectomy; Tubal ligation; TONSILLECTOMY (Bilateral, 2019); Turbinate resection (N/A, 2019); Mammo stereotactic breast biopsy left (all inc) (Left, 2020); Breast biopsy (Left, 2020); Colonoscopy; pr hysteroscopy bx endometrium&/polypc w/wo d&c (N/A, 10/17/2023); pr hysteroscopy endometrial ablation (N/A, 10/17/2023); and Myomectomy (N/A, 10/17/2023).  Her family history includes Breast cancer (age of onset: 45) in her maternal aunt; Cancer in her family and maternal grandmother; No Known Problems in her daughter, daughter, father, maternal aunt, maternal aunt, maternal grandfather, mother, paternal grandfather, and paternal grandmother.  She  reports that she has never smoked. She has never used smokeless tobacco. She reports current alcohol use of about 1.0 - 2.0 standard drink of alcohol per week. She reports that she does not use drugs.  Current Outpatient Medications   Medication Sig Dispense Refill    acetaminophen (TYLENOL) 325 mg tablet Take 2 tablets (650 mg total) by mouth every 6 (six) hours as needed for mild pain  0    cyclobenzaprine (FLEXERIL) 10 mg tablet Take 1 tablet (10 mg total) by mouth 3 (three) times a day as needed for muscle spasms 6 tablet 0    ibuprofen (MOTRIN) 600 mg tablet Take 1 tablet (600 mg total) by mouth every 6 (six) hours as needed for moderate pain 30 tablet 0    lubiprostone (AMITIZA) 24 mcg capsule Take 1 capsule (24 mcg total) by mouth 2 (two) times a day with meals (Patient taking differently: Take 24 mcg by mouth if needed) 60 capsule 4    nystatin (MYCOSTATIN) ointment Apply topically 2 (two) times a day for 28 days (Patient taking differently: Apply topically if needed) 30 g 1     No current facility-administered  "medications for this visit.     Current Outpatient Medications on File Prior to Visit   Medication Sig    acetaminophen (TYLENOL) 325 mg tablet Take 2 tablets (650 mg total) by mouth every 6 (six) hours as needed for mild pain    cyclobenzaprine (FLEXERIL) 10 mg tablet Take 1 tablet (10 mg total) by mouth 3 (three) times a day as needed for muscle spasms    ibuprofen (MOTRIN) 600 mg tablet Take 1 tablet (600 mg total) by mouth every 6 (six) hours as needed for moderate pain    lubiprostone (AMITIZA) 24 mcg capsule Take 1 capsule (24 mcg total) by mouth 2 (two) times a day with meals (Patient taking differently: Take 24 mcg by mouth if needed)    nystatin (MYCOSTATIN) ointment Apply topically 2 (two) times a day for 28 days (Patient taking differently: Apply topically if needed)     No current facility-administered medications on file prior to visit.     She has No Known Allergies..    Review of Systems   Constitutional:  Negative for activity change, appetite change, chills, fatigue and fever.   HENT:  Negative for rhinorrhea, sneezing and sore throat.    Eyes:  Negative for visual disturbance.   Respiratory:  Negative for cough, shortness of breath and wheezing.    Cardiovascular:  Negative for chest pain, palpitations and leg swelling.   Gastrointestinal:  Negative for abdominal distention, constipation, diarrhea, nausea and vomiting.   Genitourinary:  Negative for difficulty urinating, dysuria, flank pain, frequency, pelvic pain, urgency and vaginal discharge.   Skin:  Negative for rash.   Neurological:  Negative for syncope, light-headedness and headaches.         Objective:      /78 (BP Location: Left arm, Patient Position: Sitting, Cuff Size: Standard)   Ht 5' 7\" (1.702 m)   Wt 107 kg (235 lb 12.8 oz)   BMI 36.93 kg/m²          Physical Exam      "

## 2024-01-25 NOTE — ASSESSMENT & PLAN NOTE
Resolved after Ablation.  REcommend follow up for annual visit.     Should implement healthy diet, hydration, weight control

## 2024-02-21 PROBLEM — Z01.419 ENCOUNTER FOR GYNECOLOGICAL EXAMINATION WITHOUT ABNORMAL FINDING: Status: RESOLVED | Noted: 2018-12-10 | Resolved: 2024-02-21

## 2024-03-29 ENCOUNTER — OFFICE VISIT (OUTPATIENT)
Dept: URGENT CARE | Facility: CLINIC | Age: 49
End: 2024-03-29
Payer: COMMERCIAL

## 2024-03-29 VITALS
OXYGEN SATURATION: 99 % | DIASTOLIC BLOOD PRESSURE: 78 MMHG | SYSTOLIC BLOOD PRESSURE: 113 MMHG | BODY MASS INDEX: 36.27 KG/M2 | HEART RATE: 80 BPM | TEMPERATURE: 98 F | WEIGHT: 231.6 LBS | RESPIRATION RATE: 20 BRPM

## 2024-03-29 DIAGNOSIS — J01.90 ACUTE RHINOSINUSITIS: Primary | ICD-10-CM

## 2024-03-29 DIAGNOSIS — J02.9 SORE THROAT: ICD-10-CM

## 2024-03-29 LAB — S PYO AG THROAT QL: NEGATIVE

## 2024-03-29 PROCEDURE — 87880 STREP A ASSAY W/OPTIC: CPT | Performed by: PHYSICIAN ASSISTANT

## 2024-03-29 PROCEDURE — 99203 OFFICE O/P NEW LOW 30 MIN: CPT | Performed by: PHYSICIAN ASSISTANT

## 2024-03-29 RX ORDER — FLUTICASONE PROPIONATE 50 MCG
2 SPRAY, SUSPENSION (ML) NASAL DAILY
Qty: 16 G | Refills: 1 | Status: SHIPPED | OUTPATIENT
Start: 2024-03-29

## 2024-03-29 RX ORDER — AMOXICILLIN AND CLAVULANATE POTASSIUM 875; 125 MG/1; MG/1
1 TABLET, FILM COATED ORAL EVERY 12 HOURS SCHEDULED
Qty: 20 TABLET | Refills: 0 | Status: SHIPPED | OUTPATIENT
Start: 2024-03-29 | End: 2024-04-08

## 2024-03-29 NOTE — PATIENT INSTRUCTIONS
American Academy of Pediatrics: Acute bacterial sinusitis can be diagnosed in children with an acute upper respiratory infection that persists (nasal discharge or daytime cough for more than 10 days with no improvement), that gets worse (worsening or new nasal discharge, daytime cough, or fever after improving at first), or that is severe (concomitant fever of at least 102.2°F [39°C] and purulent nasal discharge for at least three consecutive days).       Hold Amoxicillin as prescribed until meeting the above criteria  Tylenol Sinus over the counter  Warm compresses over sinuses  Steam treatment (practice proper safety precautions when handing hot liquids/steam)  Over the counter saline nasal spray  Follow up with PCP in 3-5 days.  Proceed to  ER if symptoms worsen.    Eat yogurt with live and active cultures and/or take a probiotic at least 3 hours before or after antibiotic dose. Monitor stool for diarrhea and/or blood. If this occurs, contact primary care doctor ASAP.

## 2024-03-29 NOTE — PROGRESS NOTES
[facility]        NAME: Heidi Wheatley is a 48 y.o. female  : 1975    MRN: [id number]  DATE: 2024  TIME: 11:03 AM    Assessment and Plan   Acute rhinosinusitis [J01.90]  1. Acute rhinosinusitis  amoxicillin-clavulanate (AUGMENTIN) 875-125 mg per tablet    fluticasone (FLONASE) 50 mcg/act nasal spray      2. Sore throat  POCT rapid ANTIGEN strepA          Explained to the patient that if she still has symptoms after completion of treatment she needs to follow-up with PCP for further workup    Patient Instructions     Patient Instructions   American Academy of Pediatrics: Acute bacterial sinusitis can be diagnosed in children with an acute upper respiratory infection that persists (nasal discharge or daytime cough for more than 10 days with no improvement), that gets worse (worsening or new nasal discharge, daytime cough, or fever after improving at first), or that is severe (concomitant fever of at least 102.2°F [39°C] and purulent nasal discharge for at least three consecutive days).       Hold Amoxicillin as prescribed until meeting the above criteria  Tylenol Sinus over the counter  Warm compresses over sinuses  Steam treatment (practice proper safety precautions when handing hot liquids/steam)  Over the counter saline nasal spray  Follow up with PCP in 3-5 days.  Proceed to  ER if symptoms worsen.    Eat yogurt with live and active cultures and/or take a probiotic at least 3 hours before or after antibiotic dose. Monitor stool for diarrhea and/or blood. If this occurs, contact primary care doctor ASAP.        Follow up with PCP in 3-5 days.  Proceed to  ER if symptoms worsen.    If tests are performed, our office will contact you with results only if   changes need to made to the care plan discussed with you at the visit.   [facility]     Chief Complaint     Chief Complaint   Patient presents with   • Cold Like Symptoms     Patient here  with 3 days of sore throat, cough, sneezing, right sided ear pain and headache. She states over the counter medication has helped loosen her mucus and relieved some of her sinus pressure, but she is now coughing a lot and right sided throat pain is getting worse as well.          History of Present Illness       Sore Throat   This is a recurrent problem. The current episode started more than 1 month ago. The problem has been waxing and waning. The pain is worse on the right side. There has been no fever. The pain is at a severity of 8/10. Associated symptoms include congestion, coughing, ear pain, headaches and a hoarse voice. Pertinent negatives include no abdominal pain, drooling, swollen glands or trouble swallowing. She has had no exposure to strep or mono. She has tried NSAIDs (cough syrup) for the symptoms. The treatment provided no relief.     Also complains of cough.    Review of Systems   Review of Systems   HENT:  Positive for congestion, ear pain, hoarse voice and sore throat. Negative for drooling and trouble swallowing.    Respiratory:  Positive for cough.    Gastrointestinal:  Negative for abdominal pain.   Neurological:  Positive for headaches.     Except as noted in HPI    Current Medications       Current Outpatient Medications:   •  amoxicillin-clavulanate (AUGMENTIN) 875-125 mg per tablet, Take 1 tablet by mouth every 12 (twelve) hours for 10 days, Disp: 20 tablet, Rfl: 0  •  fluticasone (FLONASE) 50 mcg/act nasal spray, 2 sprays into each nostril daily, Disp: 16 g, Rfl: 1  •  acetaminophen (TYLENOL) 325 mg tablet, Take 2 tablets (650 mg total) by mouth every 6 (six) hours as needed for mild pain (Patient not taking: Reported on 3/29/2024), Disp: , Rfl: 0  •  cyclobenzaprine (FLEXERIL) 10 mg tablet, Take 1 tablet (10 mg total) by mouth 3 (three) times a day as needed for muscle spasms (Patient not taking: Reported on 3/29/2024), Disp: 6 tablet, Rfl: 0  •  ibuprofen (MOTRIN) 600 mg tablet, Take 1  tablet (600 mg total) by mouth every 6 (six) hours as needed for moderate pain (Patient not taking: Reported on 3/29/2024), Disp: 30 tablet, Rfl: 0  •  lubiprostone (AMITIZA) 24 mcg capsule, Take 1 capsule (24 mcg total) by mouth 2 (two) times a day with meals (Patient not taking: Reported on 3/29/2024), Disp: 60 capsule, Rfl: 4  •  nystatin (MYCOSTATIN) ointment, Apply topically 2 (two) times a day for 28 days (Patient taking differently: Apply topically if needed), Disp: 30 g, Rfl: 1    Current Allergies     Allergies as of 2024   • (No Known Allergies)            The following portions of the patient's history were reviewed and updated as appropriate: allergies, current medications, past family history, past medical history, past social history, past surgical history and problem list.     Past Medical History:   Diagnosis Date   • COVID-19 virus infection 3/19/2021   • Headache(784.0)    • Migraine    • Nasal turbinate hypertrophy     OR correction today 2019   • Tonsillitis, chronic    • Wears glasses        Past Surgical History:   Procedure Laterality Date   • APPENDECTOMY     • BREAST BIOPSY Left 2020    benign   •  SECTION     • COLONOSCOPY     • MAMMO STEREOTACTIC BREAST BIOPSY LEFT (ALL INC) Left 2020   • MYOMECTOMY N/A 10/17/2023    Procedure: MYOMECTOMY;  Surgeon: Rikva Sequeira MD;  [facility];  Service: Gynecology   • W[place] HYSTEROSCOPY BX ENDOMETRIUM&/POLYPC W/WO D&C N/A 10/17/2023    Procedure: DILATATION AND CURETTAGE (D&C) WITH HYSTEROSCOPY;  Surgeon: Rivka Sequeira MD;  [facility];  Service: Gynecology   • W[place] HYSTEROSCOPY ENDOMETRIAL ABLATION N/A 10/17/2023    Procedure: ABLATION ENDOMETRIAL NOVASURE;  Surgeon: Rivka Sequeira MD;  [facility];  Service: Gynecology   • TONSILLECTOMY Bilateral 2019    Procedure: TONSILLECTOMY;  Surgeon: Karan Santamaria DO;  [facility];  Service: ENT   • TUBAL LIGATION     •  TURBINATE RESECTION N/A 11/19/2019    Procedure: PARTIAL INFERIOR TURBINECTOMY WITH OUTFRACTURE;  Surgeon: Karan Santamaria DO;  [facility];  Service: ENT       Family History   Problem Relation Age of Onset   • No Known Problems Mother    • No Known Problems Father    • No Known Problems Daughter    • No Known Problems Daughter    • Cancer Maternal Grandmother    • No Known Problems Maternal Grandfather    • No Known Problems Paternal Grandmother    • No Known Problems Paternal Grandfather    • No Known Problems Maternal Aunt    • Breast cancer Maternal Aunt 45   • No Known Problems Maternal Aunt    • Cancer Family    • BRCA2 Positive Neg Hx    • BRCA2 Negative Neg Hx    • BRCA1 Positive Neg Hx    • BRCA1 Negative Neg Hx    • Ovarian cancer Neg Hx    • Colon cancer Neg Hx          Medications have been verified.        Objective   /78   Pulse 80   Temp 98 °F (36.7 °C)   Resp 20   Wt 105 kg (231 lb 9.6 oz)   SpO2 99%   BMI 36.27 kg/m²   No LMP recorded. Patient has had an ablation.       Physical Exam     Physical Exam  Constitutional:       General: She is not in acute distress.     Appearance: She is well-developed. She is not diaphoretic.   HENT:      Head: Normocephalic and atraumatic.      Right Ear: Tympanic membrane normal.      Left Ear: Tympanic membrane normal.      Mouth/Throat:      Pharynx: Posterior oropharyngeal erythema present.   Eyes:      General: No scleral icterus.     Conjunctiva/sclera: Conjunctivae normal.   Neck:      Trachea: No tracheal deviation.   Cardiovascular:      Rate and Rhythm: Normal rate and regular rhythm.      Heart sounds: Normal heart sounds. No murmur heard.  Pulmonary:      Effort: Pulmonary effort is normal. No respiratory distress.      Breath sounds: Normal breath sounds. No stridor. No wheezing, rhonchi or rales.   Musculoskeletal:      Cervical back: Normal range of motion and neck supple.   Lymphadenopathy:      Cervical: No cervical adenopathy.  "  Skin:     General: Skin is warm and dry.      Findings: No erythema.   Neurological:      Mental Status: She is alert and oriented to person, place, and time.   Psychiatric:         Behavior: Behavior normal.         Ortho Exam        Procedures  No Procedures performed today        Note: Portions of this record may have been created with voice recognition software. Occasional wrong word or \"sound a like\" substitutions may have occurred due to the inherent limitations of voice recognition software. Please read the chart carefully and recognize, using context, where substitutions have occurred.*      "

## 2024-05-06 ENCOUNTER — APPOINTMENT (OUTPATIENT)
Dept: LAB | Facility: HOSPITAL | Age: 49
End: 2024-05-06
Payer: COMMERCIAL

## 2024-05-06 DIAGNOSIS — Z00.8 HEALTH EXAMINATION IN POPULATION SURVEYS: ICD-10-CM

## 2024-05-06 DIAGNOSIS — Z00.8 HEALTH EXAMINATION IN POPULATION SURVEY: ICD-10-CM

## 2024-05-06 DIAGNOSIS — M79.641 PAIN IN RIGHT HAND: Primary | ICD-10-CM

## 2024-05-06 DIAGNOSIS — M79.642 PAIN IN LEFT HAND: ICD-10-CM

## 2024-05-06 LAB
CHOLEST SERPL-MCNC: 159 MG/DL
CRP SERPL QL: 8.2 MG/L
ERYTHROCYTE [SEDIMENTATION RATE] IN BLOOD: 25 MM/HOUR (ref 0–19)
EST. AVERAGE GLUCOSE BLD GHB EST-MCNC: 137 MG/DL
HBA1C MFR BLD: 6.4 %
HDLC SERPL-MCNC: 51 MG/DL
LDLC SERPL CALC-MCNC: 98 MG/DL (ref 0–100)
NONHDLC SERPL-MCNC: 108 MG/DL
RHEUMATOID FACT SER QL LA: NEGATIVE
TRIGL SERPL-MCNC: 51 MG/DL

## 2024-05-06 PROCEDURE — 86140 C-REACTIVE PROTEIN: CPT

## 2024-05-06 PROCEDURE — 86430 RHEUMATOID FACTOR TEST QUAL: CPT

## 2024-05-06 PROCEDURE — 83036 HEMOGLOBIN GLYCOSYLATED A1C: CPT

## 2024-05-06 PROCEDURE — 80061 LIPID PANEL: CPT

## 2024-05-06 PROCEDURE — 85652 RBC SED RATE AUTOMATED: CPT

## 2024-05-06 PROCEDURE — 36415 COLL VENOUS BLD VENIPUNCTURE: CPT

## 2024-05-06 PROCEDURE — 86200 CCP ANTIBODY: CPT

## 2024-05-08 ENCOUNTER — OFFICE VISIT (OUTPATIENT)
Dept: FAMILY MEDICINE CLINIC | Facility: CLINIC | Age: 49
End: 2024-05-08
Payer: COMMERCIAL

## 2024-05-08 VITALS
SYSTOLIC BLOOD PRESSURE: 128 MMHG | BODY MASS INDEX: 37.04 KG/M2 | TEMPERATURE: 97.9 F | DIASTOLIC BLOOD PRESSURE: 80 MMHG | WEIGHT: 236 LBS | HEIGHT: 67 IN | HEART RATE: 92 BPM | OXYGEN SATURATION: 98 %

## 2024-05-08 DIAGNOSIS — R51.9 CHRONIC DAILY HEADACHE: Primary | ICD-10-CM

## 2024-05-08 DIAGNOSIS — R73.03 PREDIABETES: ICD-10-CM

## 2024-05-08 PROCEDURE — 99214 OFFICE O/P EST MOD 30 MIN: CPT | Performed by: PHYSICIAN ASSISTANT

## 2024-05-09 NOTE — PROGRESS NOTES
Name: Heidi Wheatley      : 1975      MRN: [id number]  Encounter Provider: Nilay Eddy PA-C  Encounter Date: 2024   Encounter department: [facility]    Assessment & Plan     1. Chronic daily headache  -     Comprehensive metabolic panel; Future  -     TSH, 3rd generation with Free T4 reflex; Future  -     CBC and differential; Future    2. Prediabetes  -     Hemoglobin A1C; Future    Her exam is unremarkable. Normotensive, normal neuro exam. Suspect potential ocular HA or cervicogenic HA. Continue supportive measures with excedrin, fluids, rest, monitoring. Keep a HA journal. Labs as above. Return precautions provided    Recheck A1c in 3 months    BMI Counseling: Body mass index is 36.96 kg/m². The BMI is above normal. Nutrition recommendations include reducing portion sizes, decreasing overall calorie intake, moderation in carbohydrate intake, increasing intake of lean protein, reducing intake of saturated fat and trans fat, and reducing intake of cholesterol. Exercise recommendations include moderate aerobic physical activity for 150 minutes/week.         Subjective     Pt with hx of migraine, prediabetes presents with HA. This seemed to start after an eye exam where she was given contacts about 2-3 weeks ago. She since switched back to her glasses. She shares she seems to develop a headache in the night and wake with it in the morning. It is normally dull and non-severe but worsened through the day. It is a global HA. Different than her normal migraine. It does resolve with excedrine. Sometimes when severe vision can be blurry. No aura, weakness, numbness, N/V, speech changes. She shares this week her HA has been less frequent and less severe. No trauma.     Of note, A1c was 6.4    Lab Results       Component                Value               Date                       HGBA1C                   6.4 (H)             2024                  Review of Systems    Constitutional:  Negative for chills, fatigue and fever.   HENT:  Negative for congestion, ear pain, hearing loss, nosebleeds, postnasal drip, rhinorrhea, sinus pressure, sinus pain, sneezing and sore throat.    Eyes:  Negative for pain, discharge, itching and visual disturbance.   Respiratory:  Negative for cough, chest tightness, shortness of breath and wheezing.    Cardiovascular:  Negative for chest pain, palpitations and leg swelling.   Gastrointestinal:  Negative for abdominal pain, blood in stool, constipation, diarrhea, nausea and vomiting.   Neurological:  Positive for headaches. Negative for dizziness, tremors, syncope, speech difficulty, light-headedness and numbness.       Past Medical History:   Diagnosis Date   • COVID-19 virus infection 3/19/2021   • Headache(784.0)    • Migraine    • Nasal turbinate hypertrophy     OR correction today 2019   • Tonsillitis, chronic    • Wears glasses      Past Surgical History:   Procedure Laterality Date   • APPENDECTOMY     • BREAST BIOPSY Left 2020    benign   •  SECTION     • COLONOSCOPY     • MAMMO STEREOTACTIC BREAST BIOPSY LEFT (ALL INC) Left 2020   • MYOMECTOMY N/A 10/17/2023    Procedure: MYOMECTOMY;  Surgeon: Rivka Sequeira MD;  [facility];  Service: Gynecology   • [place] HYSTEROSCOPY BX ENDOMETRIUM&/POLYPC W/WO D&C N/A 10/17/2023    Procedure: DILATATION AND CURETTAGE (D&C) WITH HYSTEROSCOPY;  Surgeon: Rivka Sequeira MD;  [facility];  Service: Gynecology   • MT HYSTEROSCOPY ENDOMETRIAL ABLATION N/A 10/17/2023    Procedure: ABLATION ENDOMETRIAL NOVASURE;  Surgeon: Rivka Sequeira MD;  [facility];  Service: Gynecology   • TONSILLECTOMY Bilateral 2019    Procedure: TONSILLECTOMY;  Surgeon: Karan Santamaria DO;  [facility];  Service: ENT   • TUBAL LIGATION     • TURBINATE RESECTION N/A 2019    Procedure: PARTIAL INFERIOR TURBINECTOMY WITH OUTFRACTURE;  Surgeon: Karan  DO Hina;  [facility];  Service: ENT     Family History   Problem Relation Age of Onset   • No Known Problems Mother    • No Known Problems Father    • No Known Problems Daughter    • No Known Problems Daughter    • Cancer Maternal Grandmother    • No Known Problems Maternal Grandfather    • No Known Problems Paternal Grandmother    • No Known Problems Paternal Grandfather    • No Known Problems Maternal Aunt    • Breast cancer Maternal Aunt 45   • No Known Problems Maternal Aunt    • Cancer Family    • BRCA2 Positive Neg Hx    • BRCA2 Negative Neg Hx    • BRCA1 Positive Neg Hx    • BRCA1 Negative Neg Hx    • Ovarian cancer Neg Hx    • Colon cancer Neg Hx      Social History     Socioeconomic History   • Marital status:      Spouse name: None   • Number of children: None   • Years of education: None   • Highest education level: None   Occupational History   • None   Tobacco Use   • Smoking status: Never   • Smokeless tobacco: Never   Vaping Use   • Vaping status: Never Used   Substance and Sexual Activity   • Alcohol use: Yes     Alcohol/week: 1.0 - 2.0 standard drink of alcohol     Types: 1 - 2 Glasses of wine per week     Comment: Social    • Drug use: Never   • Sexual activity: Yes     Partners: Male     Birth control/protection: Female Sterilization   Other Topics Concern   • None   Social History Narrative   • None     Social Determinants of Health     Financial Resource Strain: Not on file   Food Insecurity: Not on file   Transportation Needs: Not on file   Physical Activity: Not on file   Stress: Not on file   Social Connections: Not on file   Intimate Partner Violence: Not on file   Housing Stability: Not on file     Current Outpatient Medications on File Prior to Visit   Medication Sig   • ibuprofen (MOTRIN) 600 mg tablet Take 1 tablet (600 mg total) by mouth every 6 (six) hours as needed for moderate pain   • lubiprostone (AMITIZA) 24 mcg capsule Take 1 capsule (24 mcg total) by mouth  "2 (two) times a day with meals   • nystatin (MYCOSTATIN) ointment Apply topically 2 (two) times a day for 28 days (Patient taking differently: Apply topically if needed)     No Known Allergies  Immunization History   Administered Date(s) Administered   • COVID-19 PFIZER VACCINE 0.3 ML IM 08/24/2021, 09/14/2021   • INFLUENZA 11/09/2021   • Influenza, injectable, quadrivalent, preservative free 0.5 mL 11/24/2020       Objective     /80 (BP Location: Left arm, Patient Position: Sitting, Cuff Size: Large)   Pulse 92   Temp 97.9 °F (36.6 °C)   Ht 5' 7\" (1.702 m)   Wt 107 kg (236 lb)   SpO2 98%   BMI 36.96 kg/m²     Physical Exam  Vitals and nursing note reviewed.   Constitutional:       General: She is not in acute distress.     Appearance: Normal appearance.   HENT:      Head: Normocephalic and atraumatic.      Nose: Nose normal.      Mouth/Throat:      Mouth: Mucous membranes are moist.      Pharynx: Oropharynx is clear. No oropharyngeal exudate or posterior oropharyngeal erythema.   Eyes:      Pupils: Pupils are equal, round, and reactive to light.   Cardiovascular:      Rate and Rhythm: Normal rate and regular rhythm.      Heart sounds: Normal heart sounds. No murmur heard.  Pulmonary:      Effort: Pulmonary effort is normal. No respiratory distress.      Breath sounds: Normal breath sounds. No wheezing, rhonchi or rales.   Musculoskeletal:         General: Normal range of motion.      Cervical back: Normal range of motion and neck supple.   Skin:     General: Skin is warm and dry.   Neurological:      General: No focal deficit present.      Mental Status: She is alert and oriented to person, place, and time.      Cranial Nerves: No cranial nerve deficit.      Sensory: No sensory deficit.      Motor: No weakness.      Coordination: Coordination normal.      Gait: Gait normal.      Deep Tendon Reflexes: Reflexes normal.   Psychiatric:         Mood and Affect: Mood and affect normal.       Nilay Eddy, " EDIN

## 2024-05-10 LAB — CCP AB SER IA-ACNC: 1.4

## 2024-06-10 ENCOUNTER — PATIENT MESSAGE (OUTPATIENT)
Dept: OBGYN CLINIC | Facility: CLINIC | Age: 49
End: 2024-06-10

## 2024-06-11 ENCOUNTER — PATIENT MESSAGE (OUTPATIENT)
Dept: OBGYN CLINIC | Facility: CLINIC | Age: 49
End: 2024-06-11

## 2024-06-11 DIAGNOSIS — N76.0 ACUTE VAGINITIS: Primary | ICD-10-CM

## 2024-06-11 NOTE — PATIENT COMMUNICATION
Left message for patient to call back with symptoms and when began. Further questions regarding odor etc

## 2024-09-24 ENCOUNTER — OFFICE VISIT (OUTPATIENT)
Dept: FAMILY MEDICINE CLINIC | Facility: CLINIC | Age: 49
End: 2024-09-24
Payer: COMMERCIAL

## 2024-09-24 VITALS
WEIGHT: 236 LBS | OXYGEN SATURATION: 97 % | HEART RATE: 110 BPM | SYSTOLIC BLOOD PRESSURE: 110 MMHG | HEIGHT: 67 IN | BODY MASS INDEX: 37.04 KG/M2 | TEMPERATURE: 98.9 F | DIASTOLIC BLOOD PRESSURE: 78 MMHG

## 2024-09-24 DIAGNOSIS — R68.83 CHILLS (WITHOUT FEVER): Primary | ICD-10-CM

## 2024-09-24 DIAGNOSIS — R09.81 NASAL CONGESTION: ICD-10-CM

## 2024-09-24 DIAGNOSIS — J06.9 VIRAL UPPER RESPIRATORY TRACT INFECTION: ICD-10-CM

## 2024-09-24 DIAGNOSIS — R52 GENERALIZED BODY ACHES: ICD-10-CM

## 2024-09-24 DIAGNOSIS — R09.89 CHEST CONGESTION: ICD-10-CM

## 2024-09-24 DIAGNOSIS — K92.1 HEMATOCHEZIA: ICD-10-CM

## 2024-09-24 PROBLEM — J35.8 OTHER CHRONIC DISEASES OF TONSILS AND ADENOIDS: Status: RESOLVED | Noted: 2019-10-28 | Resolved: 2024-09-24

## 2024-09-24 PROBLEM — D64.9 ANEMIA: Status: RESOLVED | Noted: 2017-12-22 | Resolved: 2024-09-24

## 2024-09-24 LAB
SARS-COV-2 AG UPPER RESP QL IA: NEGATIVE
SL AMB POCT RAPID FLU A: NEGATIVE
SL AMB POCT RAPID FLU B: NEGATIVE
VALID CONTROL: NORMAL

## 2024-09-24 PROCEDURE — 87804 INFLUENZA ASSAY W/OPTIC: CPT | Performed by: NURSE PRACTITIONER

## 2024-09-24 PROCEDURE — 87811 SARS-COV-2 COVID19 W/OPTIC: CPT | Performed by: NURSE PRACTITIONER

## 2024-09-24 PROCEDURE — 99213 OFFICE O/P EST LOW 20 MIN: CPT | Performed by: NURSE PRACTITIONER

## 2024-09-24 NOTE — ASSESSMENT & PLAN NOTE
DW patient follow up with her GI specialist and ordered CBC and CMP to evaluate and also advised ED if having increase in pain or clots     Orders:    Comprehensive metabolic panel; Future    CBC and differential; Future

## 2024-09-24 NOTE — LETTER
September 24, 2024     Patient: Heidi Wheatley  YOB: 1975  Date of Visit: 9/24/2024      To Whom it May Concern:    Heidi Wheatley is under my professional care. Heidi was seen in my office on 9/24/2024. Heidi may return to work on 9/26/2024 .    If you have any questions or concerns, please don't hesitate to call.         Sincerely,          ANILA Davis        CC: No Recipients

## 2024-09-24 NOTE — PROGRESS NOTES
Ambulatory Visit  Name: Heidi Wheatley      : 1975      MRN: [id number]  Encounter Provider: ANILA Davis  Encounter Date: 2024   Encounter department: [facility]    Assessment & Plan  Chills (without fever)    Orders:    POCT rapid flu A and B    POCT Rapid Covid Ag    Generalized body aches    Orders:    POCT rapid flu A and B    POCT Rapid Covid Ag    Chest congestion    Orders:    POCT rapid flu A and B    POCT Rapid Covid Ag    Nasal congestion    Orders:    POCT rapid flu A and B    POCT Rapid Covid Ag    Hematochezia  DW patient follow up with her GI specialist and ordered CBC and CMP to evaluate and also advised ED if having increase in pain or clots     Orders:    Comprehensive metabolic panel; Future    CBC and differential; Future    Viral upper respiratory tract infection  DW patient IH testing for Flu and COVID are negative and suspect viral. Advised supportive treatments             History of Present Illness     Chills  Generalized Body Aches  Nasal Congestion  chest congestion  Fatigue  Irritable Bowel Syndrome (yesterday had blood clots from rectum - some bleeding at 4 am from rectum)      Patient here today and reports that she started with acute illness yesterday and started with scratchy throat and soreness and went to sleep and stomach was hurting and had blood from her rectum and then this morning she had blood and clots in her stool and stomach cramping she is having lower back pain and legs are sore. Patient positive sick contacts with her grandson. Patient is having lower abdominal pain         History obtained from : patient  Review of Systems   Constitutional:  Positive for activity change, appetite change, chills and fatigue. Negative for diaphoresis and fever.   HENT:  Positive for congestion, postnasal drip, rhinorrhea and sore throat. Negative for mouth sores and trouble swallowing.    Eyes: Negative.    Respiratory:  Negative for  apnea, cough, choking, chest tightness, shortness of breath, wheezing and stridor.    Cardiovascular: Negative.    Gastrointestinal:  Positive for abdominal pain, blood in stool and diarrhea. Negative for abdominal distention, anal bleeding, constipation, nausea, rectal pain and vomiting.   Endocrine: Negative.    Genitourinary: Negative.    Musculoskeletal:  Positive for arthralgias and myalgias.   Skin: Negative.    Neurological:  Positive for headaches.   Hematological: Negative.    Psychiatric/Behavioral: Negative.       Pertinent Medical History         Medical History Reviewed by provider this encounter:  Problems       Past Medical History   Past Medical History:   Diagnosis Date    COVID-19 virus infection 3/19/2021    Headache(784.0)     Migraine     Nasal turbinate hypertrophy     OR correction today 2019    Tonsillitis, chronic     Wears glasses      Past Surgical History:   Procedure Laterality Date    APPENDECTOMY      BREAST BIOPSY Left 2020    benign     SECTION      COLONOSCOPY      MAMMO STEREOTACTIC BREAST BIOPSY LEFT (ALL INC) Left 2020    MYOMECTOMY N/A 10/17/2023    Procedure: MYOMECTOMY;  Surgeon: Rivka Sequeira MD;  [facility];  Service: Gynecology    [place] HYSTEROSCOPY BX ENDOMETRIUM&/POLYPC W/WO D&C N/A 10/17/2023    Procedure: DILATATION AND CURETTAGE (D&C) WITH HYSTEROSCOPY;  Surgeon: Rikva Sequeira MD;  [facility];  Service: Gynecology    MI HYSTEROSCOPY ENDOMETRIAL ABLATION N/A 10/17/2023    Procedure: ABLATION ENDOMETRIAL NOVASURE;  Surgeon: Rivka Sequeira MD;  [facility];  Service: Gynecology    TONSILLECTOMY Bilateral 2019    Procedure: TONSILLECTOMY;  Surgeon: Karan Santamaria DO;  [facility];  Service: ENT    TUBAL LIGATION      TURBINATE RESECTION N/A 2019    Procedure: PARTIAL INFERIOR TURBINECTOMY WITH OUTFRACTURE;  Surgeon: Karan Santamaria DO;  [facility];  Service: ENT  "    Family History   Problem Relation Age of Onset    No Known Problems Mother     No Known Problems Father     No Known Problems Daughter     No Known Problems Daughter     Cancer Maternal Grandmother     No Known Problems Maternal Grandfather     No Known Problems Paternal Grandmother     No Known Problems Paternal Grandfather     No Known Problems Maternal Aunt     Breast cancer Maternal Aunt 45    No Known Problems Maternal Aunt     Cancer Family     BRCA2 Positive Neg Hx     BRCA2 Negative Neg Hx     BRCA1 Positive Neg Hx     BRCA1 Negative Neg Hx     Ovarian cancer Neg Hx     Colon cancer Neg Hx      Current Outpatient Medications on File Prior to Visit   Medication Sig Dispense Refill    ibuprofen (MOTRIN) 600 mg tablet Take 1 tablet (600 mg total) by mouth every 6 (six) hours as needed for moderate pain 30 tablet 0    lubiprostone (AMITIZA) 24 mcg capsule Take 1 capsule (24 mcg total) by mouth 2 (two) times a day with meals 60 capsule 4    nystatin (MYCOSTATIN) ointment Apply topically 2 (two) times a day for 28 days (Patient taking differently: Apply topically if needed) 30 g 1    terconazole (TERAZOL 3) 0.8 % vaginal cream Insert 1 applicator into the vagina daily at bedtime 20 g 0     No current facility-administered medications on file prior to visit.   No Known Allergies       Objective     /78 (BP Location: Left arm, Patient Position: Sitting, Cuff Size: Large)   Pulse (!) 110   Temp 98.9 °F (37.2 °C)   Ht 5' 7\" (1.702 m)   Wt 107 kg (236 lb)   SpO2 97%   BMI 36.96 kg/m²     Physical Exam  Vitals reviewed.   Constitutional:       General: She is not in acute distress.     Appearance: Normal appearance. She is well-developed. She is ill-appearing.   HENT:      Head: Normocephalic and atraumatic.      Right Ear: Hearing and tympanic membrane normal.      Left Ear: Hearing and tympanic membrane normal.      Nose: Congestion and rhinorrhea present.      Mouth/Throat:      Mouth: Mucous " membranes are moist.   Eyes:      Pupils: Pupils are equal, round, and reactive to light.   Neck:      Thyroid: No thyromegaly.   Cardiovascular:      Rate and Rhythm: Normal rate and regular rhythm.      Heart sounds: Normal heart sounds. No murmur heard.  Pulmonary:      Effort: Pulmonary effort is normal. No respiratory distress.      Breath sounds: Normal breath sounds. No wheezing.   Abdominal:      General: Bowel sounds are normal.      Palpations: Abdomen is soft.      Tenderness: There is abdominal tenderness.      Comments: Mild lower abdominal tenderness   Musculoskeletal:         General: Normal range of motion.      Cervical back: Normal range of motion.      Right lower leg: No edema.      Left lower leg: No edema.   Skin:     General: Skin is warm and dry.   Neurological:      Mental Status: She is alert and oriented to person, place, and time.   Psychiatric:         Behavior: Behavior is cooperative.

## 2024-09-24 NOTE — ASSESSMENT & PLAN NOTE
DW patient IH testing for Flu and COVID are negative and suspect viral. Advised supportive treatments

## 2024-10-01 ENCOUNTER — APPOINTMENT (OUTPATIENT)
Dept: LAB | Facility: HOSPITAL | Age: 49
End: 2024-10-01
Payer: COMMERCIAL

## 2024-10-01 ENCOUNTER — TELEPHONE (OUTPATIENT)
Dept: FAMILY MEDICINE CLINIC | Facility: CLINIC | Age: 49
End: 2024-10-01

## 2024-10-01 DIAGNOSIS — R73.03 PREDIABETES: ICD-10-CM

## 2024-10-01 DIAGNOSIS — R51.9 CHRONIC DAILY HEADACHE: ICD-10-CM

## 2024-10-01 DIAGNOSIS — K92.1 HEMATOCHEZIA: ICD-10-CM

## 2024-10-01 LAB
ALBUMIN SERPL BCG-MCNC: 3.9 G/DL (ref 3.5–5)
ALP SERPL-CCNC: 74 U/L (ref 34–104)
ALT SERPL W P-5'-P-CCNC: 7 U/L (ref 7–52)
ANION GAP SERPL CALCULATED.3IONS-SCNC: 7 MMOL/L (ref 4–13)
AST SERPL W P-5'-P-CCNC: 10 U/L (ref 13–39)
BASOPHILS # BLD AUTO: 0.07 THOUSANDS/ÂΜL (ref 0–0.1)
BASOPHILS NFR BLD AUTO: 1 % (ref 0–1)
BILIRUB SERPL-MCNC: 0.28 MG/DL (ref 0.2–1)
BUN SERPL-MCNC: 17 MG/DL (ref 5–25)
CALCIUM SERPL-MCNC: 8.7 MG/DL (ref 8.4–10.2)
CHLORIDE SERPL-SCNC: 108 MMOL/L (ref 96–108)
CO2 SERPL-SCNC: 22 MMOL/L (ref 21–32)
CREAT SERPL-MCNC: 0.85 MG/DL (ref 0.6–1.3)
EOSINOPHIL # BLD AUTO: 0.25 THOUSAND/ÂΜL (ref 0–0.61)
EOSINOPHIL NFR BLD AUTO: 3 % (ref 0–6)
ERYTHROCYTE [DISTWIDTH] IN BLOOD BY AUTOMATED COUNT: 15 % (ref 11.6–15.1)
EST. AVERAGE GLUCOSE BLD GHB EST-MCNC: 143 MG/DL
GFR SERPL CREATININE-BSD FRML MDRD: 81 ML/MIN/1.73SQ M
GLUCOSE P FAST SERPL-MCNC: 116 MG/DL (ref 65–99)
HBA1C MFR BLD: 6.6 %
HCT VFR BLD AUTO: 37.3 % (ref 34.8–46.1)
HGB BLD-MCNC: 11.9 G/DL (ref 11.5–15.4)
IMM GRANULOCYTES # BLD AUTO: 0.04 THOUSAND/UL (ref 0–0.2)
IMM GRANULOCYTES NFR BLD AUTO: 0 % (ref 0–2)
LYMPHOCYTES # BLD AUTO: 2.61 THOUSANDS/ÂΜL (ref 0.6–4.47)
LYMPHOCYTES NFR BLD AUTO: 27 % (ref 14–44)
MCH RBC QN AUTO: 25.5 PG (ref 26.8–34.3)
MCHC RBC AUTO-ENTMCNC: 31.9 G/DL (ref 31.4–37.4)
MCV RBC AUTO: 80 FL (ref 82–98)
MONOCYTES # BLD AUTO: 0.59 THOUSAND/ÂΜL (ref 0.17–1.22)
MONOCYTES NFR BLD AUTO: 6 % (ref 4–12)
NEUTROPHILS # BLD AUTO: 6.18 THOUSANDS/ÂΜL (ref 1.85–7.62)
NEUTS SEG NFR BLD AUTO: 63 % (ref 43–75)
NRBC BLD AUTO-RTO: 0 /100 WBCS
PLATELET # BLD AUTO: 316 THOUSANDS/UL (ref 149–390)
PMV BLD AUTO: 9.9 FL (ref 8.9–12.7)
POTASSIUM SERPL-SCNC: 3.9 MMOL/L (ref 3.5–5.3)
PROT SERPL-MCNC: 7.2 G/DL (ref 6.4–8.4)
RBC # BLD AUTO: 4.67 MILLION/UL (ref 3.81–5.12)
SODIUM SERPL-SCNC: 137 MMOL/L (ref 135–147)
TSH SERPL DL<=0.05 MIU/L-ACNC: 1.96 UIU/ML (ref 0.45–4.5)
WBC # BLD AUTO: 9.74 THOUSAND/UL (ref 4.31–10.16)

## 2024-10-01 PROCEDURE — 83036 HEMOGLOBIN GLYCOSYLATED A1C: CPT

## 2024-10-01 PROCEDURE — 85025 COMPLETE CBC W/AUTO DIFF WBC: CPT

## 2024-10-01 PROCEDURE — 84443 ASSAY THYROID STIM HORMONE: CPT

## 2024-10-01 PROCEDURE — 80053 COMPREHEN METABOLIC PANEL: CPT

## 2024-10-01 PROCEDURE — 36415 COLL VENOUS BLD VENIPUNCTURE: CPT

## 2024-10-01 NOTE — TELEPHONE ENCOUNTER
----- Message from Nilay Eddy PA-C sent at 10/1/2024  3:32 PM EDT -----  Pt is diabetic with A1c 6.6 recommend OV to discuss tx

## 2024-10-04 ENCOUNTER — TELEPHONE (OUTPATIENT)
Age: 49
End: 2024-10-04

## 2024-10-04 ENCOUNTER — NURSE TRIAGE (OUTPATIENT)
Age: 49
End: 2024-10-04

## 2024-10-04 NOTE — TELEPHONE ENCOUNTER
"Has IBS with constipation.  Taking pill but last 2 weeks having bleeding from rectum.  States red blood every time she has bm as well as if she sits to urinate.  States in toilet.  Patient states she has hemorrhoids but they dont bleed.  Patient triaged but unable to give advise d/t length of time since seen.  Did advise patient to go to ED.  Patient refused.  Appointment made for 10/16/24.    Reason for Disposition   MILD rectal bleeding (more than just a few drops or streaks)    Answer Assessment - Initial Assessment Questions  1. APPEARANCE of BLOOD: \"What color is it?\" \"Is it passed separately, on the surface of the stool, or mixed in with the stool?\"       red  2. AMOUNT: \"How much blood was passed?\"       unsure  3. FREQUENCY: \"How many times has blood been passed with the stools?\"       Every bm and without bm  4. ONSET: \"When was the blood first seen in the stools?\" (Days or weeks)       2 weeks ago  5. DIARRHEA: \"Is there also some diarrhea?\" If Yes, ask: \"How many diarrhea stools were passed in past 24 hours?\"       denies  6. CONSTIPATION: \"Do you have constipation?\" If Yes, ask: \"How bad is it?\"      denies  7. RECURRENT SYMPTOMS: \"Have you had blood in your stools before?\" If Yes, ask: \"When was the last time?\" and \"What happened that time?\"       denies    Protocols used: Rectal Bleeding-ADULT-[place]    "

## 2024-10-04 NOTE — TELEPHONE ENCOUNTER
Pt called to schedule an appt due to rectal bleeding. Offered first available appt 11-7-2024 but was denied. Warm transferred to triage nurse but pt ended the call.

## 2024-10-04 NOTE — TELEPHONE ENCOUNTER
Patient contacted office with complaints of rectal bleeding. Call transferred to nurse triage to further assist.

## 2024-10-10 ENCOUNTER — OFFICE VISIT (OUTPATIENT)
Dept: GASTROENTEROLOGY | Facility: CLINIC | Age: 49
End: 2024-10-10
Payer: COMMERCIAL

## 2024-10-10 VITALS
DIASTOLIC BLOOD PRESSURE: 76 MMHG | OXYGEN SATURATION: 99 % | HEIGHT: 66 IN | WEIGHT: 235 LBS | SYSTOLIC BLOOD PRESSURE: 110 MMHG | BODY MASS INDEX: 37.77 KG/M2 | TEMPERATURE: 97.2 F | HEART RATE: 78 BPM

## 2024-10-10 DIAGNOSIS — K64.9 HEMORRHOIDS, UNSPECIFIED HEMORRHOID TYPE: ICD-10-CM

## 2024-10-10 DIAGNOSIS — K58.1 IRRITABLE BOWEL SYNDROME WITH CONSTIPATION: Primary | ICD-10-CM

## 2024-10-10 PROCEDURE — 99214 OFFICE O/P EST MOD 30 MIN: CPT | Performed by: PHYSICIAN ASSISTANT

## 2024-10-10 RX ORDER — LINACLOTIDE 72 UG/1
72 CAPSULE, GELATIN COATED ORAL DAILY
Qty: 90 CAPSULE | Refills: 2 | Status: SHIPPED | OUTPATIENT
Start: 2024-10-10

## 2024-10-10 NOTE — PROGRESS NOTES
[facility] - Outpatient Follow-up Note  Heidi Wheatley 48 y.o. female MRN: [id number]  Encounter: [id number]          ASSESSMENT AND PLAN:      1. Irritable bowel syndrome with constipation  2. Hemorrhoids    Patient presents for follow up of her IBS-C and hemorrhoids.  Colonoscopy in 12/2022 was normal except for internal hemorrhoids.  She reports struggling with constipation despite taking the Amitiza 24mcg dose.  She previously had insufficient relief with Miralax as well.   She reports recent rectal bleeding x couple weeks which has stopped at this time. Recent HGB normal at 11.9 on 10/1.    We reviewed starting Linzess for her IBS-C.    Will start Linzess 72mcg po daily (she prefers to start at the lowest dose - we reviewed that we can increase the dose if needed).  Recommend a fiber supplement like Benefiber daily as well.  Proctofoam course prescribed for her hemorrhoidal bleeding if it recurs.      ______________________________________________________________________    SUBJECTIVE:  Patient is a pleasant 48-year-old female who presents to the office for follow-up of her IBS-C and hemorrhoids.  Patient reports over the past month having more issues with constipation.  She has been on Amitiza which in the past was working well for her but now is no longer sufficient.  She reports having bright red rectal bleeding for about 2 weeks which has since stopped.  She had a prior colonoscopy less than 2 years ago in December 2022 which was normal except for internal hemorrhoids.  No family history of colon cancer.  No unintentional weight loss.  She does have a prior history of hemorrhoidal bleeding in the past as well.  She previously failed MiraLAX for her constipation as well.  She reports she has gone about a week without a bowel movement and plans to give herself a bowel prep tomorrow for relief as she will be off of work.  After this, we will then start the regular bowel regimen  as noted above.       REVIEW OF SYSTEMS IS OTHERWISE NEGATIVE.      Historical Information   Past Medical History:   Diagnosis Date    COVID-19 virus infection 3/19/2021    Headache(784.0)     Migraine     Nasal turbinate hypertrophy     OR correction today 2019    Tonsillitis, chronic     Wears glasses      Past Surgical History:   Procedure Laterality Date    APPENDECTOMY      BREAST BIOPSY Left 2020    benign     SECTION      COLONOSCOPY      MAMMO STEREOTACTIC BREAST BIOPSY LEFT (ALL INC) Left 2020    MYOMECTOMY N/A 10/17/2023    Procedure: MYOMECTOMY;  Surgeon: Rivka Sequeira MD;  Location: [facility];  Service: Gynecology    DE HYSTEROSCOPY BX ENDOMETRIUM&/POLYPC W/WO D&C N/A 10/17/2023    Procedure: DILATATION AND CURETTAGE (D&C) WITH HYSTEROSCOPY;  Surgeon: Rivka Sequeira MD;  Location: [facility];  Service: Gynecology    DE HYSTEROSCOPY ENDOMETRIAL ABLATION N/A 10/17/2023    Procedure: ABLATION ENDOMETRIAL NOVASURE;  Surgeon: Rivka Sequeira MD;  Location: [facility];  Service: Gynecology    TONSILLECTOMY Bilateral 2019    Procedure: TONSILLECTOMY;  Surgeon: Karan Santamaria DO;  Location: [facility];  Service: ENT    TUBAL LIGATION      TURBINATE RESECTION N/A 2019    Procedure: PARTIAL INFERIOR TURBINECTOMY WITH OUTFRACTURE;  Surgeon: Karan Santamaria DO;  Location: [facility];  Service: ENT     Social History   Social History     Substance and Sexual Activity   Alcohol Use Yes    Alcohol/week: 1.0 - 2.0 standard drink of alcohol    Types: 1 - 2 Glasses of wine per week    Comment: Social      Social History     Substance and Sexual Activity   Drug Use No     Social History     Tobacco Use   Smoking Status Never   Smokeless Tobacco Never     Family History   Problem Relation Age of Onset    No Known Problems Mother     No Known Problems Father     No Known Problems Daughter     No Known Problems Daughter     Cancer Maternal Grandmother     No Known  "Problems Maternal Grandfather     No Known Problems Paternal Grandmother     No Known Problems Paternal Grandfather     No Known Problems Maternal Aunt     Breast cancer Maternal Aunt 45    No Known Problems Maternal Aunt     Cancer Family     BRCA2 Positive Neg Hx     BRCA2 Negative Neg Hx     BRCA1 Positive Neg Hx     BRCA1 Negative Neg Hx     Ovarian cancer Neg Hx     Colon cancer Neg Hx        Meds/Allergies       Current Outpatient Medications:     hydrocortisone-pramoxine (PROCTOFOAM-HC) 1-1 % FOAM rectal foam    ibuprofen (MOTRIN) 600 mg tablet    linaCLOtide (Linzess) 72 MCG CAPS    nystatin (MYCOSTATIN) ointment    terconazole (TERAZOL 3) 0.8 % vaginal cream    No Known Allergies        Objective     Blood pressure 110/76, pulse 78, temperature (!) 97.2 °F (36.2 °C), temperature source Temporal, height 5' 6\" (1.676 m), weight 107 kg (235 lb), SpO2 99%, not currently breastfeeding. Body mass index is 37.93 kg/m².      PHYSICAL EXAM:      General Appearance:   Alert, cooperative, no distress   HEENT:   Normocephalic, atraumatic, anicteric.     Neck:  Supple, symmetrical, trachea midline   Lungs:   Clear to auscultation bilaterally; no rales, rhonchi or wheezing; respirations unlabored    Heart::   Regular rate and rhythm; no murmur, rub, or gallop.   Abdomen:   Soft, non-tender, non-distended; normal bowel sounds; no masses, no organomegaly    Genitalia:   Deferred    Rectal:   Deferred    Extremities:  No cyanosis, clubbing or edema    Pulses:  2+ and symmetric    Skin:  No jaundice, rashes, or lesions    Lymph nodes:  No palpable cervical lymphadenopathy        Lab Results:   No visits with results within 1 Day(s) from this visit.   Latest known visit with results is:   Appointment on 10/01/2024   Component Date Value    Hemoglobin A1C 10/01/2024 6.6 (H)     EAG 10/01/2024 143     TSH 3RD GENERATON 10/01/2024 1.957     Sodium 10/01/2024 137     Potassium 10/01/2024 3.9     Chloride 10/01/2024 108     CO2 " 10/01/2024 22     ANION GAP 10/01/2024 7     BUN 10/01/2024 17     Creatinine 10/01/2024 0.85     Glucose, Fasting 10/01/2024 116 (H)     Calcium 10/01/2024 8.7     AST 10/01/2024 10 (L)     ALT 10/01/2024 7     Alkaline Phosphatase 10/01/2024 74     Total Protein 10/01/2024 7.2     Albumin 10/01/2024 3.9     Total Bilirubin 10/01/2024 0.28     eGFR 10/01/2024 81     WBC 10/01/2024 9.74     RBC 10/01/2024 4.67     Hemoglobin 10/01/2024 11.9     Hematocrit 10/01/2024 37.3     MCV 10/01/2024 80 (L)     MCH 10/01/2024 25.5 (L)     MCHC 10/01/2024 31.9     RDW 10/01/2024 15.0     MPV 10/01/2024 9.9     Platelets 10/01/2024 316     nRBC 10/01/2024 0     Segmented % 10/01/2024 63     Immature Grans % 10/01/2024 0     Lymphocytes % 10/01/2024 27     Monocytes % 10/01/2024 6     Eosinophils Relative 10/01/2024 3     Basophils Relative 10/01/2024 1     Absolute Neutrophils 10/01/2024 6.18     Absolute Immature Grans 10/01/2024 0.04     Absolute Lymphocytes 10/01/2024 2.61     Absolute Monocytes 10/01/2024 0.59     Eosinophils Absolute 10/01/2024 0.25     Basophils Absolute 10/01/2024 0.07          Radiology Results:   No results found.

## 2024-10-24 PROBLEM — J06.9 VIRAL UPPER RESPIRATORY TRACT INFECTION: Status: RESOLVED | Noted: 2024-09-24 | Resolved: 2024-10-24

## 2024-12-23 ENCOUNTER — PATIENT MESSAGE (OUTPATIENT)
Dept: OBGYN CLINIC | Facility: CLINIC | Age: 49
End: 2024-12-23

## 2024-12-23 ENCOUNTER — HOSPITAL ENCOUNTER (OUTPATIENT)
Dept: MAMMOGRAPHY | Facility: CLINIC | Age: 49
Discharge: HOME | End: 2024-12-23
Payer: COMMERCIAL

## 2024-12-23 VITALS — BODY MASS INDEX: 37.77 KG/M2 | HEIGHT: 66 IN | WEIGHT: 235 LBS

## 2024-12-23 DIAGNOSIS — Z12.31 VISIT FOR SCREENING MAMMOGRAM: ICD-10-CM

## 2024-12-23 DIAGNOSIS — R32 URINARY INCONTINENCE IN FEMALE: Primary | ICD-10-CM

## 2024-12-23 PROCEDURE — 77063 BREAST TOMOSYNTHESIS BI: CPT

## 2024-12-23 PROCEDURE — 77067 SCR MAMMO BI INCL CAD: CPT

## 2024-12-24 ENCOUNTER — PATIENT MESSAGE (OUTPATIENT)
Dept: OBGYN CLINIC | Facility: CLINIC | Age: 49
End: 2024-12-24

## 2024-12-24 DIAGNOSIS — N81.89 PELVIC FLOOR WEAKNESS: ICD-10-CM

## 2024-12-24 DIAGNOSIS — N39.46 MIXED STRESS AND URGE URINARY INCONTINENCE: Primary | ICD-10-CM

## 2024-12-24 DIAGNOSIS — R21 SKIN RASH: Primary | ICD-10-CM

## 2024-12-24 DIAGNOSIS — N39.3 STRESS INCONTINENCE: ICD-10-CM

## 2024-12-24 RX ORDER — TRIAMCINOLONE ACETONIDE 5 MG/G
OINTMENT TOPICAL 2 TIMES DAILY
Qty: 15 G | Refills: 2 | Status: SHIPPED | OUTPATIENT
Start: 2024-12-24

## 2024-12-24 NOTE — PATIENT COMMUNICATION
Please order urinalysis with relflex culture. Most likely not an infection due to the length of time the symptom has been occurring.  One option is physical therapy for the pelvic floor.  Or referral to urology for evaluation of the bladder.

## 2024-12-27 ENCOUNTER — APPOINTMENT (OUTPATIENT)
Dept: LAB | Facility: HOSPITAL | Age: 49
End: 2024-12-27
Attending: OBSTETRICS & GYNECOLOGY
Payer: COMMERCIAL

## 2024-12-27 DIAGNOSIS — R32 URINARY INCONTINENCE IN FEMALE: ICD-10-CM

## 2024-12-27 LAB
BILIRUB UR QL STRIP: NEGATIVE
CLARITY UR: CLEAR
COLOR UR: NORMAL
GLUCOSE UR STRIP-MCNC: NEGATIVE MG/DL
HGB UR QL STRIP.AUTO: NEGATIVE
KETONES UR STRIP-MCNC: NEGATIVE MG/DL
LEUKOCYTE ESTERASE UR QL STRIP: NEGATIVE
NITRITE UR QL STRIP: NEGATIVE
PH UR STRIP.AUTO: 6 [PH]
PROT UR STRIP-MCNC: NEGATIVE MG/DL
SP GR UR STRIP.AUTO: 1.01 (ref 1–1.03)
UROBILINOGEN UR STRIP-ACNC: <2 MG/DL

## 2024-12-27 PROCEDURE — 81003 URINALYSIS AUTO W/O SCOPE: CPT

## 2024-12-29 ENCOUNTER — RESULTS FOLLOW-UP (OUTPATIENT)
Dept: LABOR AND DELIVERY | Facility: HOSPITAL | Age: 49
End: 2024-12-29

## 2025-03-28 ENCOUNTER — ANNUAL EXAM (OUTPATIENT)
Dept: OBGYN CLINIC | Facility: CLINIC | Age: 50
End: 2025-03-28
Payer: COMMERCIAL

## 2025-03-28 VITALS
HEIGHT: 66 IN | BODY MASS INDEX: 39.21 KG/M2 | SYSTOLIC BLOOD PRESSURE: 128 MMHG | WEIGHT: 244 LBS | DIASTOLIC BLOOD PRESSURE: 84 MMHG

## 2025-03-28 DIAGNOSIS — Z11.3 SCREEN FOR STD (SEXUALLY TRANSMITTED DISEASE): ICD-10-CM

## 2025-03-28 DIAGNOSIS — K62.5 RECTAL BLEEDING: ICD-10-CM

## 2025-03-28 DIAGNOSIS — R10.2 PELVIC PAIN: ICD-10-CM

## 2025-03-28 DIAGNOSIS — Z01.419 ENCOUNTER FOR ANNUAL ROUTINE GYNECOLOGICAL EXAMINATION: Primary | ICD-10-CM

## 2025-03-28 DIAGNOSIS — N39.41 URGE INCONTINENCE: ICD-10-CM

## 2025-03-28 DIAGNOSIS — R92.333 HETEROGENEOUSLY DENSE TISSUE OF BOTH BREASTS ON MAMMOGRAPHY: ICD-10-CM

## 2025-03-28 DIAGNOSIS — Z12.39 BREAST CANCER SCREENING OTHER THAN MAMMOGRAM: ICD-10-CM

## 2025-03-28 PROCEDURE — 87491 CHLMYD TRACH DNA AMP PROBE: CPT

## 2025-03-28 PROCEDURE — S0612 ANNUAL GYNECOLOGICAL EXAMINA: HCPCS

## 2025-03-28 PROCEDURE — 87591 N.GONORRHOEAE DNA AMP PROB: CPT

## 2025-03-28 NOTE — PROGRESS NOTES
Name: Heidi Wheatley      : 1975      MRN: [id number]  Encounter Provider: Karen Madrigal PA-C  Encounter Date: 3/28/2025   Encounter department: [facility]  :  Assessment & Plan  Encounter for annual routine gynecological examination     Calcium 1000 mg (in divided doses-max 600 mg at one time) + 600-1000 IU Vit D daily.   Pap with high risk HPV Q 5 years, if normal.   HPV 9 vaccine recommended through age 45. Check with your insurance for coverage. If covered, call office to schedule start of vaccine series.   Annual mammogram ordered.  Monthly breast self exam encouraged.  Call your insurance company to verify coverage prior to completing any ordered tests.   Exercise 150-300 minutes per week minimum.   Colon cancer screening   Kegels 20 times twice daily. Use silicone based lubricant with sex as needed. (Water based only for use with condoms or sexual toys.)  Return to office in one year or sooner, if needed.      Rectal bleeding    Referral to GI, patient requesting specific provider, referral placed for requested provider  CBC  Continue to manage IBS-C and hemorrhoids  Encouraged good hydration and fiber intake  Reviewed signs and symptoms of when to go to the ER    Screen for STD (sexually transmitted disease)    Orders:    Chlamydia/GC amplified DNA by PCR    Urge incontinence  Pelvic floor physical therapy ordered  Orders:    Ambulatory Referral to Physical Therapy; Future    Breast cancer screening other than mammogram  I reviewed with the patient her mammogram findings which include heterogeneously dense breast and an intermediate lifetime Tyrer-Cuzick score.  I explained to her that women who have dense breasts may have small masses that are obscured on mammogram and as a result I recommend additional screening with an ABUS.  I recommend her to contact her insurance provider prior to having this done  Orders:    US breast screening bilateral  complete (ABUS); Future    Heterogeneously dense tissue of both breasts on mammography  See breast cancer screening other than mammogram A/P  Orders:    US breast screening bilateral complete (ABUS); Future    Pelvic pain    Pelvic ultrasound ordered  Ibuprofen and warm compress  Reviewed signs and symptoms of when to go to the ER          History of Present Illness   HPI  Heidi Wheatley is a 49 y.o. female who presents for annual exam      No bleeding since ablation    Vasomotor symptoms No  Sexually active yes Monogamous   Birth control  Yes female sterilization  History of abnormal pap: No  Last pap 2022 , Findings NILM/HPV negative , Next pap:   Self breast exam no  Mammogram 2024 BI-RADS 1 lifetime Tyrer-Cuzick 17.22% heterogeneously dense next Mammogram ordered  Colon Cancer screenin2022 , type colonoscopy, follow up 10 years    Urinary incontinence- she has been wearing pad due to urinary leakage since the procedure. She feels the urge and the leakage will occur if she katarina not go to the bathroom. Does not occur with stress. Denies any other urinary sx    Patient notes pain in her pelvic area when having bowel movements.  Was tender to palpation during physical exam.  Patient would also like follow-up imaging due to a finding of fibroids during her ablation procedure.    Rectal bleeding on/off for over a year. Patient endorses history of IBS-C and a hemorrhoids. She states she is taking her IBS medications regularly. She called GI and they stated it might be the hemorrhoids and was prescribed linzess, she does not strain as much anymore. She states sometimes the toilet water will turn red. She also states that if she passes gas and she wipes there will be blood. She has not followed up with GI since. No abdominal pain unless she has an IBS flair up. Stools have been brown with streaking of blood      Hereditary Cancer Screening  FH Breast cancer: maternal aunt  FH Ovarian  cancer: none  FH Uterine cancer: none  FH Colon cancer: none  Cancer-related family history includes Breast cancer (age of onset: 45) in her maternal aunt; Cancer in her family and maternal grandmother. There is no history of Ovarian cancer or Colon cancer.       Review of Systems   Gastrointestinal:  Positive for abdominal pain, anal bleeding, blood in stool and constipation. Negative for diarrhea, nausea and vomiting.   Genitourinary:  Negative for difficulty urinating, dyspareunia, dysuria, frequency, hematuria, pelvic pain, urgency, vaginal bleeding, vaginal discharge and vaginal pain.   Neurological:  Negative for dizziness, syncope, light-headedness and headaches.     Current Outpatient Medications on File Prior to Visit   Medication Sig Dispense Refill    ibuprofen (MOTRIN) 600 mg tablet Take 1 tablet (600 mg total) by mouth every 6 (six) hours as needed for moderate pain 30 tablet 0    linaCLOtide (Linzess) 72 MCG CAPS Take 72 mcg by mouth in the morning 90 capsule 2    triamcinolone (KENALOG) 0.5 % ointment Apply topically 2 (two) times a day 15 g 2    nystatin (MYCOSTATIN) ointment Apply topically 2 (two) times a day for 28 days (Patient taking differently: Apply topically if needed prn) 30 g 1    [DISCONTINUED] hydrocortisone-pramoxine (PROCTOFOAM-HC) 1-1 % FOAM rectal foam Insert 1 applicator into the rectum 2 (two) times a day (Patient not taking: Reported on 3/28/2025) 10 g 2    [DISCONTINUED] terconazole (TERAZOL 3) 0.8 % vaginal cream Insert 1 applicator into the vagina daily at bedtime (Patient not taking: Reported on 10/10/2024) 20 g 0     No current facility-administered medications on file prior to visit.      Social History     Tobacco Use    Smoking status: Never    Smokeless tobacco: Never   Vaping Use    Vaping status: Never Used   Substance and Sexual Activity    Alcohol use: Yes     Alcohol/week: 1.0 - 2.0 standard drink of alcohol     Types: 1 - 2 Glasses of wine per week     Comment:  "Social     Drug use: No    Sexual activity: Yes     Partners: Male     Birth control/protection: Female Sterilization        Objective   /84 (BP Location: Left arm, Patient Position: Sitting, Cuff Size: Large)   Ht 5' 6\" (1.676 m)   Wt 111 kg (244 lb)   LMP 10/14/2023 (Exact Date)   BMI 39.38 kg/m²      Physical Exam  Vitals and nursing note reviewed.   Constitutional:       General: She is not in acute distress.     Appearance: She is well-developed.   HENT:      Head: Normocephalic and atraumatic.   Eyes:      Extraocular Movements: Extraocular movements intact.   Pulmonary:      Effort: Pulmonary effort is normal.   Chest:   Breasts:     Right: No swelling, bleeding, inverted nipple, mass, nipple discharge, skin change or tenderness.      Left: No swelling, bleeding, inverted nipple, mass, nipple discharge, skin change or tenderness.   Abdominal:      Palpations: Abdomen is soft.      Tenderness: There is abdominal tenderness. There is no guarding or rebound. Negative signs include Rovsing's sign and McBurney's sign.      Hernia: There is no hernia in the left inguinal area or right inguinal area.       Genitourinary:     General: Normal vulva.      Exam position: Lithotomy position.      Pubic Area: No rash.       Labia:         Right: No rash, tenderness or lesion.         Left: No rash, tenderness or lesion.       Urethra: No urethral pain or urethral swelling.      Vagina: No vaginal discharge, erythema, tenderness, bleeding or lesions.      Cervix: No cervical motion tenderness, discharge, friability, lesion, erythema or cervical bleeding.      Uterus: Not enlarged, not fixed and not tender.       Adnexa:         Right: No mass, tenderness or fullness.          Left: No mass, tenderness or fullness.        Rectum: External hemorrhoid present. No anal fissure.      Comments: No blood, tenderness or fissures noted on external examination of the rectum  Lymphadenopathy:      Upper Body:      Right " upper body: No supraclavicular, axillary or pectoral adenopathy.      Left upper body: No supraclavicular, axillary or pectoral adenopathy.      Lower Body: No right inguinal adenopathy. No left inguinal adenopathy.   Skin:     General: Skin is warm and dry.   Neurological:      Mental Status: She is alert.   Psychiatric:         Mood and Affect: Mood normal.         Behavior: Behavior normal.         Karen Madrigal PA-C

## 2025-03-31 ENCOUNTER — PATIENT MESSAGE (OUTPATIENT)
Dept: FAMILY MEDICINE CLINIC | Facility: CLINIC | Age: 50
End: 2025-03-31

## 2025-04-01 LAB
C TRACH DNA SPEC QL NAA+PROBE: NEGATIVE
N GONORRHOEA DNA SPEC QL NAA+PROBE: NEGATIVE

## 2025-04-02 ENCOUNTER — RESULTS FOLLOW-UP (OUTPATIENT)
Age: 50
End: 2025-04-02

## 2025-04-02 NOTE — PATIENT COMMUNICATION
Pt called today to schedule an appointment to discuss labs done back in October. Pt also states she is experiencing fatigue, brain fog and headaches every day. Pt requesting an appointment with any provider after 3:30 pm.      Pt scheduled for this Friday, 4/4/25 at 3:40 pm with Nil[facility]. No appointments available with PCP. Pt will bring along insurance cards and photo ID to appointment.

## 2025-04-03 ENCOUNTER — RESULTS FOLLOW-UP (OUTPATIENT)
Dept: FAMILY MEDICINE CLINIC | Facility: CLINIC | Age: 50
End: 2025-04-03

## 2025-04-03 ENCOUNTER — APPOINTMENT (OUTPATIENT)
Dept: LAB | Facility: HOSPITAL | Age: 50
End: 2025-04-03
Payer: COMMERCIAL

## 2025-04-03 DIAGNOSIS — R51.9 CHRONIC DAILY HEADACHE: ICD-10-CM

## 2025-04-03 LAB
ALBUMIN SERPL BCG-MCNC: 3.8 G/DL (ref 3.5–5)
ALP SERPL-CCNC: 79 U/L (ref 34–104)
ALT SERPL W P-5'-P-CCNC: 8 U/L (ref 7–52)
ANION GAP SERPL CALCULATED.3IONS-SCNC: 5 MMOL/L (ref 4–13)
AST SERPL W P-5'-P-CCNC: 10 U/L (ref 13–39)
BASOPHILS # BLD AUTO: 0.05 THOUSANDS/ÂΜL (ref 0–0.1)
BASOPHILS NFR BLD AUTO: 1 % (ref 0–1)
BILIRUB SERPL-MCNC: 0.37 MG/DL (ref 0.2–1)
BUN SERPL-MCNC: 10 MG/DL (ref 5–25)
CALCIUM SERPL-MCNC: 8.9 MG/DL (ref 8.4–10.2)
CHLORIDE SERPL-SCNC: 106 MMOL/L (ref 96–108)
CO2 SERPL-SCNC: 27 MMOL/L (ref 21–32)
CREAT SERPL-MCNC: 0.8 MG/DL (ref 0.6–1.3)
EOSINOPHIL # BLD AUTO: 0.19 THOUSAND/ÂΜL (ref 0–0.61)
EOSINOPHIL NFR BLD AUTO: 2 % (ref 0–6)
ERYTHROCYTE [DISTWIDTH] IN BLOOD BY AUTOMATED COUNT: 15.1 % (ref 11.6–15.1)
GFR SERPL CREATININE-BSD FRML MDRD: 87 ML/MIN/1.73SQ M
GLUCOSE P FAST SERPL-MCNC: 108 MG/DL (ref 65–99)
HCT VFR BLD AUTO: 38.2 % (ref 34.8–46.1)
HGB BLD-MCNC: 12 G/DL (ref 11.5–15.4)
IMM GRANULOCYTES # BLD AUTO: 0.03 THOUSAND/UL (ref 0–0.2)
IMM GRANULOCYTES NFR BLD AUTO: 0 % (ref 0–2)
LYMPHOCYTES # BLD AUTO: 2.43 THOUSANDS/ÂΜL (ref 0.6–4.47)
LYMPHOCYTES NFR BLD AUTO: 23 % (ref 14–44)
MCH RBC QN AUTO: 25.6 PG (ref 26.8–34.3)
MCHC RBC AUTO-ENTMCNC: 31.4 G/DL (ref 31.4–37.4)
MCV RBC AUTO: 81 FL (ref 82–98)
MONOCYTES # BLD AUTO: 0.61 THOUSAND/ÂΜL (ref 0.17–1.22)
MONOCYTES NFR BLD AUTO: 6 % (ref 4–12)
NEUTROPHILS # BLD AUTO: 7.07 THOUSANDS/ÂΜL (ref 1.85–7.62)
NEUTS SEG NFR BLD AUTO: 68 % (ref 43–75)
NRBC BLD AUTO-RTO: 0 /100 WBCS
PLATELET # BLD AUTO: 299 THOUSANDS/UL (ref 149–390)
PMV BLD AUTO: 11.3 FL (ref 8.9–12.7)
POTASSIUM SERPL-SCNC: 4.1 MMOL/L (ref 3.5–5.3)
PROT SERPL-MCNC: 6.8 G/DL (ref 6.4–8.4)
RBC # BLD AUTO: 4.69 MILLION/UL (ref 3.81–5.12)
SODIUM SERPL-SCNC: 138 MMOL/L (ref 135–147)
WBC # BLD AUTO: 10.38 THOUSAND/UL (ref 4.31–10.16)

## 2025-04-03 PROCEDURE — 85025 COMPLETE CBC W/AUTO DIFF WBC: CPT

## 2025-04-03 PROCEDURE — 36415 COLL VENOUS BLD VENIPUNCTURE: CPT

## 2025-04-03 PROCEDURE — 80053 COMPREHEN METABOLIC PANEL: CPT

## 2025-04-04 ENCOUNTER — OFFICE VISIT (OUTPATIENT)
Dept: FAMILY MEDICINE CLINIC | Facility: CLINIC | Age: 50
End: 2025-04-04
Payer: COMMERCIAL

## 2025-04-04 VITALS
WEIGHT: 242 LBS | BODY MASS INDEX: 38.89 KG/M2 | OXYGEN SATURATION: 99 % | DIASTOLIC BLOOD PRESSURE: 88 MMHG | HEIGHT: 66 IN | SYSTOLIC BLOOD PRESSURE: 132 MMHG | TEMPERATURE: 97.7 F | HEART RATE: 82 BPM

## 2025-04-04 DIAGNOSIS — Z91.89 RISK FACTORS FOR OBSTRUCTIVE SLEEP APNEA: ICD-10-CM

## 2025-04-04 DIAGNOSIS — R51.9 CHRONIC DAILY HEADACHE: Primary | ICD-10-CM

## 2025-04-04 DIAGNOSIS — R53.82 CHRONIC FATIGUE: ICD-10-CM

## 2025-04-04 DIAGNOSIS — R73.03 PREDIABETES: ICD-10-CM

## 2025-04-04 PROCEDURE — 99214 OFFICE O/P EST MOD 30 MIN: CPT | Performed by: PHYSICIAN ASSISTANT

## 2025-04-04 NOTE — PROGRESS NOTES
Name: Heidi Wheatley      : 1975      MRN: [id number]  Encounter Provider: Nilay Eddy PA-C  Encounter Date: 2025   Encounter department: [facility]    Assessment & Plan  Chronic daily headache  Recommend labs, MRI as below  Prn otc pain relief  Did bring up the idea of HA prophylaxis, though pt defers  Continue with eye doctor  Normal neurologic exam. Normotensive BP  Orders:  •  TSH, 3rd generation with Free T4 reflex; Future  •  Vitamin B12; Future  •  Vitamin D 25 hydroxy; Future  •  Lyme Total AB W Reflex to IGM/IGG; Future  •  MRI brain wo contrast; Future    Chronic fatigue  Recommend sleep study, labs  Orders:  •  TSH, 3rd generation with Free T4 reflex; Future  •  Vitamin B12; Future  •  Vitamin D 25 hydroxy; Future  •  Lyme Total AB W Reflex to IGM/IGG; Future  •  Ambulatory Referral to Sleep Medicine; Future    Risk factors for obstructive sleep apnea    Orders:  •  Ambulatory Referral to Sleep Medicine; Future    Prediabetes  Recheck a1c  Orders:  •  Hemoglobin A1C; Future         History of Present Illness     Pt presents for follow up    Continues to note chronic near daily dull HA. This was discussed last year as well. Dull global ache. Different than her migraines. Also notes chronically blurry vision. She does see an eye doctor and wears glasses. She also notes chronic daily fatigue to where she can nearly fall asleep. This seems to be worse after eating. No other neurologic complaints.       Review of Systems   Constitutional:  Positive for fatigue. Negative for chills and fever.   HENT:  Negative for congestion, ear pain, hearing loss, nosebleeds, postnasal drip, rhinorrhea, sinus pressure, sinus pain, sneezing and sore throat.    Eyes:  Negative for pain, discharge, itching and visual disturbance.   Respiratory:  Negative for cough, chest tightness, shortness of breath and wheezing.    Cardiovascular:  Negative for chest pain, palpitations and leg  swelling.   Gastrointestinal:  Negative for abdominal pain, blood in stool, constipation, diarrhea, nausea and vomiting.   Genitourinary:  Negative for frequency and urgency.   Neurological:  Positive for headaches. Negative for dizziness, light-headedness and numbness.     Past Medical History:   Diagnosis Date   • COVID-19 virus infection 3/19/2021   • Headache(784.0)    • Migraine    • Nasal turbinate hypertrophy     OR correction today 2019   • Tonsillitis, chronic    • Wears glasses      Past Surgical History:   Procedure Laterality Date   • APPENDECTOMY     • BREAST BIOPSY Left 2020    benign   •  SECTION     • COLONOSCOPY     • MAMMO STEREOTACTIC BREAST BIOPSY LEFT (ALL INC) Left 2020   • MYOMECTOMY N/A 10/17/2023    Procedure: MYOMECTOMY;  Surgeon: Rivka Sequeira MD;  [facility];  Service: Gynecology   • CT HYSTEROSCOPY BX ENDOMETRIUM&/POLYPC W/WO D&C N/A 10/17/2023    Procedure: DILATATION AND CURETTAGE (D&C) WITH HYSTEROSCOPY;  Surgeon: Rivka Sequeira MD;  [facility];  Service: Gynecology   • CT HYSTEROSCOPY ENDOMETRIAL ABLATION N/A 10/17/2023    Procedure: ABLATION ENDOMETRIAL NOVASURE;  Surgeon: Rivka Sequeira MD;  [facility];  Service: Gynecology   • TONSILLECTOMY Bilateral 2019    Procedure: TONSILLECTOMY;  Surgeon: Karan Santamaria DO;  [facility];  Service: ENT   • TUBAL LIGATION     • TURBINATE RESECTION N/A 2019    Procedure: PARTIAL INFERIOR TURBINECTOMY WITH OUTFRACTURE;  Surgeon: Karan Santamaria DO;  [facility];  Service: ENT     Family History   Problem Relation Age of Onset   • No Known Problems Mother    • No Known Problems Father    • No Known Problems Daughter    • No Known Problems Daughter    • Cancer Maternal Grandmother    • No Known Problems Maternal Grandfather    • No Known Problems Paternal Grandmother    • No Known Problems Paternal Grandfather    • No Known Problems Maternal Aunt  "   • Breast cancer Maternal Aunt 45   • No Known Problems Maternal Aunt    • Cancer Family    • BRCA2 Positive Neg Hx    • BRCA2 Negative Neg Hx    • BRCA1 Positive Neg Hx    • BRCA1 Negative Neg Hx    • Ovarian cancer Neg Hx    • Colon cancer Neg Hx      Social History     Tobacco Use   • Smoking status: Never   • Smokeless tobacco: Never   Vaping Use   • Vaping status: Never Used   Substance and Sexual Activity   • Alcohol use: Yes     Alcohol/week: 1.0 - 2.0 standard drink of alcohol     Types: 1 - 2 Glasses of wine per week     Comment: Social    • Drug use: No   • Sexual activity: Yes     Partners: Male     Birth control/protection: Female Sterilization     Current Outpatient Medications on File Prior to Visit   Medication Sig   • linaCLOtide (Linzess) 72 MCG CAPS Take 72 mcg by mouth in the morning   • nystatin (MYCOSTATIN) ointment Apply topically 2 (two) times a day for 28 days (Patient taking differently: Apply topically if needed prn)   • triamcinolone (KENALOG) 0.5 % ointment Apply topically 2 (two) times a day   • [DISCONTINUED] ibuprofen (MOTRIN) 600 mg tablet Take 1 tablet (600 mg total) by mouth every 6 (six) hours as needed for moderate pain     No Known Allergies  Immunization History   Administered Date(s) Administered   • COVID-19 PFIZER VACCINE 0.3 ML IM 08/24/2021, 09/14/2021   • INFLUENZA 11/09/2021   • Influenza, injectable, quadrivalent, preservative free 0.5 mL 11/24/2020     Objective   /88 (BP Location: Left arm, Patient Position: Sitting, Cuff Size: Large)   Pulse 82   Temp 97.7 °F (36.5 °C)   Ht 5' 6\" (1.676 m)   Wt 110 kg (242 lb)   LMP 10/14/2023 (Exact Date)   SpO2 99%   BMI 39.06 kg/m²     Physical Exam  Vitals and nursing note reviewed.   Constitutional:       General: She is not in acute distress.     Appearance: She is well-developed.   HENT:      Head: Normocephalic and atraumatic.   Eyes:      Conjunctiva/sclera: Conjunctivae normal.   Cardiovascular:      Rate and " Rhythm: Normal rate and regular rhythm.      Heart sounds: No murmur heard.  Pulmonary:      Effort: Pulmonary effort is normal. No respiratory distress.      Breath sounds: Normal breath sounds. No wheezing, rhonchi or rales.   Musculoskeletal:         General: No swelling.      Cervical back: Neck supple.   Skin:     General: Skin is warm and dry.      Capillary Refill: Capillary refill takes less than 2 seconds.   Neurological:      General: No focal deficit present.      Mental Status: She is alert and oriented to person, place, and time.      Cranial Nerves: No cranial nerve deficit.      Sensory: No sensory deficit.      Motor: No weakness.      Coordination: Coordination normal.   Psychiatric:         Mood and Affect: Mood normal.

## 2025-04-11 ENCOUNTER — APPOINTMENT (OUTPATIENT)
Dept: LAB | Facility: HOSPITAL | Age: 50
End: 2025-04-11
Attending: PHYSICIAN ASSISTANT
Payer: COMMERCIAL

## 2025-04-11 ENCOUNTER — RESULTS FOLLOW-UP (OUTPATIENT)
Dept: FAMILY MEDICINE CLINIC | Facility: CLINIC | Age: 50
End: 2025-04-11

## 2025-04-11 DIAGNOSIS — R73.03 PREDIABETES: ICD-10-CM

## 2025-04-11 DIAGNOSIS — R53.82 CHRONIC FATIGUE: ICD-10-CM

## 2025-04-11 DIAGNOSIS — R51.9 CHRONIC DAILY HEADACHE: ICD-10-CM

## 2025-04-11 DIAGNOSIS — E55.9 VITAMIN D DEFICIENCY: ICD-10-CM

## 2025-04-11 DIAGNOSIS — E53.8 B12 DEFICIENCY: Primary | ICD-10-CM

## 2025-04-11 LAB
25(OH)D3 SERPL-MCNC: 15.2 NG/ML (ref 30–100)
B BURGDOR IGG+IGM SER QL IA: NEGATIVE
EST. AVERAGE GLUCOSE BLD GHB EST-MCNC: 143 MG/DL
HBA1C MFR BLD: 6.6 %
TSH SERPL DL<=0.05 MIU/L-ACNC: 2.37 UIU/ML (ref 0.45–4.5)
VIT B12 SERPL-MCNC: 194 PG/ML (ref 180–914)

## 2025-04-11 PROCEDURE — 82306 VITAMIN D 25 HYDROXY: CPT

## 2025-04-11 PROCEDURE — 83036 HEMOGLOBIN GLYCOSYLATED A1C: CPT

## 2025-04-11 PROCEDURE — 84443 ASSAY THYROID STIM HORMONE: CPT

## 2025-04-11 PROCEDURE — 86618 LYME DISEASE ANTIBODY: CPT

## 2025-04-11 PROCEDURE — 82607 VITAMIN B-12: CPT

## 2025-04-11 PROCEDURE — 36415 COLL VENOUS BLD VENIPUNCTURE: CPT

## 2025-04-11 RX ORDER — LANOLIN ALCOHOL/MO/W.PET/CERES
1000 CREAM (GRAM) TOPICAL DAILY
Qty: 90 TABLET | Refills: 0 | Status: SHIPPED | OUTPATIENT
Start: 2025-04-11

## 2025-04-11 RX ORDER — ERGOCALCIFEROL 1.25 MG/1
1 CAPSULE ORAL WEEKLY
Qty: 12 CAPSULE | Refills: 0 | Status: SHIPPED | OUTPATIENT
Start: 2025-04-11

## 2025-04-11 NOTE — RESULT ENCOUNTER NOTE
Attempted to call patient, sounded like patient picked up, call was connected but nobody answered.    No

## 2025-04-14 ENCOUNTER — TELEPHONE (OUTPATIENT)
Age: 50
End: 2025-04-14

## 2025-04-14 ENCOUNTER — HOSPITAL ENCOUNTER (EMERGENCY)
Facility: HOSPITAL | Age: 50
Discharge: HOME | End: 2025-04-15
Attending: EMERGENCY MEDICINE
Payer: COMMERCIAL

## 2025-04-14 ENCOUNTER — APPOINTMENT (EMERGENCY)
Dept: CT IMAGING | Facility: HOSPITAL | Age: 50
End: 2025-04-14
Payer: COMMERCIAL

## 2025-04-14 DIAGNOSIS — L02.31 ABSCESS, GLUTEAL, LEFT: Primary | ICD-10-CM

## 2025-04-14 DIAGNOSIS — R73.03 PREDIABETES: ICD-10-CM

## 2025-04-14 LAB
ALBUMIN SERPL BCG-MCNC: 4 G/DL (ref 3.5–5)
ALP SERPL-CCNC: 83 U/L (ref 34–104)
ALT SERPL W P-5'-P-CCNC: 7 U/L (ref 7–52)
ANION GAP SERPL CALCULATED.3IONS-SCNC: 7 MMOL/L (ref 4–13)
AST SERPL W P-5'-P-CCNC: 11 U/L (ref 13–39)
BASOPHILS # BLD AUTO: 0.05 THOUSANDS/ÂΜL (ref 0–0.1)
BASOPHILS NFR BLD AUTO: 0 % (ref 0–1)
BILIRUB SERPL-MCNC: 0.39 MG/DL (ref 0.2–1)
BUN SERPL-MCNC: 9 MG/DL (ref 5–25)
CALCIUM SERPL-MCNC: 9.4 MG/DL (ref 8.4–10.2)
CHLORIDE SERPL-SCNC: 105 MMOL/L (ref 96–108)
CO2 SERPL-SCNC: 25 MMOL/L (ref 21–32)
CREAT SERPL-MCNC: 0.75 MG/DL (ref 0.6–1.3)
EOSINOPHIL # BLD AUTO: 0.24 THOUSAND/ÂΜL (ref 0–0.61)
EOSINOPHIL NFR BLD AUTO: 2 % (ref 0–6)
ERYTHROCYTE [DISTWIDTH] IN BLOOD BY AUTOMATED COUNT: 14.8 % (ref 11.6–15.1)
GFR SERPL CREATININE-BSD FRML MDRD: 94 ML/MIN/1.73SQ M
GLUCOSE SERPL-MCNC: 101 MG/DL (ref 65–140)
HCT VFR BLD AUTO: 39.5 % (ref 34.8–46.1)
HGB BLD-MCNC: 12.2 G/DL (ref 11.5–15.4)
IMM GRANULOCYTES # BLD AUTO: 0.04 THOUSAND/UL (ref 0–0.2)
IMM GRANULOCYTES NFR BLD AUTO: 0 % (ref 0–2)
LYMPHOCYTES # BLD AUTO: 3.02 THOUSANDS/ÂΜL (ref 0.6–4.47)
LYMPHOCYTES NFR BLD AUTO: 21 % (ref 14–44)
MCH RBC QN AUTO: 25.2 PG (ref 26.8–34.3)
MCHC RBC AUTO-ENTMCNC: 30.9 G/DL (ref 31.4–37.4)
MCV RBC AUTO: 81 FL (ref 82–98)
MONOCYTES # BLD AUTO: 0.94 THOUSAND/ÂΜL (ref 0.17–1.22)
MONOCYTES NFR BLD AUTO: 7 % (ref 4–12)
NEUTROPHILS # BLD AUTO: 9.97 THOUSANDS/ÂΜL (ref 1.85–7.62)
NEUTS SEG NFR BLD AUTO: 70 % (ref 43–75)
NRBC BLD AUTO-RTO: 0 /100 WBCS
PLATELET # BLD AUTO: 334 THOUSANDS/UL (ref 149–390)
PMV BLD AUTO: 10.3 FL (ref 8.9–12.7)
POTASSIUM SERPL-SCNC: 3.7 MMOL/L (ref 3.5–5.3)
PROT SERPL-MCNC: 7.2 G/DL (ref 6.4–8.4)
RBC # BLD AUTO: 4.85 MILLION/UL (ref 3.81–5.12)
SODIUM SERPL-SCNC: 137 MMOL/L (ref 135–147)
WBC # BLD AUTO: 14.26 THOUSAND/UL (ref 4.31–10.16)

## 2025-04-14 PROCEDURE — 96361 HYDRATE IV INFUSION ADD-ON: CPT

## 2025-04-14 PROCEDURE — 99284 EMERGENCY DEPT VISIT MOD MDM: CPT

## 2025-04-14 PROCEDURE — 80053 COMPREHEN METABOLIC PANEL: CPT | Performed by: EMERGENCY MEDICINE

## 2025-04-14 PROCEDURE — 72193 CT PELVIS W/DYE: CPT

## 2025-04-14 PROCEDURE — 96374 THER/PROPH/DIAG INJ IV PUSH: CPT

## 2025-04-14 PROCEDURE — 99285 EMERGENCY DEPT VISIT HI MDM: CPT | Performed by: EMERGENCY MEDICINE

## 2025-04-14 PROCEDURE — 85025 COMPLETE CBC W/AUTO DIFF WBC: CPT | Performed by: EMERGENCY MEDICINE

## 2025-04-14 PROCEDURE — 46050 I&D PERIANAL ABSCESS SUPFC: CPT | Performed by: EMERGENCY MEDICINE

## 2025-04-14 PROCEDURE — 36415 COLL VENOUS BLD VENIPUNCTURE: CPT | Performed by: EMERGENCY MEDICINE

## 2025-04-14 RX ORDER — KETOROLAC TROMETHAMINE 30 MG/ML
15 INJECTION, SOLUTION INTRAMUSCULAR; INTRAVENOUS ONCE
Status: COMPLETED | OUTPATIENT
Start: 2025-04-14 | End: 2025-04-14

## 2025-04-14 RX ADMIN — IOHEXOL 100 ML: 350 INJECTION, SOLUTION INTRAVENOUS at 22:26

## 2025-04-14 RX ADMIN — KETOROLAC TROMETHAMINE 15 MG: 30 INJECTION, SOLUTION INTRAMUSCULAR; INTRAVENOUS at 21:30

## 2025-04-14 RX ADMIN — SODIUM CHLORIDE 1000 ML: 0.9 INJECTION, SOLUTION INTRAVENOUS at 21:30

## 2025-04-14 NOTE — Clinical Note
Heidi Wheatley was seen and treated in our emergency department on 4/14/2025.                Diagnosis:     Heidi  may return to work on return date.    She may return on this date: 04/17/2025         If you have any questions or concerns, please don't hesitate to call.      Blair Deras MD    ______________________________           _______________          _______________  Hospital Representative                              Date                                Time

## 2025-04-14 NOTE — RESULT ENCOUNTER NOTE
4/14 spoke w/ pt then got disconnected. Tried to call several times after disconnect but call wouldn't go thru. Rb

## 2025-04-14 NOTE — TELEPHONE ENCOUNTER
Patient called to request a call back from Provider. She did not want to disclose information as to what the request was regarding. She just asked to speak to the Provider directly. Please advise.       Patient can be reached at [phone]

## 2025-04-15 ENCOUNTER — TELEPHONE (OUTPATIENT)
Age: 50
End: 2025-04-15

## 2025-04-15 VITALS
DIASTOLIC BLOOD PRESSURE: 58 MMHG | OXYGEN SATURATION: 98 % | WEIGHT: 241.4 LBS | TEMPERATURE: 98.1 F | HEART RATE: 93 BPM | HEIGHT: 66 IN | SYSTOLIC BLOOD PRESSURE: 122 MMHG | RESPIRATION RATE: 18 BRPM | BODY MASS INDEX: 38.8 KG/M2

## 2025-04-15 DIAGNOSIS — L02.31 ABSCESS OF GLUTEAL CLEFT: Primary | ICD-10-CM

## 2025-04-15 RX ORDER — LIDOCAINE HYDROCHLORIDE AND EPINEPHRINE 20; 5 MG/ML; UG/ML
10 INJECTION, SOLUTION EPIDURAL; INFILTRATION; INTRACAUDAL; PERINEURAL ONCE
Status: COMPLETED | OUTPATIENT
Start: 2025-04-15 | End: 2025-04-15

## 2025-04-15 RX ORDER — NAPROXEN 500 MG/1
500 TABLET ORAL 2 TIMES DAILY PRN
Qty: 20 TABLET | Refills: 0 | Status: SHIPPED | OUTPATIENT
Start: 2025-04-15

## 2025-04-15 RX ORDER — DOXYCYCLINE 100 MG/1
100 CAPSULE ORAL ONCE
Status: COMPLETED | OUTPATIENT
Start: 2025-04-15 | End: 2025-04-15

## 2025-04-15 RX ORDER — DOXYCYCLINE 100 MG/1
100 CAPSULE ORAL 2 TIMES DAILY
Qty: 14 CAPSULE | Refills: 0 | Status: SHIPPED | OUTPATIENT
Start: 2025-04-15 | End: 2025-04-22

## 2025-04-15 RX ADMIN — LIDOCAINE HYDROCHLORIDE,EPINEPHRINE BITARTRATE 10 ML: 20; .005 INJECTION, SOLUTION EPIDURAL; INFILTRATION; INTRACAUDAL; PERINEURAL at 00:24

## 2025-04-15 RX ADMIN — DOXYCYCLINE HYCLATE 100 MG: 100 CAPSULE ORAL at 00:23

## 2025-04-15 NOTE — ED PROVIDER NOTES
Time reflects when diagnosis was documented in both MDM as applicable and the Disposition within this note       Time User Action Codes Description Comment    4/15/2025 12:39 AM Blair Deras Add [L02.91] Abscess     4/15/2025 12:39 AM Blair Deras Add [L02.31] Abscess, gluteal, left     4/15/2025 12:39 AM Blair Deras Modify [L02.31] Abscess, gluteal, left     4/15/2025 12:39 AM Blair Deras Remove [L02.91] Abscess     4/15/2025  1:00 AM Blair Deras Add [R73.03] Prediabetes           ED Disposition       ED Disposition   Discharge    Condition   Stable    Date/Time   Tue Apr 15, 2025 12:39 AM    Comment   Heidi Wheatley discharge to home/self care.                   Assessment & Plan       Medical Decision Making  49-year-old female presents the emergency room with a chief complaint of left gluteal pain that started 3 days ago and has progressively worsened.    Patient states that she has had this previously which required drainage in an emergency room in [place] but not for a few years.    Patient states that she has had softer stools however she has recently started a new diet of sea morton and agave smoothies which she attributes the change in her bowel movements.    Patient denies any fever and is afebrile upon arrival to the emergency room.  Patient denies any nausea/vomiting.  Patient denies any abdominal pain.  Patient denies other symptoms or concerns.    Patient denies any history of immunocompromised state.  Denies any IV drug use.    Impression and plan: Gluteal pain with a broad differential.  On clinical examination there is a fluctuant area consistent with an abscess on the left gluteal region just lateral to the gluteal cleft.  On clinical examination is not clearly involvement of the rectum and appears superficial in nature.  I discussed symptomatic management versus further evaluation with CT imaging and after discussion with the patient, she prefers evaluation with imaging to determine the  extent and whether surgical intervention would be warranted versus treatment the emergency room.  Will obtain CT imaging of patient's pelvis to evaluate the extent of the abscess and whether there is involvement with a perirectal or perianal abscess that would warrant discussion with surgery.  Will treat patient symptomatically, monitor, and reassess.    Reassessment: Patient CT demonstrating gluteal abscess without involvement of the rectum.  Consented patient for incision and drainage which was completed myself with copious amount of purulent drainage, details under procedure note.  Will treat patient with course of doxycycline as there is some surrounding erythema.  Considering recurrent episodes will refer patient to surgery for further management.  Discussed and emphasized symptomatic management.  Discussed return precautions in detail.    Amount and/or Complexity of Data Reviewed  Labs: ordered.  Radiology: ordered.    Risk  Prescription drug management.        ED Course as of 04/15/25 2232   Tue Apr 15, 2025   0042 Abscess drained, discussed follow-up with surgery.  Discussed risks and benefits of doxycycline.  Discussed return precautions in detail.       Medications   ketorolac (TORADOL) injection 15 mg (15 mg Intravenous Given 4/14/25 2130)   sodium chloride 0.9 % bolus 1,000 mL (0 mL Intravenous Stopped 4/15/25 0017)   iohexol (OMNIPAQUE) 350 MG/ML injection (MULTI-DOSE) 100 mL (100 mL Intravenous Given 4/14/25 2226)   Lidocaine-EPINEPHrine (PF) (XYLOCAINE-MPF/EPINEPHRINE) 2 %-1:200,000 injection 10 mL (10 mL Infiltration Given by Other 4/15/25 0024)   doxycycline hyclate (VIBRAMYCIN) capsule 100 mg (100 mg Oral Given 4/15/25 0023)       ED Risk Strat Scores                    No data recorded                            History of Present Illness       Chief Complaint   Patient presents with    Abscess     Pt states a boil near rectum noticed it three days ago and has had increased pain since, looking to  get it lanced        Past Medical History:   Diagnosis Date    COVID-19 virus infection 3/19/2021    Headache(784.0)     Migraine     Nasal turbinate hypertrophy     OR correction today 2019    Tonsillitis, chronic     Wears glasses       Past Surgical History:   Procedure Laterality Date    APPENDECTOMY      BREAST BIOPSY Left 2020    benign     SECTION      COLONOSCOPY      MAMMO STEREOTACTIC BREAST BIOPSY LEFT (ALL INC) Left 2020    MYOMECTOMY N/A 10/17/2023    Procedure: MYOMECTOMY;  Surgeon: Rivka Sequeira MD;  [facility];  Service: Gynecology    MA HYSTEROSCOPY BX ENDOMETRIUM&/POLYPC W/WO D&C N/A 10/17/2023    Procedure: DILATATION AND CURETTAGE (D&C) WITH HYSTEROSCOPY;  Surgeon: Rivka Sequeira MD;  [facility];  Service: Gynecology    MA HYSTEROSCOPY ENDOMETRIAL ABLATION N/A 10/17/2023    Procedure: ABLATION ENDOMETRIAL NOVASURE;  Surgeon: Rivka Sequeira MD;  [facility];  Service: Gynecology    TONSILLECTOMY Bilateral 2019    Procedure: TONSILLECTOMY;  Surgeon: Karan Santamaria DO;  [facility];  Service: ENT    TUBAL LIGATION      TURBINATE RESECTION N/A 2019    Procedure: PARTIAL INFERIOR TURBINECTOMY WITH OUTFRACTURE;  Surgeon: Karan Santamaria DO;  [facility];  Service: ENT      Family History   Problem Relation Age of Onset    No Known Problems Mother     No Known Problems Father     No Known Problems Daughter     No Known Problems Daughter     Cancer Maternal Grandmother     No Known Problems Maternal Grandfather     No Known Problems Paternal Grandmother     No Known Problems Paternal Grandfather     No Known Problems Maternal Aunt     Breast cancer Maternal Aunt 45    No Known Problems Maternal Aunt     Cancer Family     BRCA2 Positive Neg Hx     BRCA2 Negative Neg Hx     BRCA1 Positive Neg Hx     BRCA1 Negative Neg Hx     Ovarian cancer Neg Hx     Colon cancer Neg Hx       Social History     Tobacco  Use    Smoking status: Never    Smokeless tobacco: Never   Vaping Use    Vaping status: Never Used   Substance Use Topics    Alcohol use: Yes     Alcohol/week: 1.0 - 2.0 standard drink of alcohol     Types: 1 - 2 Glasses of wine per week     Comment: Social     Drug use: No      E-Cigarette/Vaping    E-Cigarette Use Never User       E-Cigarette/Vaping Substances    Nicotine No     THC No     CBD No     Flavoring No     Other No     Unknown No       I have reviewed and agree with the history as documented.     HPI    Review of Systems        Objective       ED Triage Vitals   Temperature Pulse Blood Pressure Respirations SpO2 Patient Position - Orthostatic VS   04/14/25 2042 04/14/25 2042 04/14/25 2042 04/14/25 2042 04/14/25 2042 04/14/25 2042   98.1 °F (36.7 °C) (!) 122 (!) 179/87 20 99 % Standing      Temp Source Heart Rate Source BP Location FiO2 (%) Pain Score    04/14/25 2042 04/14/25 2042 04/14/25 2042 -- 04/14/25 2130    Temporal Monitor Left arm  10 - Worst Possible Pain      Vitals      Date and Time Temp Pulse SpO2 Resp BP Pain Score FACES Pain Rating User   04/14/25 2330 -- 93 98 % 18 122/58 -- -- KG   04/14/25 2320 -- 92 100 % 18 129/67 -- -- KG   04/14/25 2130 -- -- -- -- -- 10 - Worst Possible Pain -- TE   04/14/25 2042 98.1 °F (36.7 °C) 122 99 % 20 179/87 -- -- LA            Physical Exam  Exam conducted with a chaperone present (Josephine).   Constitutional:       Appearance: Normal appearance.   Cardiovascular:      Rate and Rhythm: Regular rhythm. Tachycardia present.   Pulmonary:      Effort: Pulmonary effort is normal.   Abdominal:      Palpations: Abdomen is soft.      Tenderness: There is no abdominal tenderness. There is no guarding or rebound.   Genitourinary:     Comments: There is fluctuance and tenderness along the left gluteal cleft that does not appear to extend to the rectum.  Clinically this is consistent with an abscess.  Skin:     General: Skin is warm and dry.   Neurological:       Mental Status: She is alert.         Results Reviewed       Procedure Component Value Units Date/Time    Comprehensive metabolic panel [[id number]]  (Abnormal) Collected: 04/14/25 2128    Lab Status: Final result Specimen: Blood from Arm, Right Updated: 04/14/25 2152     Sodium 137 mmol/L      Potassium 3.7 mmol/L      Chloride 105 mmol/L      CO2 25 mmol/L      ANION GAP 7 mmol/L      BUN 9 mg/dL      Creatinine 0.75 mg/dL      Glucose 101 mg/dL      Calcium 9.4 mg/dL      AST 11 U/L      ALT 7 U/L      Alkaline Phosphatase 83 U/L      Total Protein 7.2 g/dL      Albumin 4.0 g/dL      Total Bilirubin 0.39 mg/dL      eGFR 94 ml/min/1.73sq m     Narrative:      National Kidney Disease Foundation guidelines for Chronic Kidney Disease (CKD):     Stage 1 with normal or high GFR (GFR > 90 mL/min/1.73 square meters)    Stage 2 Mild CKD (GFR = 60-89 mL/min/1.73 square meters)    Stage 3A Moderate CKD (GFR = 45-59 mL/min/1.73 square meters)    Stage 3B Moderate CKD (GFR = 30-44 mL/min/1.73 square meters)    Stage 4 Severe CKD (GFR = 15-29 mL/min/1.73 square meters)    Stage 5 End Stage CKD (GFR <15 mL/min/1.73 square meters)  Note: GFR calculation is accurate only with a steady state creatinine    CBC and differential [[id number]]  (Abnormal) Collected: 04/14/25 2128    Lab Status: Final result Specimen: Blood from Arm, Right Updated: 04/14/25 2135     WBC 14.26 Thousand/uL      RBC 4.85 Million/uL      Hemoglobin 12.2 g/dL      Hematocrit 39.5 %      MCV 81 fL      MCH 25.2 pg      MCHC 30.9 g/dL      RDW 14.8 %      MPV 10.3 fL      Platelets 334 Thousands/uL      nRBC 0 /100 WBCs      Segmented % 70 %      Immature Grans % 0 %      Lymphocytes % 21 %      Monocytes % 7 %      Eosinophils Relative 2 %      Basophils Relative 0 %      Absolute Neutrophils 9.97 Thousands/µL      Absolute Immature Grans 0.04 Thousand/uL      Absolute Lymphocytes 3.02 Thousands/µL      Absolute Monocytes 0.94 Thousand/µL      Eosinophils  "Absolute 0.24 Thousand/µL      Basophils Absolute 0.05 Thousands/µL             CT pelvis w contrast   Final Interpretation by Maverick Carter DO (04/14 7085)   Findings most consistent with left gluteal fold abscess measuring up to 2.7 cm.   I discussed the study by via [employer] secure chat with Dr. BLAIR JENSEN on 4/14/2025 11:39 PM.               Workstation performed: [id number]             Incision and drain    Date/Time: 4/15/2025 12:39 AM    Performed by: Blair Jensen MD  Authorized by: Blair Jensen MD  Universal Protocol:  Consent: Verbal consent obtained.  Risks and benefits: risks, benefits and alternatives were discussed  Consent given by: patient  Time out: Immediately prior to procedure a \"time out\" was called to verify the correct patient, procedure, equipment, support staff and site/side marked as required.  Timeout called at: 4/15/2025 12:40 AM.  Patient understanding: patient states understanding of the procedure being performed  Patient consent: the patient's understanding of the procedure matches consent given  Procedure consent: procedure consent matches procedure scheduled  Site marked: the operative site was marked  Radiology Images displayed and confirmed. If images not available, report reviewed: imaging studies available  Required items: required blood products, implants, devices, and special equipment available  Patient identity confirmed: verbally with patient    Patient location:  ED  Location:     Type:  Abscess    Size:  2 cm    Location:  Anogenital    Anogenital location:  Gluteal cleft  Pre-procedure details:     Skin preparation:  Antiseptic wash  Procedure details:     Incision types:  Single straight    Scalpel blade:  11    Wound management:  Probed and deloculated    Drainage:  Purulent    Drainage amount:  Copious    Packing materials:  None  Post-procedure details:     Patient tolerance of procedure:  Tolerated well, no immediate complications      ED Medication and " Procedure Management   Prior to Admission Medications   Prescriptions Last Dose Informant Patient Reported? Taking?   Vitamin D, Ergocalciferol, 33771 units CAPS   No No   Sig: Take 1 capsule by mouth once a week   linaCLOtide (Linzess) 72 MCG CAPS   No No   Sig: Take 72 mcg by mouth in the morning   nystatin (MYCOSTATIN) ointment  Self No No   Sig: Apply topically 2 (two) times a day for 28 days   Patient taking differently: Apply topically if needed prn   triamcinolone (KENALOG) 0.5 % ointment   No No   Sig: Apply topically 2 (two) times a day   vitamin B-12 (VITAMIN B-12) 1,000 mcg tablet   No No   Sig: Take 1 tablet (1,000 mcg total) by mouth daily      Facility-Administered Medications: None     Discharge Medication List as of 4/15/2025 12:49 AM        START taking these medications    Details   doxycycline hyclate (VIBRAMYCIN) 100 mg capsule Take 1 capsule (100 mg total) by mouth 2 (two) times a day for 7 days, Starting Tue 4/15/2025, Until Tue 4/22/2025, Print           CONTINUE these medications which have NOT CHANGED    Details   linaCLOtide (Linzess) 72 MCG CAPS Take 72 mcg by mouth in the morning, Starting Thu 10/10/2024, Normal      nystatin (MYCOSTATIN) ointment Apply topically 2 (two) times a day for 28 days, Starting Tue 8/30/2022, Until Thu 10/10/2024, Normal      triamcinolone (KENALOG) 0.5 % ointment Apply topically 2 (two) times a day, Starting Tue 12/24/2024, Normal      vitamin B-12 (VITAMIN B-12) 1,000 mcg tablet Take 1 tablet (1,000 mcg total) by mouth daily, Starting Fri 4/11/2025, Normal      Vitamin D, Ergocalciferol, 93847 units CAPS Take 1 capsule by mouth once a week, Starting Fri 4/11/2025, Normal           No discharge procedures on file.  ED SEPSIS DOCUMENTATION   Time reflects when diagnosis was documented in both MDM as applicable and the Disposition within this note       Time User Action Codes Description Comment    4/15/2025 12:39 AM Blair Deras Add [L02.91] Abscess      4/15/2025 12:39 AM Blair Deras [L02.31] Abscess, gluteal, left     4/15/2025 12:39 AM Blair Deras Modify [L02.31] Abscess, gluteal, left     4/15/2025 12:39 AM Blair Deras Remove [L02.91] Abscess     4/15/2025  1:00 AM Blair Deras [R73.03] Prediabetes                  Blair Deras MD  04/15/25 6035

## 2025-04-15 NOTE — TELEPHONE ENCOUNTER
Pt wants to make an appt as she has a referral for abscess so I transferred her to Blair in triage

## 2025-04-15 NOTE — TELEPHONE ENCOUNTER
I called patient she has a gluteal abscess went to the ER and had lesioned drained advised she needs to follow up with general surgery. Surgeon referral placed.

## 2025-04-29 ENCOUNTER — CONSULT (OUTPATIENT)
Age: 50
End: 2025-04-29
Payer: COMMERCIAL

## 2025-04-29 VITALS
HEIGHT: 66 IN | BODY MASS INDEX: 38.73 KG/M2 | OXYGEN SATURATION: 98 % | HEART RATE: 85 BPM | DIASTOLIC BLOOD PRESSURE: 82 MMHG | SYSTOLIC BLOOD PRESSURE: 121 MMHG | WEIGHT: 241 LBS

## 2025-04-29 DIAGNOSIS — K62.89 ANAL PAIN: ICD-10-CM

## 2025-04-29 DIAGNOSIS — K62.5 RECTAL BLEEDING: ICD-10-CM

## 2025-04-29 DIAGNOSIS — K60.2 ANAL FISSURE: Primary | ICD-10-CM

## 2025-04-29 DIAGNOSIS — K64.1 PROLAPSED INTERNAL HEMORRHOIDS, GRADE 2: ICD-10-CM

## 2025-04-29 DIAGNOSIS — K58.1 IRRITABLE BOWEL SYNDROME WITH CONSTIPATION: ICD-10-CM

## 2025-04-29 PROCEDURE — 46600 DIAGNOSTIC ANOSCOPY SPX: CPT | Performed by: COLON & RECTAL SURGERY

## 2025-04-29 PROCEDURE — 99242 OFF/OP CONSLTJ NEW/EST SF 20: CPT | Performed by: COLON & RECTAL SURGERY

## 2025-04-30 ENCOUNTER — NURSE TRIAGE (OUTPATIENT)
Age: 50
End: 2025-04-30

## 2025-04-30 NOTE — TELEPHONE ENCOUNTER
Regarding: Arm pain  ----- Message from Norma SANON sent at 4/30/2025  1:04 PM EDT -----  Pt sent the following Appk[employer]xt message:    I almost feel a little over a week ago and hurt my arm trying to keep myself from falling. It is still hurting till this moment. Sometimes I can't lift my arm. Is there a scan or something you can order so I can get done before my appointment with you on 5/16?

## 2025-04-30 NOTE — TELEPHONE ENCOUNTER
"FOLLOW UP: pt already has an appt  PCP call back    REASON FOR CONVERSATION: Arm Injury    SYMPTOMS: arm pain    OTHER: Triage nurse called pt to further assess pts injury. Pt states she had snow in her area 2 weeks ago, and slipped and fell. Pt went to stop her self from falling and didn't land on her arm but swung out her arm fast. Pt states her arm was numb at the time but isn't now. Pt states the pain is with movement or if she grabs an item and pain rate a 7 at time of movement.     Pt denies any other issues or swelling at this time. Pt has weakness but this is when she grabs and item and then lets go due to the pain.    Per protocol, Triage nurse attempted to make pt an appt sooner. Pt refused she states \" she has an appt on the 16th and just wanted to know if PCP wanted to order scans before that appt, so they can evalualte on the 16th.    PCP please call pt back to further advise.     DISPOSITION: See Within 3 Days in Office, See Today or Tomorrow in Office    Reason for Disposition   Patient wants to be seen   Injury and pain has not improved after 3 days    Answer Assessment - Initial Assessment Questions  1. MECHANISM: \"How did the injury happen?\"      Possibly pulled muscle by catching herself from falling.  2. ONSET: \"When did the injury happen?\" (e.g., minutes, hours ago)       2 weeks ago  3. LOCATION: \"Where is the injury located?\" \"Which arm?\"      Left arm, and shoulder region hurts  4. APPEARANCE of INJURY: \"What does the injury look like?\"       Looks normal , possibly was swollen in the beg  5. SEVERITY: \"Can you use the arm normally?\"       Can't use arm normally fully  6. SWELLING or BRUISING: \"is there any swelling or bruising?\" If Yes, ask: \"How large is it? (e.g., inches, centimeters)       Denies at this time  7. PAIN: \"Is there pain?\" If Yes, ask: \"How bad is the pain?\" (Scale 0-10; or none, mild, moderate, severe)      7 when it hurts trying to do something but not at this time  8. " "TETANUS: For any breaks in the skin, ask: \"When was the last tetanus booster?\"      unknown  9. OTHER SYMPTOMS: \"Do you have any other symptoms?\"  (e.g., numbness in hand)      Hand numbness when it first happened and now it has resolved  Arm is weak to grab something due to the pain she tries to stop   10. PREGNANCY: \"Is there any chance you are pregnant?\" \"When was your last menstrual period?\"        Denies, last cycle 1yr ago due to ablation    Protocols used: Arm Injury-Adult-[place]    "

## 2025-05-01 ENCOUNTER — CONSULT (OUTPATIENT)
Dept: SURGERY | Facility: CLINIC | Age: 50
End: 2025-05-01
Attending: FAMILY MEDICINE
Payer: COMMERCIAL

## 2025-05-01 VITALS
OXYGEN SATURATION: 98 % | RESPIRATION RATE: 18 BRPM | HEIGHT: 66 IN | DIASTOLIC BLOOD PRESSURE: 72 MMHG | WEIGHT: 238.2 LBS | TEMPERATURE: 97.8 F | SYSTOLIC BLOOD PRESSURE: 126 MMHG | BODY MASS INDEX: 38.28 KG/M2 | HEART RATE: 83 BPM

## 2025-05-01 DIAGNOSIS — L02.31 ABSCESS OF GLUTEAL CLEFT: ICD-10-CM

## 2025-05-01 PROCEDURE — 99244 OFF/OP CNSLTJ NEW/EST MOD 40: CPT | Performed by: STUDENT IN AN ORGANIZED HEALTH CARE EDUCATION/TRAINING PROGRAM

## 2025-05-01 RX ORDER — CHLORHEXIDINE GLUCONATE 40 MG/ML
SOLUTION TOPICAL DAILY PRN
OUTPATIENT
Start: 2025-05-01

## 2025-05-01 RX ORDER — HEPARIN SODIUM 5000 [USP'U]/ML
5000 INJECTION, SOLUTION INTRAVENOUS; SUBCUTANEOUS ONCE
OUTPATIENT
Start: 2025-05-01 | End: 2025-05-01

## 2025-05-01 NOTE — PROGRESS NOTES
Name: Heidi Wheatley      : 1975      MRN: [id number]  Encounter Provider: Peter Caro DO  Encounter Date: 2025   Encounter department: [facility]  :  Assessment & Plan  Abscess of gluteal cleft  49-year-old female with left buttock abscess  -See HPI  -On the left medial buttock there is an area of scar tissue and induration without signs of infection  -CBC and CMP from 2025 reviewed  - CT scan of the pelvis from 2025 report and images reviewed, this was also reviewed with the patient  -Colorectal surgery note from 2025 reviewed  --Lower endoscopy with no sign of anal fistula  -Emergency department note from 2025 reviewed  - Discussed with patient that I do not feel a fistulous tract to the area so I do believe this is an isolated cyst/recurrent area of abscess  - Plan for excision of left buttock mass under general anesthesia    All risks, benefits, alternatives of the procedure were discussed at length with the patient.  Risks include bleeding, infection, damage to surrounding structures, recurrence.  All questions were answered to satisfaction.  The patient voiced understanding and signed consent.    Orders:    Ambulatory Referral to General Surgery    Case request operating room: EXCISION  BIOPSY LESION/ MASS Buttock; Standing        History of Present Illness   HPI  Heidi Wheatley is a 49 y.o. female who presents for evaluation of a buttock abscess.  Patient was recently seen in the emergency department for a buttock abscess which was drained.  She states that she had this drained twice about 10 years ago and it had not caused her any issues up until recently.  She states that is healed well and she does not have any continued drainage.  History obtained from: patient    Review of Systems   Constitutional:  Negative for chills, fatigue and fever.   HENT:  Negative for congestion, hearing loss, rhinorrhea and sore throat.    Eyes:  Negative  for pain and discharge.   Respiratory:  Negative for cough, chest tightness and shortness of breath.    Cardiovascular:  Negative for chest pain and palpitations.   Gastrointestinal:  Negative for abdominal pain, constipation, diarrhea, nausea and vomiting.   Endocrine: Negative for cold intolerance and heat intolerance.   Genitourinary:  Negative for difficulty urinating and dysuria.   Musculoskeletal:  Negative for back pain and neck pain.   Skin:  Negative for color change and rash.        Positive buttock abscess   Allergic/Immunologic: Negative for environmental allergies and food allergies.   Neurological:  Negative for seizures and headaches.   Hematological:  Does not bruise/bleed easily.   Psychiatric/Behavioral:  Negative for confusion and hallucinations.      Medical History Reviewed by provider this encounter:  Tobacco  Allergies  Meds  Problems  Med Hx  Surg Hx  Fam Hx     .  Past Medical History   Past Medical History:   Diagnosis Date    COVID-19 virus infection 3/19/2021    Headache(784.0)     Migraine     Nasal turbinate hypertrophy     OR correction today 2019    Tonsillitis, chronic     Wears glasses      Past Surgical History:   Procedure Laterality Date    APPENDECTOMY      BREAST BIOPSY Left 2020    benign     SECTION      COLONOSCOPY      MAMMO STEREOTACTIC BREAST BIOPSY LEFT (ALL INC) Left 2020    MYOMECTOMY N/A 10/17/2023    Procedure: MYOMECTOMY;  Surgeon: Rivka Sequeira MD;  [facility]: [facility];  Service: Gynecology    SC HYSTEROSCOPY BX ENDOMETRIUM&/POLYPC W/WO D&C N/A 10/17/2023    Procedure: DILATATION AND CURETTAGE (D&C) WITH HYSTEROSCOPY;  Surgeon: Rivka Sequeira MD;  [facility]: [facility];  Service: Gynecology    SC HYSTEROSCOPY ENDOMETRIAL ABLATION N/A 10/17/2023    Procedure: ABLATION ENDOMETRIAL NOVASURE;  Surgeon: Rivka Sequeira MD;  [facility];  Service: Gynecology    TONSILLECTOMY Bilateral 2019    Procedure:  TONSILLECTOMY;  Surgeon: Karan Santamaria DO;  [facility];  Service: ENT    TUBAL LIGATION      TURBINATE RESECTION N/A 11/19/2019    Procedure: PARTIAL INFERIOR TURBINECTOMY WITH OUTFRACTURE;  Surgeon: Karan Santamaria DO;  [facility];  Service: ENT     Family History   Problem Relation Age of Onset    No Known Problems Mother     No Known Problems Father     No Known Problems Daughter     No Known Problems Daughter     Cancer Maternal Grandmother     No Known Problems Maternal Grandfather     No Known Problems Paternal Grandmother     No Known Problems Paternal Grandfather     No Known Problems Maternal Aunt     Breast cancer Maternal Aunt 45    No Known Problems Maternal Aunt     Cancer Family     BRCA2 Positive Neg Hx     BRCA2 Negative Neg Hx     BRCA1 Positive Neg Hx     BRCA1 Negative Neg Hx     Ovarian cancer Neg Hx     Colon cancer Neg Hx       reports that she has never smoked. She has never been exposed to tobacco smoke. She has never used smokeless tobacco. She reports current alcohol use of about 1.0 - 2.0 standard drink of alcohol per week. She reports that she does not use drugs.  Current Outpatient Medications   Medication Instructions    Linzess 72 mcg, Oral, Daily    naproxen (NAPROSYN) 500 mg, Oral, 2 times daily PRN    nystatin (MYCOSTATIN) ointment Topical, 2 times daily    triamcinolone (KENALOG) 0.5 % ointment Topical, 2 times daily    vitamin B-12 (VITAMIN B-12) 1,000 mcg, Oral, Daily    Vitamin D, Ergocalciferol, 79070 units CAPS 1 capsule, Oral, Weekly   No Known Allergies   Current Outpatient Medications on File Prior to Visit   Medication Sig Dispense Refill    linaCLOtide (Linzess) 72 MCG CAPS Take 72 mcg by mouth in the morning 90 capsule 2    naproxen (NAPROSYN) 500 mg tablet Take 1 tablet (500 mg total) by mouth 2 (two) times a day as needed for moderate pain for up to 20 doses 20 tablet 0    triamcinolone (KENALOG) 0.5 % ointment Apply topically 2 (two) times a day 15  "g 2    Vitamin D, Ergocalciferol, 28280 units CAPS Take 1 capsule by mouth once a week 12 capsule 0    nystatin (MYCOSTATIN) ointment Apply topically 2 (two) times a day for 28 days (Patient taking differently: Apply topically if needed prn) 30 g 1    vitamin B-12 (VITAMIN B-12) 1,000 mcg tablet Take 1 tablet (1,000 mcg total) by mouth daily (Patient not taking: Reported on 5/1/2025) 90 tablet 0     No current facility-administered medications on file prior to visit.      Social History     Tobacco Use    Smoking status: Never     Passive exposure: Never    Smokeless tobacco: Never   Vaping Use    Vaping status: Never Used   Substance and Sexual Activity    Alcohol use: Yes     Alcohol/week: 1.0 - 2.0 standard drink of alcohol     Types: 1 - 2 Glasses of wine per week     Comment: Social     Drug use: No    Sexual activity: Yes     Partners: Male     Birth control/protection: Female Sterilization        Objective   /72 (BP Location: Left arm, Patient Position: Sitting, Cuff Size: Large)   Pulse 83   Temp 97.8 °F (36.6 °C)   Resp 18   Ht 5' 6\" (1.676 m)   Wt 108 kg (238 lb 3.2 oz)   LMP 10/14/2023 (Exact Date)   SpO2 98%   BMI 38.45 kg/m²      Physical Exam  Exam conducted with a chaperone present (JONATHAN Rm).   Constitutional:       Appearance: Normal appearance.   HENT:      Head: Normocephalic and atraumatic.      Nose: Nose normal.   Eyes:      General: No scleral icterus.     Conjunctiva/sclera: Conjunctivae normal.   Cardiovascular:      Rate and Rhythm: Normal rate and regular rhythm.      Heart sounds: Normal heart sounds.   Pulmonary:      Effort: Pulmonary effort is normal.      Breath sounds: Normal breath sounds.   Abdominal:      Comments: left medial buttock there is an area of scar tissue and induration without signs of infection   Musculoskeletal:         General: No signs of injury.   Skin:     General: Skin is warm.      Coloration: Skin is not jaundiced.   Neurological:      " General: No focal deficit present.      Mental Status: She is alert and oriented to person, place, and time.   Psychiatric:         Mood and Affect: Mood normal.         Behavior: Behavior normal.

## 2025-05-01 NOTE — ASSESSMENT & PLAN NOTE
49-year-old female with left buttock abscess  -See HPI  -On the left medial buttock there is an area of scar tissue and induration without signs of infection  -CBC and CMP from 4/14/2025 reviewed  - CT scan of the pelvis from 4/14/2025 report and images reviewed, this was also reviewed with the patient  -Colorectal surgery note from 4/29/2025 reviewed  --Lower endoscopy with no sign of anal fistula  -Emergency department note from 4/14/2025 reviewed  - Discussed with patient that I do not feel a fistulous tract to the area so I do believe this is an isolated cyst/recurrent area of abscess  - Plan for excision of left buttock mass under general anesthesia    All risks, benefits, alternatives of the procedure were discussed at length with the patient.  Risks include bleeding, infection, damage to surrounding structures, recurrence.  All questions were answered to satisfaction.  The patient voiced understanding and signed consent.    Orders:    Ambulatory Referral to General Surgery    Case request operating room: EXCISION  BIOPSY LESION/ MASS Buttock; Standing

## 2025-05-01 NOTE — H&P (VIEW-ONLY)
Name: Heidi Wheatley      : 1975      MRN: [id number]  Encounter Provider: Peter Caro DO  Encounter Date: 2025   Encounter department: [facility]  :  Assessment & Plan  Abscess of gluteal cleft  49-year-old female with left buttock abscess  -See HPI  -On the left medial buttock there is an area of scar tissue and induration without signs of infection  -CBC and CMP from 2025 reviewed  - CT scan of the pelvis from 2025 report and images reviewed, this was also reviewed with the patient  -Colorectal surgery note from 2025 reviewed  --Lower endoscopy with no sign of anal fistula  -Emergency department note from 2025 reviewed  - Discussed with patient that I do not feel a fistulous tract to the area so I do believe this is an isolated cyst/recurrent area of abscess  - Plan for excision of left buttock mass under general anesthesia    All risks, benefits, alternatives of the procedure were discussed at length with the patient.  Risks include bleeding, infection, damage to surrounding structures, recurrence.  All questions were answered to satisfaction.  The patient voiced understanding and signed consent.    Orders:    Ambulatory Referral to General Surgery    Case request operating room: EXCISION  BIOPSY LESION/ MASS Buttock; Standing        History of Present Illness   HPI  Heidi Wheatley is a 49 y.o. female who presents for evaluation of a buttock abscess.  Patient was recently seen in the emergency department for a buttock abscess which was drained.  She states that she had this drained twice about 10 years ago and it had not caused her any issues up until recently.  She states that is healed well and she does not have any continued drainage.  History obtained from: patient    Review of Systems   Constitutional:  Negative for chills, fatigue and fever.   HENT:  Negative for congestion, hearing loss, rhinorrhea and sore throat.    Eyes:  Negative  for pain and discharge.   Respiratory:  Negative for cough, chest tightness and shortness of breath.    Cardiovascular:  Negative for chest pain and palpitations.   Gastrointestinal:  Negative for abdominal pain, constipation, diarrhea, nausea and vomiting.   Endocrine: Negative for cold intolerance and heat intolerance.   Genitourinary:  Negative for difficulty urinating and dysuria.   Musculoskeletal:  Negative for back pain and neck pain.   Skin:  Negative for color change and rash.        Positive buttock abscess   Allergic/Immunologic: Negative for environmental allergies and food allergies.   Neurological:  Negative for seizures and headaches.   Hematological:  Does not bruise/bleed easily.   Psychiatric/Behavioral:  Negative for confusion and hallucinations.      Medical History Reviewed by provider this encounter:  Tobacco  Allergies  Meds  Problems  Med Hx  Surg Hx  Fam Hx     .  Past Medical History   Past Medical History:   Diagnosis Date    COVID-19 virus infection 3/19/2021    Headache(784.0)     Migraine     Nasal turbinate hypertrophy     OR correction today 2019    Tonsillitis, chronic     Wears glasses      Past Surgical History:   Procedure Laterality Date    APPENDECTOMY      BREAST BIOPSY Left 2020    benign     SECTION      COLONOSCOPY      MAMMO STEREOTACTIC BREAST BIOPSY LEFT (ALL INC) Left 2020    MYOMECTOMY N/A 10/17/2023    Procedure: MYOMECTOMY;  Surgeon: Rivka Sequeira MD;  [facility];  Service: Gynecology    CO HYSTEROSCOPY BX ENDOMETRIUM&/POLYPC W/WO D&C N/A 10/17/2023    Procedure: DILATATION AND CURETTAGE (D&C) WITH HYSTEROSCOPY;  Surgeon: Rivka Sequeira MD;  [facility];  Service: Gynecology    CO HYSTEROSCOPY ENDOMETRIAL ABLATION N/A 10/17/2023    Procedure: ABLATION ENDOMETRIAL NOVASURE;  Surgeon: Rivka Sequeira MD;  [facility];  Service: Gynecology    TONSILLECTOMY Bilateral 2019    Procedure:  TONSILLECTOMY;  Surgeon: Karan Santamaria DO;  [facility];  Service: ENT    TUBAL LIGATION      TURBINATE RESECTION N/A 11/19/2019    Procedure: PARTIAL INFERIOR TURBINECTOMY WITH OUTFRACTURE;  Surgeon: Karan Santamaria DO;  [facility];  Service: ENT     Family History   Problem Relation Age of Onset    No Known Problems Mother     No Known Problems Father     No Known Problems Daughter     No Known Problems Daughter     Cancer Maternal Grandmother     No Known Problems Maternal Grandfather     No Known Problems Paternal Grandmother     No Known Problems Paternal Grandfather     No Known Problems Maternal Aunt     Breast cancer Maternal Aunt 45    No Known Problems Maternal Aunt     Cancer Family     BRCA2 Positive Neg Hx     BRCA2 Negative Neg Hx     BRCA1 Positive Neg Hx     BRCA1 Negative Neg Hx     Ovarian cancer Neg Hx     Colon cancer Neg Hx       reports that she has never smoked. She has never been exposed to tobacco smoke. She has never used smokeless tobacco. She reports current alcohol use of about 1.0 - 2.0 standard drink of alcohol per week. She reports that she does not use drugs.  Current Outpatient Medications   Medication Instructions    Linzess 72 mcg, Oral, Daily    naproxen (NAPROSYN) 500 mg, Oral, 2 times daily PRN    nystatin (MYCOSTATIN) ointment Topical, 2 times daily    triamcinolone (KENALOG) 0.5 % ointment Topical, 2 times daily    vitamin B-12 (VITAMIN B-12) 1,000 mcg, Oral, Daily    Vitamin D, Ergocalciferol, 63168 units CAPS 1 capsule, Oral, Weekly   No Known Allergies   Current Outpatient Medications on File Prior to Visit   Medication Sig Dispense Refill    linaCLOtide (Linzess) 72 MCG CAPS Take 72 mcg by mouth in the morning 90 capsule 2    naproxen (NAPROSYN) 500 mg tablet Take 1 tablet (500 mg total) by mouth 2 (two) times a day as needed for moderate pain for up to 20 doses 20 tablet 0    triamcinolone (KENALOG) 0.5 % ointment Apply topically 2 (two) times a day 15  "g 2    Vitamin D, Ergocalciferol, 94094 units CAPS Take 1 capsule by mouth once a week 12 capsule 0    nystatin (MYCOSTATIN) ointment Apply topically 2 (two) times a day for 28 days (Patient taking differently: Apply topically if needed prn) 30 g 1    vitamin B-12 (VITAMIN B-12) 1,000 mcg tablet Take 1 tablet (1,000 mcg total) by mouth daily (Patient not taking: Reported on 5/1/2025) 90 tablet 0     No current facility-administered medications on file prior to visit.      Social History     Tobacco Use    Smoking status: Never     Passive exposure: Never    Smokeless tobacco: Never   Vaping Use    Vaping status: Never Used   Substance and Sexual Activity    Alcohol use: Yes     Alcohol/week: 1.0 - 2.0 standard drink of alcohol     Types: 1 - 2 Glasses of wine per week     Comment: Social     Drug use: No    Sexual activity: Yes     Partners: Male     Birth control/protection: Female Sterilization        Objective   /72 (BP Location: Left arm, Patient Position: Sitting, Cuff Size: Large)   Pulse 83   Temp 97.8 °F (36.6 °C)   Resp 18   Ht 5' 6\" (1.676 m)   Wt 108 kg (238 lb 3.2 oz)   LMP 10/14/2023 (Exact Date)   SpO2 98%   BMI 38.45 kg/m²      Physical Exam  Exam conducted with a chaperone present (JONATHAN Rm).   Constitutional:       Appearance: Normal appearance.   HENT:      Head: Normocephalic and atraumatic.      Nose: Nose normal.   Eyes:      General: No scleral icterus.     Conjunctiva/sclera: Conjunctivae normal.   Cardiovascular:      Rate and Rhythm: Normal rate and regular rhythm.      Heart sounds: Normal heart sounds.   Pulmonary:      Effort: Pulmonary effort is normal.      Breath sounds: Normal breath sounds.   Abdominal:      Comments: left medial buttock there is an area of scar tissue and induration without signs of infection   Musculoskeletal:         General: No signs of injury.   Skin:     General: Skin is warm.      Coloration: Skin is not jaundiced.   Neurological:      " General: No focal deficit present.      Mental Status: She is alert and oriented to person, place, and time.   Psychiatric:         Mood and Affect: Mood normal.         Behavior: Behavior normal.

## 2025-05-05 ENCOUNTER — EVALUATION (OUTPATIENT)
Dept: PHYSICAL THERAPY | Facility: CLINIC | Age: 50
End: 2025-05-05
Payer: COMMERCIAL

## 2025-05-05 DIAGNOSIS — N39.41 URGE INCONTINENCE: ICD-10-CM

## 2025-05-05 PROCEDURE — 97112 NEUROMUSCULAR REEDUCATION: CPT

## 2025-05-05 PROCEDURE — 97530 THERAPEUTIC ACTIVITIES: CPT

## 2025-05-05 PROCEDURE — 97161 PT EVAL LOW COMPLEX 20 MIN: CPT

## 2025-05-05 NOTE — PROGRESS NOTES
PT Evaluation     Today's date: 2025  Patient name: Heidi Wheatley  : 1975  MRN: [id number]  Referring provider: Karen Madrigal PA-C  Dx:   Encounter Diagnosis     ICD-10-CM    1. Urge incontinence  N39.41 Ambulatory Referral to Physical Therapy          Start Time: 1715  Stop Time: 1805  Total time in clinic (min): 50 minutes    Assessment  Other impairment: UUI  Symptom irritability: high    Assessment details: Heidi Wheatley is a 49 y.o. female who presents with concerns of urinary incontinence and high urgency. Symptoms include: constant urge to urinate and leakage with urgency when walking to the bathroom or trying to hold against urges. Pt also reports being constantly dehydrated due to not wanting to drink in order to avoid urination/leakage. Examination reveals overactivity in the pelvic floor and weakness contributing to her OAB.       The plan of care was discussed and included education regarding pelvic floor anatomy, explanation of exam technique, explanation of exam findings and discussion of treatment plan as well as the importance of patient compliance and adherence to physical therapy visits. Patient would benefit from skilled physical therapy services  to address deficits and ultimately meet goal of independent self management of condition.     Patient provided written and verbal consent for pelvic floor muscle exam: yes          Goals  STGs to be met in 4 weeks:  * Patient will be compliant with introductory HEP as prescribed.  * Presents with improved understanding of bladder irritants in diet and makes concerted effort to reduce them by at least 50-75%.     LTGs to be met by discharge:   * Patient reports that she is able to successfully suppress an urge to empty her bladder for 20' without leakage.   * Presents with improved fluid intake to avoid concentrated and irritating urine by discharge.  * Presents with improved nocturia to 0-2 toiletings/night for more thorough sleep.  "  * Patient implements urge suppression strategies throughout the day and reports that her average voiding interval is 2+ hours upon discharge.   * Patient will be compliant with comprehensive home exercise program for self management of condition.        Plan  Patient would benefit from: skilled physical therapy    Frequency: Every other week  Duration in weeks: 10  Plan of Care beginning date: 5/5/2025  Plan of Care expiration date: 7/14/2025  Treatment plan discussed with: patient        PT Pelvic Floor Subjective:   History of Present Illness:   Got an ablation Sept 2023 and started having urinary symptoms since.   Reports leakage with urgency when walking to the bathroom.   Constantly feels the urge to urinate but will only go every 2 hours.   Denies stress urinary incontinence.   Denies numbness and tingling.     Works at the [employer] and on [employer] for the [employer].   Bladder Function:     Voiding Difficulties positive for: urgency and frequent urination       Voiding Difficulties comments:     Voiding frequency: every 1-2 hours and every 31-60 minutes    Urinary leakage: urine leakage    Urinary leakage aggravated by: walking to the bathroom and seeing a toilet    Nocturia (episodes per night): 4 and 4 or more    Intake (ounces):     Intake (ounces) comment: Consistently dehydrated because she wants to avoid going to the bathroom.   Incontinence Management:     Pads/Diaper Use:  Day  Bowel Function:     Voiding DIfficulties: constipation      Bowel Function comments:  Worse when she limits her water intake.     Bowel frequency: every 2 days    [place] Stool Scale: type 2 and type 3    Stool softeners: linzess.  Sexual Function:     Sexually Active:  Sexually active    Pain during intercourse: No    Pain:     No pain reported by patient.  Diagnostic Tests:     None    Treatments:     None    Patient Goals:     Patient goals for therapy:  Improved bladder or bowel function    Other patient goals:  \" I " "am hoping to get better control of it\"      Objective       Abdominal Assessment:           Pelvic Floor Muscle Exam:             PERFECT Score   Power right: 3+/5   Power left: 3+/5   Endurance (seconds to max): 5    Fast flicks (in 10 seconds): 3   Perfect Score: Tone: OVERACTIVE   TTP: B OI, R>L - mimicks occasional pain felt with intercourse.                      Precautions:   Patient Active Problem List   Diagnosis    Prediabetes    Irritable bowel syndrome with constipation    Hypertrophy of both inferior nasal turbinates    Plantar fasciitis of left foot    S/P tonsillectomy    Acute midline low back pain without sciatica    Impaired fasting glucose    Hemorrhoids, external    Abscess of axilla, right    Acute pain of left knee    Menorrhagia with irregular cycle    Fibroids, submucosal    Chills (without fever)    Generalized body aches    Chest congestion    Nasal congestion    Hematochezia    Abscess of gluteal cleft       PRO EVAL RE-EVAL DISCHARGE   PFDI      TRAMAINE-18 NV     VPQ      CPSI-NIH      PGQ          POC Expires Auth Status Start Date Exp Date PT Visit Limit Unit limit DA provider   7/14 5/5  BOMN -          Date of Service 5/5           Visits Used            Visits Remaining                        Manuals                                                            Neuro Re-Ed            Education  Urge supression           Deep core activation X10            Elevators                                                             Ther Ex            Hip IR                                                                        Ther Activity            Education Anatomy and POC                                                                        Modalities                                                 "

## 2025-05-19 NOTE — PRE-PROCEDURE INSTRUCTIONS
Pre-Surgery Instructions:   Medication Instructions    Lidocaine 4 % OINT Uses PRN- DO NOT take day of surgery    linaCLOtide (Linzess) 72 MCG CAPS Hold day of surgery.    nystatin (MYCOSTATIN) ointment Uses PRN- DO NOT take day of surgery    triamcinolone (KENALOG) 0.5 % ointment Uses PRN- DO NOT take day of surgery    Vitamin D, Ergocalciferol, 33313 units CAPS Stop taking 7 days prior to surgery.    Medication instructions for day of surgery reviewed. Please take all instructed medications with only a sip of water.       You will receive a call one business day prior to surgery with an arrival time and hospital directions. If your surgery is scheduled on a Monday, the hospital will be calling you on the Friday prior to your surgery. If you have not heard from anyone by 8pm, please call the hospital supervisor through the hospital  at [phone]. ([place] [phone] or [place] [phone]).    Do not eat or drink anything after midnight the night before your surgery, including candy, mints, lifesavers, or chewing gum. Do not drink alcohol 24hrs before your surgery. Try not to smoke at least 24hrs before your surgery.       Follow the pre surgery showering instructions as listed in the “My Surgical Experience Booklet” or otherwise provided by your surgeon's office. Do not use a blade to shave the surgical area 1 week before surgery. It is okay to use a clean electric clippers up to 24 hours before surgery. Do not apply any lotions, creams, including makeup, cologne, deodorant, or perfumes after showering on the day of your surgery. Do not use dry shampoo, hair spray, hair gel, or any type of hair products.     No contact lenses, eye make-up, or artificial eyelashes. Remove nail polish, including gel polish, and any artificial, gel, or acrylic nails if possible. Remove all jewelry including rings and body piercing jewelry.     Wear causal clothing that is easy to take on and off. Consider your type of  surgery.    Keep any valuables, jewelry, piercings at home. Please bring any specially ordered equipment (sling, braces) if indicated.    Arrange for a responsible person to drive you to and from the hospital on the day of your surgery. Please confirm the visitor policy for the day of your procedure when you receive your phone call with an arrival time.     Call the surgeon's office with any new illnesses, exposures, or additional questions prior to surgery.    Please reference your “My Surgical Experience Booklet” for additional information to prepare for your upcoming surgery.

## 2025-05-28 ENCOUNTER — ANESTHESIA EVENT (OUTPATIENT)
Dept: PERIOP | Facility: HOSPITAL | Age: 50
End: 2025-05-28
Payer: COMMERCIAL

## 2025-05-28 ENCOUNTER — ANESTHESIA (OUTPATIENT)
Dept: PERIOP | Facility: HOSPITAL | Age: 50
End: 2025-05-28
Payer: COMMERCIAL

## 2025-05-28 ENCOUNTER — HOSPITAL ENCOUNTER (OUTPATIENT)
Facility: HOSPITAL | Age: 50
Setting detail: OUTPATIENT SURGERY
Discharge: HOME | End: 2025-05-28
Attending: STUDENT IN AN ORGANIZED HEALTH CARE EDUCATION/TRAINING PROGRAM | Admitting: STUDENT IN AN ORGANIZED HEALTH CARE EDUCATION/TRAINING PROGRAM
Payer: COMMERCIAL

## 2025-05-28 VITALS
DIASTOLIC BLOOD PRESSURE: 60 MMHG | SYSTOLIC BLOOD PRESSURE: 119 MMHG | RESPIRATION RATE: 17 BRPM | WEIGHT: 235.45 LBS | HEIGHT: 67 IN | BODY MASS INDEX: 36.96 KG/M2 | HEART RATE: 81 BPM | TEMPERATURE: 97.6 F | OXYGEN SATURATION: 100 %

## 2025-05-28 DIAGNOSIS — L02.31 ABSCESS OF GLUTEAL CLEFT: Primary | ICD-10-CM

## 2025-05-28 DIAGNOSIS — K58.1 IRRITABLE BOWEL SYNDROME WITH CONSTIPATION: ICD-10-CM

## 2025-05-28 PROCEDURE — 12032 INTMD RPR S/A/T/EXT 2.6-7.5: CPT | Performed by: STUDENT IN AN ORGANIZED HEALTH CARE EDUCATION/TRAINING PROGRAM

## 2025-05-28 PROCEDURE — 11406 EXC TR-EXT B9+MARG >4.0 CM: CPT | Performed by: STUDENT IN AN ORGANIZED HEALTH CARE EDUCATION/TRAINING PROGRAM

## 2025-05-28 PROCEDURE — 88305 TISSUE EXAM BY PATHOLOGIST: CPT | Performed by: PATHOLOGY

## 2025-05-28 RX ORDER — ROCURONIUM BROMIDE 10 MG/ML
INJECTION, SOLUTION INTRAVENOUS AS NEEDED
Status: DISCONTINUED | OUTPATIENT
Start: 2025-05-28 | End: 2025-05-28

## 2025-05-28 RX ORDER — SODIUM CHLORIDE, SODIUM LACTATE, POTASSIUM CHLORIDE, CALCIUM CHLORIDE 600; 310; 30; 20 MG/100ML; MG/100ML; MG/100ML; MG/100ML
INJECTION, SOLUTION INTRAVENOUS CONTINUOUS PRN
Status: DISCONTINUED | OUTPATIENT
Start: 2025-05-28 | End: 2025-05-28

## 2025-05-28 RX ORDER — FENTANYL CITRATE/PF 50 MCG/ML
50 SYRINGE (ML) INJECTION
Status: DISCONTINUED | OUTPATIENT
Start: 2025-05-28 | End: 2025-05-28 | Stop reason: HOSPADM

## 2025-05-28 RX ORDER — ACETAMINOPHEN 10 MG/ML
1000 INJECTION, SOLUTION INTRAVENOUS ONCE AS NEEDED
Status: DISCONTINUED | OUTPATIENT
Start: 2025-05-28 | End: 2025-05-28 | Stop reason: HOSPADM

## 2025-05-28 RX ORDER — HEPARIN SODIUM 5000 [USP'U]/ML
5000 INJECTION, SOLUTION INTRAVENOUS; SUBCUTANEOUS ONCE
Status: COMPLETED | OUTPATIENT
Start: 2025-05-28 | End: 2025-05-28

## 2025-05-28 RX ORDER — CEFAZOLIN SODIUM 2 G/50ML
2000 SOLUTION INTRAVENOUS ONCE
Status: COMPLETED | OUTPATIENT
Start: 2025-05-28 | End: 2025-05-28

## 2025-05-28 RX ORDER — DOCUSATE SODIUM 100 MG/1
100 CAPSULE, LIQUID FILLED ORAL 3 TIMES DAILY PRN
Qty: 30 CAPSULE | Refills: 0 | Status: SHIPPED | OUTPATIENT
Start: 2025-05-28

## 2025-05-28 RX ORDER — TRAMADOL HYDROCHLORIDE 50 MG/1
50 TABLET ORAL EVERY 6 HOURS PRN
Qty: 16 TABLET | Refills: 0 | Status: SHIPPED | OUTPATIENT
Start: 2025-05-28 | End: 2025-06-04

## 2025-05-28 RX ORDER — LIDOCAINE HYDROCHLORIDE AND EPINEPHRINE 10; 10 MG/ML; UG/ML
INJECTION, SOLUTION INFILTRATION; PERINEURAL AS NEEDED
Status: DISCONTINUED | OUTPATIENT
Start: 2025-05-28 | End: 2025-05-28 | Stop reason: HOSPADM

## 2025-05-28 RX ORDER — KETOROLAC TROMETHAMINE 30 MG/ML
15 INJECTION, SOLUTION INTRAMUSCULAR; INTRAVENOUS ONCE AS NEEDED
Status: DISCONTINUED | OUTPATIENT
Start: 2025-05-28 | End: 2025-05-28 | Stop reason: HOSPADM

## 2025-05-28 RX ORDER — DEXAMETHASONE SODIUM PHOSPHATE 10 MG/ML
INJECTION, SOLUTION INTRAMUSCULAR; INTRAVENOUS AS NEEDED
Status: DISCONTINUED | OUTPATIENT
Start: 2025-05-28 | End: 2025-05-28

## 2025-05-28 RX ORDER — TRAMADOL HYDROCHLORIDE 50 MG/1
50 TABLET ORAL EVERY 6 HOURS PRN
Status: DISCONTINUED | OUTPATIENT
Start: 2025-05-28 | End: 2025-05-28 | Stop reason: HOSPADM

## 2025-05-28 RX ORDER — MIDAZOLAM HYDROCHLORIDE 2 MG/2ML
INJECTION, SOLUTION INTRAMUSCULAR; INTRAVENOUS AS NEEDED
Status: DISCONTINUED | OUTPATIENT
Start: 2025-05-28 | End: 2025-05-28

## 2025-05-28 RX ORDER — HYDROMORPHONE HCL/PF 1 MG/ML
0.5 SYRINGE (ML) INJECTION
Status: DISCONTINUED | OUTPATIENT
Start: 2025-05-28 | End: 2025-05-28 | Stop reason: HOSPADM

## 2025-05-28 RX ORDER — FENTANYL CITRATE 50 UG/ML
INJECTION, SOLUTION INTRAMUSCULAR; INTRAVENOUS AS NEEDED
Status: DISCONTINUED | OUTPATIENT
Start: 2025-05-28 | End: 2025-05-28

## 2025-05-28 RX ORDER — ACETAMINOPHEN 325 MG/1
650 TABLET ORAL EVERY 6 HOURS PRN
Status: DISCONTINUED | OUTPATIENT
Start: 2025-05-28 | End: 2025-05-28 | Stop reason: HOSPADM

## 2025-05-28 RX ORDER — CHLORHEXIDINE GLUCONATE 40 MG/ML
SOLUTION TOPICAL DAILY PRN
Status: DISCONTINUED | OUTPATIENT
Start: 2025-05-28 | End: 2025-05-28 | Stop reason: HOSPADM

## 2025-05-28 RX ORDER — MAGNESIUM HYDROXIDE 1200 MG/15ML
LIQUID ORAL AS NEEDED
Status: DISCONTINUED | OUTPATIENT
Start: 2025-05-28 | End: 2025-05-28 | Stop reason: HOSPADM

## 2025-05-28 RX ORDER — LIDOCAINE HCL/PF 100 MG/5ML
SYRINGE (ML) INJECTION AS NEEDED
Status: DISCONTINUED | OUTPATIENT
Start: 2025-05-28 | End: 2025-05-28

## 2025-05-28 RX ORDER — PROPOFOL 10 MG/ML
INJECTION, EMULSION INTRAVENOUS AS NEEDED
Status: DISCONTINUED | OUTPATIENT
Start: 2025-05-28 | End: 2025-05-28

## 2025-05-28 RX ADMIN — PROPOFOL 200 MG: 10 INJECTION, EMULSION INTRAVENOUS at 07:50

## 2025-05-28 RX ADMIN — MIDAZOLAM HYDROCHLORIDE 2 MG: 1 INJECTION, SOLUTION INTRAMUSCULAR; INTRAVENOUS at 07:44

## 2025-05-28 RX ADMIN — FENTANYL CITRATE 100 MCG: 50 INJECTION, SOLUTION INTRAMUSCULAR; INTRAVENOUS at 07:50

## 2025-05-28 RX ADMIN — SUGAMMADEX 200 MG: 100 INJECTION, SOLUTION INTRAVENOUS at 08:29

## 2025-05-28 RX ADMIN — DEXAMETHASONE SODIUM PHOSPHATE 10 MG: 10 INJECTION INTRAMUSCULAR; INTRAVENOUS at 07:50

## 2025-05-28 RX ADMIN — CEFAZOLIN SODIUM 2000 MG: 2 SOLUTION INTRAVENOUS at 07:56

## 2025-05-28 RX ADMIN — HEPARIN SODIUM 5000 UNITS: 5000 INJECTION INTRAVENOUS; SUBCUTANEOUS at 07:26

## 2025-05-28 RX ADMIN — ROCURONIUM 40 MG: 50 INJECTION, SOLUTION INTRAVENOUS at 07:50

## 2025-05-28 RX ADMIN — LIDOCAINE HYDROCHLORIDE 100 MG: 20 INJECTION, SOLUTION INTRAVENOUS at 07:50

## 2025-05-28 RX ADMIN — SODIUM CHLORIDE, SODIUM LACTATE, POTASSIUM CHLORIDE, AND CALCIUM CHLORIDE: .6; .31; .03; .02 INJECTION, SOLUTION INTRAVENOUS at 07:44

## 2025-05-28 NOTE — OP NOTE
OPERATIVE REPORT  PATIENT NAME: Heidi Wheatley    :  1975  MRN: [id number]  Pt Location: [facility] OR ROOM 03    SURGERY DATE: 2025    Surgeons and Role:     * Peter Caro DO - Primary    Preop Diagnosis:  Abscess of gluteal cleft [L02.31]    Post-Op Diagnosis Codes:     * Abscess of gluteal cleft [L02.31]    Procedure(s):  Left - EXCISION  BIOPSY LESION/ MASS Buttock    Specimen(s):  ID Type Source Tests Collected by Time Destination   1 : LEFT BUTTOCK MASS Tissue Soft Tissue, Other TISSUE EXAM Peterlluvia Puckett DO [place] 2025 0811        Estimated Blood Loss:   Minimal    Drains:  * No LDAs found *    Anesthesia Type:   General/LMA    Operative Indications:  Abscess of gluteal cleft [L02.31]    Operative Findings:  Specimen size 4.75 x 2.5 x 2.5 cm, incision closure 6 cm  Specimen was excised with grossly negative margins, no measurable margins were taken  Dissection was carried down to the subcutaneous tissue and did not go any deeper     Complications:   None    Procedure and Technique:  The patient was seen again in Preoperative Holding.  All the risks, benefits, complications, treatment options, and expected outcomes were discussed with the patient and family at length. All questions were answered to satisfaction. There was concurrence with the proposed plan and informed consent was obtained. The site of surgery was properly noted/marked. The patient was taken to Operating Room, identified, and the procedure verified as excision of mass left buttock.    Anesthesia was then induced and the patient was intubated.  The patient was placed in the prone position on the operating room table.  The patient had received preoperative antibiotics and SCDs placed on the bilateral lower extremities.     The buttocks was then prepped and draped in the usual sterile fashion using Betadine.  A Time-Out was then performed with all involved present confirming the correct patient, procedure, antibiotics, and  any additional concerns.     Using 1% lidocaine with epinephrine the area around the mass was anesthetized.  Using a marking pen and a ruler an elliptical area was marked around the mass and scar tissue.  Using #15 blade an elliptical incision was incised and dissection was carried down to the subcutaneous tissue.  There was no obvious fistula tract noted.  The specimen was then excised. Specimen size 4.75 x 2.5 x 2.5 cm. Hemostasis was ensured with electrocautery.  The cavity was then irrigated.  Interrupted 3-0 Vicryl was used approximate the deep dermal tissue.  Running 4-0 Monocryl was used to close the skin.    The incision was then cleansed and dried and Steri-Strips, 2 x 2, Tegaderm were used to cover the sites.    All instrument, sponge, and needle counts were noted to be correct at the conclusion of the case.     I was present for the entire procedure. and A qualified resident physician was not available.    Patient Disposition:  PACU  and extubated and stable    SIGNATURE: Peter Caro,   DATE: May 28, 2025  TIME: 8:32 AM

## 2025-05-28 NOTE — DISCHARGE INSTR - AVS FIRST PAGE
Your incisions are covered with Steri-Strips, 4 x 4 gauze, Tegaderm.  In 2 days you may remove your dressing, the Steri-Strips will remain on your skin but the 4 x 4 gauze and Tegaderm can be removed.  The Steri-Strips will fall off on their own.  After your dressings are removed you may shower.  Do not soak your incisions including tub baths or swimming.  You may shower and let water and soap wash over incisions. Do not scrub your incisions.  You may resume your normal diet as tolerated.  Do not make any important decisions and do not drive while taking narcotic pain medication.  You are prescribed Tramadol for severe pain. Take only as needed.  Take Colace to soften your stool while taking narcotic pain medication.  You may take Tylenol over-the-counter as needed for pain, follow instructions on the bottle.  You may take Ibuprofen over-the-counter as needed for pain, follow instructions on the bottle.  You may alternate Tylenol and Ibuprofen if needed, but do not take at the same time.  Follow-up with your Surgeon in 2 weeks, call the office for an appointment.  You will receive a survey via Email in regards to your same day surgery experience. Please fill out the survey to let us know how we did with your care.

## 2025-05-28 NOTE — INTERVAL H&P NOTE
H&P reviewed. After examining the patient I find no changes in the patients condition since the H&P had been written.    Vitals:    05/28/25 0715   BP: 151/83   Pulse: 89   Resp: 18   Temp: (!) 97.1 °F (36.2 °C)   SpO2: 99%

## 2025-05-28 NOTE — ANESTHESIA POSTPROCEDURE EVALUATION
Post-Op Assessment Note    CV Status:  Stable  Pain Score: 0    Pain management: satisfactory to patient    Multimodal analgesia used between 6 hours prior to anesthesia start to PACU discharge    Mental Status:  Alert and awake   Hydration Status:  Euvolemic   PONV Controlled:  Controlled   Airway Patency:  Patent     Post Op Vitals Reviewed: Yes    No anethesia notable event occurred.    Staff: CRNA           Last Filed PACU Vitals:  Vitals Value Taken Time   Temp 97.3 °F (36.3 °C) 05/28/25 09:00   Pulse 71 05/28/25 09:10   /59 05/28/25 09:00   Resp 8 05/28/25 09:10   SpO2 99 % 05/28/25 09:10   Vitals shown include unfiled device data.    Modified Estrellita:     Vitals Value Taken Time   Activity 2 05/28/25 09:00   Respiration 2 05/28/25 09:00   Circulation 2 05/28/25 09:00   Consciousness 2 05/28/25 09:00   Oxygen Saturation 2 05/28/25 09:00     Modified Estrellita Score: 10

## 2025-05-28 NOTE — ANESTHESIA PREPROCEDURE EVALUATION
Procedure:  EXCISION  BIOPSY LESION/ MASS Buttock (Left: Buttocks)    Relevant Problems   ANESTHESIA (within normal limits)      CARDIO   (+) Hemorrhoids, external   (-) CALDERA (dyspnea on exertion)      MUSCULOSKELETAL   (+) Acute midline low back pain without sciatica      NEURO/PSYCH   (-) Seizures (HCC)      PULMONARY   (-) Asthma   (-) Sleep apnea        Physical Exam    Airway     Mallampati score: II  TM Distance: >3 FB  Neck ROM: full      Cardiovascular  Cardiovascular exam normal    Dental   No notable dental hx     Pulmonary  Pulmonary exam normal     Neurological    She appears awake, alert and oriented x3.      Other Findings  post-pubertal.      Anesthesia Plan  ASA Score- 2     Anesthesia Type- general with ASA Monitors.         Additional Monitors:     Airway Plan: Oral ETT.           Plan Factors-Exercise tolerance (METS): >4 METS.    Chart reviewed. EKG reviewed. Imaging results reviewed. Existing labs reviewed. Patient summary reviewed.    Patient is not a current smoker.      Obstructive sleep apnea risk education given perioperatively.        Induction- intravenous.    Postoperative Plan- Plan for postoperative opioid use.   Monitoring Plan - Monitoring plan - standard ASA monitoring and train of four monitoring  Post Operative Pain Plan - plan for postoperative opioid use and multimodal analgesia        Informed Consent- Anesthetic plan and risks discussed with patient.  I personally reviewed this patient with the CRNA. Discussed and agreed on the Anesthesia Plan with the CRNA..      NPO Status:  Vitals Value Taken Time   Date of last liquid 05/27/25 05/28/25 07:16   Time of last liquid 2000 05/28/25 07:16   Date of last solid 05/27/25 05/28/25 07:16   Time of last solid 2200 05/28/25 07:16

## 2025-06-02 PROCEDURE — 88305 TISSUE EXAM BY PATHOLOGIST: CPT | Performed by: PATHOLOGY

## 2025-06-04 ENCOUNTER — OFFICE VISIT (OUTPATIENT)
Dept: SURGERY | Facility: CLINIC | Age: 50
End: 2025-06-04

## 2025-06-04 VITALS
HEART RATE: 100 BPM | SYSTOLIC BLOOD PRESSURE: 126 MMHG | HEIGHT: 67 IN | TEMPERATURE: 97.9 F | DIASTOLIC BLOOD PRESSURE: 72 MMHG | RESPIRATION RATE: 18 BRPM | BODY MASS INDEX: 36.22 KG/M2 | WEIGHT: 230.8 LBS | OXYGEN SATURATION: 98 %

## 2025-06-04 DIAGNOSIS — L02.31 ABSCESS OF GLUTEAL CLEFT: Primary | ICD-10-CM

## 2025-06-04 PROCEDURE — 99024 POSTOP FOLLOW-UP VISIT: CPT | Performed by: STUDENT IN AN ORGANIZED HEALTH CARE EDUCATION/TRAINING PROGRAM

## 2025-06-04 NOTE — LETTER
June 4, 2025     Patient: Heidi Wheatley  YOB: 1975  Date of Visit: 6/4/2025      To Whom it May Concern:    Heidi Wheatley is under my professional care. Heidi was seen in my office on 6/4/2025. Heidi may return to work on 6/11/2025 with no restrictions.    If you have any questions or concerns, please don't hesitate to call.         Sincerely,          Peter Caro, DO        CC: No Recipients

## 2025-06-04 NOTE — PROGRESS NOTES
Name: Heidi Wheatley      : 1975      MRN: [id number]  Encounter Provider: Peter Caro DO  Encounter Date: 2025   Encounter department: [facility]  :  Assessment & Plan  Abscess of gluteal cleft  49-year-old female status post excision of left buttock scar/mass on 2025  -Patient notes some discomfort over the incision site especially when sitting or putting pressure on the area  - Does note she has had a little drainage from the middle of the wound but this has been slowly decreasing  -Left buttock incision healing well without erythema induration or drainage  - Pathology report reviewed  -Patient is okay to return to work with no restrictions on 2025  -Follow-up in office as needed    Final Diagnosis   A. Skin, left buttock:  SCAR     I reviewed the pathology report and discussed the findings with the patient and their accompanying family or caregiver, if present. All questions were answered to satisfaction and the patient along with family or caregiver, if present, voiced understanding.             History of Present Illness   HPI  Heidi Wheatley is a 49 y.o. female who presents for evaluation status post excision of left buttock mass.  She notes some discomfort in the area especially with palpation or pressure.  Notes some drainage that was a little bloody and then clear yellow but this has decreased.  History obtained from: patient    Review of Systems   Constitutional:  Negative for chills, fatigue and fever.   HENT:  Negative for congestion, hearing loss, rhinorrhea and sore throat.    Eyes:  Negative for pain and discharge.   Respiratory:  Negative for cough, chest tightness and shortness of breath.    Cardiovascular:  Negative for chest pain and palpitations.   Gastrointestinal:  Negative for abdominal pain, constipation, diarrhea, nausea and vomiting.   Endocrine: Negative for cold intolerance and heat intolerance.   Genitourinary:  Negative for  "difficulty urinating and dysuria.   Musculoskeletal:  Negative for back pain and neck pain.   Skin:  Negative for color change and rash.   Allergic/Immunologic: Negative for environmental allergies and food allergies.   Neurological:  Negative for seizures and headaches.   Hematological:  Does not bruise/bleed easily.   Psychiatric/Behavioral:  Negative for confusion and hallucinations.      Medical History Reviewed by provider this encounter:  Tobacco  Allergies  Meds  Problems  Med Hx  Surg Hx  Fam Hx     .  Past Medical History   Past Medical History[1]  Past Surgical History[2]  Family History[3]   reports that she has never smoked. She has never been exposed to tobacco smoke. She has never used smokeless tobacco. She reports current alcohol use of about 1.0 - 2.0 standard drink of alcohol per week. She reports that she does not use drugs.  Current Outpatient Medications   Medication Instructions    docusate sodium (COLACE) 100 mg, Oral, 3 times daily PRN    Lidocaine 4 % OINT Diltiazem-Lidocaine ointment as directed-rectal    Linzess 72 mcg, Oral, Daily    nystatin (MYCOSTATIN) ointment Topical, 2 times daily    triamcinolone (KENALOG) 0.5 % ointment Topical, 2 times daily    vitamin B-12 (VITAMIN B-12) 1,000 mcg, Oral, Daily    Vitamin D, Ergocalciferol, 39128 units CAPS 1 capsule, Oral, Weekly   Allergies[4]   Medications Ordered Prior to Encounter[5]   Social History[6]     Objective   /72 (BP Location: Left arm, Patient Position: Standing, Cuff Size: Large)   Pulse 100   Temp 97.9 °F (36.6 °C)   Resp 18   Ht 5' 7\" (1.702 m)   Wt 105 kg (230 lb 12.8 oz)   LMP 10/14/2023 (Exact Date)   SpO2 98%   BMI 36.15 kg/m²      Physical Exam  Constitutional:       Appearance: Normal appearance.   HENT:      Head: Normocephalic and atraumatic.      Nose: Nose normal.     Eyes:      General: No scleral icterus.     Conjunctiva/sclera: Conjunctivae normal.       Cardiovascular:      Rate and Rhythm: " Tachycardia present.   Pulmonary:      Effort: Pulmonary effort is normal.     Musculoskeletal:         General: No signs of injury.     Skin:     General: Skin is warm.      Coloration: Skin is not jaundiced.      Comments: Left buttock incision healing well without erythema induration or drainage     Neurological:      General: No focal deficit present.      Mental Status: She is alert and oriented to person, place, and time.     Psychiatric:         Mood and Affect: Mood normal.         Behavior: Behavior normal.                [1]   Past Medical History:  Diagnosis Date    Arthritis     hands    COVID-19 virus infection 2021    Exercise involving walking     at work    Headache(784.0)     Irritable bowel syndrome     Migraine     Nasal turbinate hypertrophy     OR correction today 2019    Prediabetes     Teeth missing     Wears glasses    [2]   Past Surgical History:  Procedure Laterality Date    APPENDECTOMY      BREAST BIOPSY Left 2020    benign-marker implanted     SECTION      COLONOSCOPY      MAMMO STEREOTACTIC BREAST BIOPSY LEFT (ALL INC) Left 2020    MYOMECTOMY N/A 10/17/2023    Procedure: MYOMECTOMY;  Surgeon: Rivka Sequeira MD;  Location: [facility];  Service: Gynecology    FL EXC TUMOR SOFT TISSUE PELVIS & HIP SUBQ <3CM Left 2025    Procedure: EXCISION  BIOPSY LESION/ MASS Buttock;  Surgeon: Peter Caro DO;  Location: [facility];  Service: General    FL HYSTEROSCOPY BX ENDOMETRIUM&/POLYPC W/WO D&C N/A 10/17/2023    Procedure: DILATATION AND CURETTAGE (D&C) WITH HYSTEROSCOPY;  Surgeon: Rivka Sequeira MD;  Location: [facility];  Service: Gynecology    FL HYSTEROSCOPY ENDOMETRIAL ABLATION N/A 10/17/2023    Procedure: ABLATION ENDOMETRIAL NOVASURE;  Surgeon: Rivka Sequeira MD;  Location: [facility];  Service: Gynecology    TONSILLECTOMY Bilateral 2019    Procedure: TONSILLECTOMY;  Surgeon: Karan Santamaria DO;  Location: [facility];   Service: ENT    TUBAL LIGATION      TURBINATE RESECTION N/A 11/19/2019    Procedure: PARTIAL INFERIOR TURBINECTOMY WITH OUTFRACTURE;  Surgeon: Karan Santamaria DO;  Location: [facility];  Service: ENT   [3]   Family History  Problem Relation Name Age of Onset    No Known Problems Mother      No Known Problems Father      No Known Problems Daughter      No Known Problems Daughter      Cancer Maternal Grandmother Betsey herrera     No Known Problems Maternal Grandfather      No Known Problems Paternal Grandmother      No Known Problems Paternal Grandfather      No Known Problems Maternal Aunt      Breast cancer Maternal Aunt Radha Herrera 45    No Known Problems Maternal Aunt      Cancer Family      BRCA2 Positive Neg Hx      BRCA2 Negative Neg Hx      BRCA1 Positive Neg Hx      BRCA1 Negative Neg Hx      Ovarian cancer Neg Hx      Colon cancer Neg Hx     [4] No Known Allergies  [5]   Current Outpatient Medications on File Prior to Visit   Medication Sig Dispense Refill    docusate sodium (COLACE) 100 mg capsule Take 1 capsule (100 mg total) by mouth 3 (three) times a day as needed for constipation (while taking narcotic pain medication) 30 capsule 0    Lidocaine 4 % OINT Diltiazem-Lidocaine ointment as directed-rectal      linaCLOtide (Linzess) 72 MCG CAPS Take 72 mcg by mouth in the morning 90 capsule 2    nystatin (MYCOSTATIN) ointment Apply topically 2 (two) times a day for 28 days 30 g 1    triamcinolone (KENALOG) 0.5 % ointment Apply topically 2 (two) times a day 15 g 2    vitamin B-12 (VITAMIN B-12) 1,000 mcg tablet Take 1 tablet (1,000 mcg total) by mouth daily 90 tablet 0    Vitamin D, Ergocalciferol, 04296 units CAPS Take 1 capsule by mouth once a week 12 capsule 0    [DISCONTINUED] naproxen (NAPROSYN) 500 mg tablet Take 1 tablet (500 mg total) by mouth 2 (two) times a day as needed for moderate pain for up to 20 doses 20 tablet 0    [DISCONTINUED] traMADol (ULTRAM) 50 mg tablet Take 1 tablet (50 mg total) by  mouth every 6 (six) hours as needed for severe pain for up to 10 days (Patient not taking: Reported on 6/4/2025) 16 tablet 0     No current facility-administered medications on file prior to visit.   [6]   Social History  Tobacco Use    Smoking status: Never     Passive exposure: Never    Smokeless tobacco: Never   Vaping Use    Vaping status: Never Used   Substance and Sexual Activity    Alcohol use: Yes     Alcohol/week: 1.0 - 2.0 standard drink of alcohol     Types: 1 - 2 Glasses of wine per week     Comment: Social     Drug use: No

## 2025-06-04 NOTE — ASSESSMENT & PLAN NOTE
49-year-old female status post excision of left buttock scar/mass on 5/28/2025  -Patient notes some discomfort over the incision site especially when sitting or putting pressure on the area  - Does note she has had a little drainage from the middle of the wound but this has been slowly decreasing  -Left buttock incision healing well without erythema induration or drainage  - Pathology report reviewed  -Patient is okay to return to work with no restrictions on 6/11/2025  -Follow-up in office as needed    Final Diagnosis   A. Skin, left buttock:  SCAR     I reviewed the pathology report and discussed the findings with the patient and their accompanying family or caregiver, if present. All questions were answered to satisfaction and the patient along with family or caregiver, if present, voiced understanding.

## 2025-06-16 ENCOUNTER — TELEPHONE (OUTPATIENT)
Age: 50
End: 2025-06-16

## (undated) DEVICE — GLOVE INDICATOR PI UNDERGLOVE SZ 7 BLUE

## (undated) DEVICE — BETHLEHEM UNIVERSAL MINOR VAG: Brand: CARDINAL HEALTH

## (undated) DEVICE — NEEDLE SPINAL18G X 3.5 IN QUINCKE

## (undated) DEVICE — WAND COBLATION  PROCISE XP TONSIL

## (undated) DEVICE — SPONGE 4 X 4 XRAY 16 PLY STRL LF RFD

## (undated) DEVICE — NEEDLE 18 G X 1 1/2

## (undated) DEVICE — NOVASURE KIT

## (undated) DEVICE — 3000CC GUARDIAN II: Brand: GUARDIAN

## (undated) DEVICE — TISSUE REMOVAL SYSTEM RESECTING DEVICE: Brand: SYMPHION

## (undated) DEVICE — NEPTUNE E-SEP SMOKE EVACUATION PENCIL, COATED, 70MM BLADE, PUSH BUTTON SWITCH: Brand: NEPTUNE E-SEP

## (undated) DEVICE — NEEDLE 25G X 1 1/2

## (undated) DEVICE — 2000CC GUARDIAN II: Brand: GUARDIAN

## (undated) DEVICE — DRAPE EQUIPMENT RF WAND

## (undated) DEVICE — INTENDED FOR TISSUE SEPARATION, AND OTHER PROCEDURES THAT REQUIRE A SHARP SURGICAL BLADE TO PUNCTURE OR CUT.: Brand: BARD-PARKER SAFETY BLADES SIZE 15, STERILE

## (undated) DEVICE — NEURO PATTIES 1/2 X 3

## (undated) DEVICE — TISSUE REMOVAL SYSTEM FLUID MANAGEMENT ACCESSORIES: Brand: SYMPHION

## (undated) DEVICE — PVC URETHRAL CATHETER: Brand: DOVER

## (undated) DEVICE — MEDI-VAC YANKAUER SUCTION HANDLE W/BULBOUS AND CONTROL VENT: Brand: CARDINAL HEALTH

## (undated) DEVICE — LIGHT GLOVE GREEN

## (undated) DEVICE — SCD SEQUENTIAL COMPRESSION COMFORT SLEEVE MEDIUM KNEE LENGTH: Brand: KENDALL SCD

## (undated) DEVICE — STERILE BETHLEHEM T AND A PACK: Brand: CARDINAL HEALTH

## (undated) DEVICE — PAD GROUNDING ADULT

## (undated) DEVICE — LIGHT HANDLE COVER SLEEVE DISP BLUE STELLAR

## (undated) DEVICE — SKIN MARKER DUAL TIP WITH RULER CAP, FLEXIBLE RULER AND LABELS: Brand: DEVON

## (undated) DEVICE — STERILE 8 INCH PROCTO SWAB: Brand: CARDINAL HEALTH

## (undated) DEVICE — GLOVE SRG BIOGEL 6

## (undated) DEVICE — AIRLIFE™ TRI-FLO™ SUCTION CATHETER WITH CONTROL PORT: Brand: AIRLIFE™

## (undated) DEVICE — MAYO STAND COVER: Brand: CONVERTORS

## (undated) DEVICE — 4-PORT MANIFOLD: Brand: NEPTUNE 2

## (undated) DEVICE — ANTI-FOG SOLUTION WITH FOAM PAD: Brand: DEVON

## (undated) DEVICE — SYRINGE 10ML LL

## (undated) DEVICE — POOLE SUCTION HANDLE: Brand: CARDINAL HEALTH

## (undated) DEVICE — REM POLYHESIVE ADULT PATIENT RETURN ELECTRODE: Brand: VALLEYLAB

## (undated) DEVICE — SUT CHROMIC 4-0 PS-4 18 IN 1643G

## (undated) DEVICE — CYSTO TUBING SINGLE IRRIGATION

## (undated) DEVICE — STRL ALLENTOWN HYSTEROSCOPY PK: Brand: CARDINAL HEALTH

## (undated) DEVICE — CHLORAPREP HI-LITE 26ML ORANGE

## (undated) DEVICE — INTENDED FOR TISSUE SEPARATION, AND OTHER PROCEDURES THAT REQUIRE A SHARP SURGICAL BLADE TO PUNCTURE OR CUT.: Brand: BARD-PARKER ® CARBON RIB-BACK BLADES

## (undated) DEVICE — STERILE NASAL PACK: Brand: CARDINAL HEALTH

## (undated) DEVICE — GLOVE INDICATOR PI UNDERGLOVE SZ 6.5 BLUE

## (undated) DEVICE — SUT ETHILON 2-0 FS 18 IN 664H

## (undated) DEVICE — GLOVE SRG BIOGEL ORTHOPEDIC 7

## (undated) DEVICE — SPONGE TONSIL STRUNG 7/8 IN X-RAY DETECT

## (undated) DEVICE — SPECIMEN CONTAINER STERILE PEEL PACK

## (undated) DEVICE — BETHLEHEM UNIVERSAL MINOR GEN: Brand: CARDINAL HEALTH

## (undated) DEVICE — SPONGE LAP 18 X 18 IN

## (undated) DEVICE — TUBING SUCTION 5MM X 12 FT

## (undated) DEVICE — CHLORHEXIDINE 4PCT 4 OZ

## (undated) DEVICE — WAND COBLATION REFLEX ULTRA 45